# Patient Record
Sex: FEMALE | Race: WHITE | NOT HISPANIC OR LATINO | Employment: OTHER | URBAN - NONMETROPOLITAN AREA
[De-identification: names, ages, dates, MRNs, and addresses within clinical notes are randomized per-mention and may not be internally consistent; named-entity substitution may affect disease eponyms.]

---

## 2018-03-07 NOTE — PROGRESS NOTES
History of Present Illness    Revaccination   Vaccine Information: Vaccine(s) Given (names): Marco Dress [id number]  Unable to reach by phone  Pt called (attempt 1): [phone] [phone] sd  Pt called (attempt 2): [id number] 1226 sd  Pt called (attempt 3): [id number] 1642 sd  Letter Sent (Regular and Certified): [id number] sd  Active Problems    1  Abdominal pain (789 00) (R10 9)   2  Allergy (995 3) (T78 40XA)   3  Dizziness (780 4) (R42)   4  Elevated alkaline phosphatase level (790 5) (R74 8)   5  Esophageal reflux (530 81) (K21 9)   6  Hearing Loss (389 9)   7  Hyperlipidemia (272 4) (E78 5)   8  Hypothyroidism (244 9) (E03 9)   9  Impaired fasting glucose (790 21) (R73 01)   10  Morbid or severe obesity due to excess calories (278 01) (E66 01)   11  Need for pneumococcal vaccine (V03 82) (Z23)   12  Obstructive sleep apnea (327 23) (G47 33)   13  Palpitations (785 1) (R00 2)   14  SVT (supraventricular tachycardia) (427 89) (I47 1)   15  Vitamin D deficiency (268 9) (E55 9)    Immunizations  Influenza --- [place]: pt refuses vaccine   PPSV --- [place]: per pt never   Tdap --- [place]: per pt  2010   Zoster --- Series1: per pt never     Current Meds   1  Aleve 220 MG Oral Tablet; Take 1 tablet 3 times daily as needed   2  Atorvastatin Calcium 20 MG Oral Tablet; Take 1 tablet daily as directed   3  Levothyroxine Sodium 50 MCG Oral Tablet; TAKE 1 TABLET EVERY DAY EXCEPT   MONDAY AND THURSDAY TAKE 2 TABLETS   4  Levothyroxine Sodium 75 MCG Oral Tablet; TAKE 1 TABLET DAILY   5  Omeprazole 20 MG Oral Capsule Delayed Release; TAKE 1 CAPSULE Daily   6  Vitamin D3 2000 UNIT Oral Tablet; 1 PO QD   7  Vitamin D3 75823 UNIT Oral Capsule; 1 PO Q WEEK   8  Zantac 300 MG Oral Tablet    Allergies    1   Penicillins    Signatures   Electronically signed by : HARPREET Bauman Dec 27 2016 12:18PM EST

## 2018-07-31 ENCOUNTER — OFFICE VISIT (OUTPATIENT)
Dept: INTERNAL MEDICINE CLINIC | Facility: CLINIC | Age: 73
End: 2018-07-31
Payer: MEDICARE

## 2018-07-31 ENCOUNTER — TRANSCRIBE ORDERS (OUTPATIENT)
Dept: LAB | Facility: CLINIC | Age: 73
End: 2018-07-31

## 2018-07-31 ENCOUNTER — LAB (OUTPATIENT)
Dept: LAB | Facility: CLINIC | Age: 73
End: 2018-07-31
Payer: MEDICARE

## 2018-07-31 VITALS
BODY MASS INDEX: 40.38 KG/M2 | HEART RATE: 60 BPM | WEIGHT: 205.7 LBS | DIASTOLIC BLOOD PRESSURE: 82 MMHG | HEIGHT: 60 IN | TEMPERATURE: 98.4 F | SYSTOLIC BLOOD PRESSURE: 124 MMHG | OXYGEN SATURATION: 92 %

## 2018-07-31 DIAGNOSIS — R73.01 IMPAIRED FASTING GLUCOSE: ICD-10-CM

## 2018-07-31 DIAGNOSIS — Z13.820 SCREENING FOR OSTEOPOROSIS: ICD-10-CM

## 2018-07-31 DIAGNOSIS — G89.29 CHRONIC PAIN OF LEFT KNEE: ICD-10-CM

## 2018-07-31 DIAGNOSIS — I10 ESSENTIAL HYPERTENSION: ICD-10-CM

## 2018-07-31 DIAGNOSIS — Z12.39 SCREENING FOR BREAST CANCER: ICD-10-CM

## 2018-07-31 DIAGNOSIS — E78.49 OTHER HYPERLIPIDEMIA: ICD-10-CM

## 2018-07-31 DIAGNOSIS — K21.9 GASTROESOPHAGEAL REFLUX DISEASE WITHOUT ESOPHAGITIS: ICD-10-CM

## 2018-07-31 DIAGNOSIS — J30.1 SEASONAL ALLERGIC RHINITIS DUE TO POLLEN: ICD-10-CM

## 2018-07-31 DIAGNOSIS — E03.9 HYPOTHYROIDISM, UNSPECIFIED TYPE: ICD-10-CM

## 2018-07-31 DIAGNOSIS — M25.562 CHRONIC PAIN OF LEFT KNEE: ICD-10-CM

## 2018-07-31 DIAGNOSIS — E55.9 VITAMIN D DEFICIENCY: ICD-10-CM

## 2018-07-31 DIAGNOSIS — E03.9 HYPOTHYROIDISM, UNSPECIFIED TYPE: Primary | ICD-10-CM

## 2018-07-31 DIAGNOSIS — Z12.4 SCREENING FOR CERVICAL CANCER: ICD-10-CM

## 2018-07-31 DIAGNOSIS — Z13.5 SCREENING FOR EYE CONDITION: ICD-10-CM

## 2018-07-31 DIAGNOSIS — I47.1 SVT (SUPRAVENTRICULAR TACHYCARDIA) (HCC): ICD-10-CM

## 2018-07-31 LAB
ALBUMIN SERPL BCP-MCNC: 3.5 G/DL (ref 3.5–5)
ALP SERPL-CCNC: 160 U/L (ref 46–116)
ALT SERPL W P-5'-P-CCNC: 32 U/L (ref 12–78)
ANION GAP SERPL CALCULATED.3IONS-SCNC: 5 MMOL/L (ref 4–13)
AST SERPL W P-5'-P-CCNC: 32 U/L (ref 5–45)
BASOPHILS # BLD AUTO: 0.06 THOUSANDS/ΜL (ref 0–0.1)
BASOPHILS NFR BLD AUTO: 1 % (ref 0–1)
BILIRUB SERPL-MCNC: 0.62 MG/DL (ref 0.2–1)
BUN SERPL-MCNC: 10 MG/DL (ref 5–25)
CALCIUM SERPL-MCNC: 8.7 MG/DL (ref 8.3–10.1)
CHLORIDE SERPL-SCNC: 109 MMOL/L (ref 100–108)
CHOLEST SERPL-MCNC: 157 MG/DL (ref 50–200)
CO2 SERPL-SCNC: 27 MMOL/L (ref 21–32)
CREAT SERPL-MCNC: 1.04 MG/DL (ref 0.6–1.3)
EOSINOPHIL # BLD AUTO: 0.14 THOUSAND/ΜL (ref 0–0.61)
EOSINOPHIL NFR BLD AUTO: 2 % (ref 0–6)
ERYTHROCYTE [DISTWIDTH] IN BLOOD BY AUTOMATED COUNT: 13.4 % (ref 11.6–15.1)
GFR SERPL CREATININE-BSD FRML MDRD: 53 ML/MIN/1.73SQ M
GLUCOSE P FAST SERPL-MCNC: 103 MG/DL (ref 65–99)
HCT VFR BLD AUTO: 40.1 % (ref 34.8–46.1)
HDLC SERPL-MCNC: 59 MG/DL (ref 40–60)
HGB BLD-MCNC: 12.7 G/DL (ref 11.5–15.4)
IMM GRANULOCYTES # BLD AUTO: 0.01 THOUSAND/UL (ref 0–0.2)
IMM GRANULOCYTES NFR BLD AUTO: 0 % (ref 0–2)
LDLC SERPL CALC-MCNC: 76 MG/DL (ref 0–100)
LYMPHOCYTES # BLD AUTO: 2.02 THOUSANDS/ΜL (ref 0.6–4.47)
LYMPHOCYTES NFR BLD AUTO: 30 % (ref 14–44)
MCH RBC QN AUTO: 28.7 PG (ref 26.8–34.3)
MCHC RBC AUTO-ENTMCNC: 31.7 G/DL (ref 31.4–37.4)
MCV RBC AUTO: 91 FL (ref 82–98)
MONOCYTES # BLD AUTO: 0.43 THOUSAND/ΜL (ref 0.17–1.22)
MONOCYTES NFR BLD AUTO: 6 % (ref 4–12)
NEUTROPHILS # BLD AUTO: 4.12 THOUSANDS/ΜL (ref 1.85–7.62)
NEUTS SEG NFR BLD AUTO: 61 % (ref 43–75)
NONHDLC SERPL-MCNC: 98 MG/DL
NRBC BLD AUTO-RTO: 0 /100 WBCS
PLATELET # BLD AUTO: 261 THOUSANDS/UL (ref 149–390)
PMV BLD AUTO: 12 FL (ref 8.9–12.7)
POTASSIUM SERPL-SCNC: 3.5 MMOL/L (ref 3.5–5.3)
PROT SERPL-MCNC: 7.1 G/DL (ref 6.4–8.2)
RBC # BLD AUTO: 4.42 MILLION/UL (ref 3.81–5.12)
SODIUM SERPL-SCNC: 141 MMOL/L (ref 136–145)
TRIGL SERPL-MCNC: 108 MG/DL
TSH SERPL DL<=0.05 MIU/L-ACNC: 1.18 UIU/ML (ref 0.36–3.74)
WBC # BLD AUTO: 6.78 THOUSAND/UL (ref 4.31–10.16)

## 2018-07-31 PROCEDURE — 85025 COMPLETE CBC W/AUTO DIFF WBC: CPT

## 2018-07-31 PROCEDURE — 36415 COLL VENOUS BLD VENIPUNCTURE: CPT

## 2018-07-31 PROCEDURE — 80053 COMPREHEN METABOLIC PANEL: CPT

## 2018-07-31 PROCEDURE — 80061 LIPID PANEL: CPT

## 2018-07-31 PROCEDURE — 82652 VIT D 1 25-DIHYDROXY: CPT

## 2018-07-31 PROCEDURE — 84443 ASSAY THYROID STIM HORMONE: CPT

## 2018-07-31 PROCEDURE — 99204 OFFICE O/P NEW MOD 45 MIN: CPT | Performed by: NURSE PRACTITIONER

## 2018-07-31 RX ORDER — ATORVASTATIN CALCIUM 20 MG/1
1 TABLET, FILM COATED ORAL DAILY
COMMUNITY
Start: 2014-07-22 | End: 2018-07-31 | Stop reason: SDUPTHER

## 2018-07-31 RX ORDER — LOSARTAN POTASSIUM 50 MG/1
25 TABLET ORAL DAILY
COMMUNITY
End: 2018-07-31 | Stop reason: SDUPTHER

## 2018-07-31 RX ORDER — FLUTICASONE PROPIONATE 50 MCG
1 SPRAY, SUSPENSION (ML) NASAL DAILY
COMMUNITY
End: 2021-01-15

## 2018-07-31 RX ORDER — OMEPRAZOLE 20 MG/1
20 CAPSULE, DELAYED RELEASE ORAL DAILY
Qty: 90 CAPSULE | Refills: 3 | Status: SHIPPED | OUTPATIENT
Start: 2018-07-31 | End: 2019-10-23 | Stop reason: SDUPTHER

## 2018-07-31 RX ORDER — LEVOTHYROXINE SODIUM 0.07 MG/1
75 TABLET ORAL DAILY
Qty: 90 TABLET | Refills: 3 | Status: SHIPPED | OUTPATIENT
Start: 2018-07-31 | End: 2019-11-12 | Stop reason: SDUPTHER

## 2018-07-31 RX ORDER — CETIRIZINE HYDROCHLORIDE 10 MG/1
10 TABLET ORAL DAILY
Qty: 30 TABLET | Refills: 3 | Status: SHIPPED | OUTPATIENT
Start: 2018-07-31 | End: 2021-01-15

## 2018-07-31 RX ORDER — ATORVASTATIN CALCIUM 20 MG/1
20 TABLET, FILM COATED ORAL DAILY
Qty: 90 TABLET | Refills: 3 | Status: SHIPPED | OUTPATIENT
Start: 2018-07-31 | End: 2019-10-31 | Stop reason: SDUPTHER

## 2018-07-31 RX ORDER — OMEPRAZOLE 20 MG/1
1 CAPSULE, DELAYED RELEASE ORAL DAILY
COMMUNITY
Start: 2017-04-07 | End: 2018-07-31 | Stop reason: SDUPTHER

## 2018-07-31 RX ORDER — LOSARTAN POTASSIUM 50 MG/1
25 TABLET ORAL DAILY
Qty: 90 TABLET | Refills: 3 | Status: SHIPPED | OUTPATIENT
Start: 2018-07-31 | End: 2021-01-11 | Stop reason: SDUPTHER

## 2018-07-31 RX ORDER — LEVOTHYROXINE SODIUM 0.07 MG/1
1 TABLET ORAL DAILY
COMMUNITY
Start: 2015-02-26 | End: 2018-07-31 | Stop reason: SDUPTHER

## 2018-07-31 RX ORDER — ACETAMINOPHEN 160 MG
TABLET,DISINTEGRATING ORAL DAILY
COMMUNITY
Start: 2014-07-22 | End: 2018-08-10 | Stop reason: SDUPTHER

## 2018-07-31 NOTE — PROGRESS NOTES
Assessment/Plan: Will order fasting labs for patient to have done and will give refills on her medications  She was given a script for a mammogram, bone density and GYN  She is deferring a colonoscopy at this time  She was given a referral for Dr Yola Barrientos for an eye exam   BP stable at 124/82  Will order an XR of her left knee due to chronic pain  Will follow up in 6 months or sooner if need be  No problem-specific Assessment & Plan notes found for this encounter           Problem List Items Addressed This Visit     Esophageal reflux    Relevant Medications    omeprazole (PriLOSEC) 20 mg delayed release capsule    Hypothyroidism - Primary    Relevant Medications    levothyroxine 75 mcg tablet    Other Relevant Orders    Comprehensive metabolic panel    CBC and differential    TSH, 3rd generation with T4 reflex    Lipid panel    Hyperlipidemia    Relevant Medications    atorvastatin (LIPITOR) 20 mg tablet    Other Relevant Orders    Comprehensive metabolic panel    CBC and differential    TSH, 3rd generation with T4 reflex    Lipid panel    Impaired fasting glucose    Relevant Orders    Comprehensive metabolic panel    CBC and differential    TSH, 3rd generation with T4 reflex    Lipid panel    SVT (supraventricular tachycardia) (HCC)    Vitamin D deficiency    Relevant Orders    Vitamin D 1,25 dihydroxy    Essential hypertension    Relevant Medications    losartan (COZAAR) 50 mg tablet      Other Visit Diagnoses     Screening for breast cancer        Relevant Orders    Mammo screening bilateral w 3d & cad    Screening for osteoporosis        Relevant Orders    DXA bone density spine hip and pelvis    Screening for cervical cancer        Relevant Orders    Ambulatory referral to Obstetrics / Gynecology    Chronic pain of left knee        Relevant Orders    XR knee 3 vw left non injury    Seasonal allergic rhinitis due to pollen        Relevant Medications    cetirizine (ZyrTEC) 10 mg tablet    Screening for eye condition        Relevant Orders    Ambulatory referral to Ophthalmology            Subjective:      Patient ID: Jessica Quintana is a 67 y o  female  Jessica Leo is here today to establish care as a new patient  She states her last Doctor was in [place] and she recently did move up here  She does have a history of hypothyroidism and is taking Synthroid 75 mcg, HTN, urinary incontinence, impaired fasting glucose, and SVT  She states she did have an ECHO done in 2015 which as normal   She is taking Losartan 50 mg daily  She did have her gallbladder removed  She will need a referral to an eye exam   She will need scripts for a mammogram, bone density, and GYN exam   She is deferring a colonoscopy  She is having issue with her left knee and states she did have a fall several months ago and her knee will swell on and off  She would like an XR for this  She denies any chest pain, SOB, and does have occasional palpitations  She denies any depression or anxiety  She denies any tobacco, alcohol, and drug use  She does not see a dentist and does have upper and lower dentures  She will need refills on her medications  She is having some post nasal drip and symptoms of allergies and would like something called in for her  She offers no other symptoms  The following portions of the patient's history were reviewed and updated as appropriate:   She  has a past medical history of Allergic; Hypertension; and Hypothyroid    She   Patient Active Problem List    Diagnosis Date Noted    Essential hypertension 07/31/2018    Obstructive sleep apnea 05/04/2015    SVT (supraventricular tachycardia) (HCC) 04/02/2015    Elevated alkaline phosphatase level 02/26/2015    Impaired fasting glucose 02/26/2015    Palpitations 02/26/2015    Vitamin D deficiency 05/05/2014    Hearing loss 03/24/2014    Hyperlipidemia 03/24/2014    Esophageal reflux 03/21/2014    Hypothyroidism 03/21/2014    Morbid obesity ([facility] [address] ) 03/21/2014     She  has a past surgical history that includes Dilation and curettage of uterus and Gallbladder surgery  Her family history includes Emphysema in her father and mother  She  reports that she has never smoked  She has never used smokeless tobacco  She reports that she does not drink alcohol or use drugs  Current Outpatient Prescriptions   Medication Sig Dispense Refill    atorvastatin (LIPITOR) 20 mg tablet Take 1 tablet (20 mg total) by mouth daily 90 tablet 3    Cholecalciferol (VITAMIN D3) 2000 units capsule Take by mouth daily      fluticasone (FLONASE) 50 mcg/act nasal spray 1 spray into each nostril daily      levothyroxine 75 mcg tablet Take 1 tablet (75 mcg total) by mouth daily 90 tablet 3    losartan (COZAAR) 50 mg tablet Take 0 5 tablets (25 mg total) by mouth daily 90 tablet 3    omeprazole (PriLOSEC) 20 mg delayed release capsule Take 1 capsule (20 mg total) by mouth daily 90 capsule 3    cetirizine (ZyrTEC) 10 mg tablet Take 1 tablet (10 mg total) by mouth daily 30 tablet 3     No current facility-administered medications for this visit  No current outpatient prescriptions on file prior to visit  No current facility-administered medications on file prior to visit  She is allergic to penicillins and shrimp extract allergy skin test     Review of Systems   Constitutional: Negative  HENT: Negative  Eyes: Negative  Respiratory: Negative  Cardiovascular: Negative  Gastrointestinal: Negative  Endocrine: Negative  Genitourinary: Negative  Musculoskeletal:        Left knee pain   Skin: Negative  Allergic/Immunologic: Negative  Neurological: Negative  Hematological: Negative  Psychiatric/Behavioral: Negative            Objective:      /82 (BP Location: Right arm, Patient Position: Sitting, Cuff Size: Large)   Pulse 60   Temp 98 4 °F (36 9 °C) (Temporal)   Ht 5' (1 524 m)   Wt 93 3 kg (205 lb 11 2 oz)   SpO2 92%   BMI 40 17 kg/m²          Physical Exam   Constitutional: She is oriented to person, place, and time  She appears well-developed and well-nourished  HENT:   Head: Normocephalic and atraumatic  Right Ear: External ear normal    Left Ear: External ear normal    Nose: Nose normal    Mouth/Throat: Oropharynx is clear and moist    Eyes: Conjunctivae and EOM are normal  Pupils are equal, round, and reactive to light  Neck: Normal range of motion  Neck supple  Cardiovascular: Normal rate, regular rhythm, normal heart sounds and intact distal pulses  Pulmonary/Chest: Effort normal and breath sounds normal    Abdominal: Soft  Bowel sounds are normal    Musculoskeletal: Normal range of motion  Neurological: She is alert and oriented to person, place, and time  She has normal reflexes  Skin: Skin is warm and dry  Psychiatric: She has a normal mood and affect  Her behavior is normal  Judgment and thought content normal    Vitals reviewed

## 2018-08-01 LAB — 1,25(OH)2D3 SERPL-MCNC: 48.6 PG/ML (ref 19.9–79.3)

## 2018-08-10 DIAGNOSIS — E55.9 VITAMIN D DEFICIENCY: Primary | ICD-10-CM

## 2018-08-10 NOTE — TELEPHONE ENCOUNTER
Pt called stating she is on Amlodipine 2 5 mg   Stated she brought it in and there is nothing in her chart  She got it refilled from her previous prescription  She was asking should she still take it because she is taking the losartan  Stated she would like an answer today left on her answering machine  Zena Abernathy

## 2018-08-11 RX ORDER — ACETAMINOPHEN 160 MG
2000 TABLET,DISINTEGRATING ORAL DAILY
Qty: 1 CAPSULE | Refills: 3 | Status: SHIPPED | OUTPATIENT
Start: 2018-08-11 | End: 2019-10-03

## 2018-08-11 NOTE — TELEPHONE ENCOUNTER
Left patient a message on her machine as she instructed  Per our verbal conversation and the message left in her chart, I instructed her that she can continue both medications  I also told her to call us back to let us know if she is taking her BP medications at home and what her readings have been  I told her we will be back in the office on Monday

## 2018-08-11 NOTE — TELEPHONE ENCOUNTER
I was already out of this office and did get this message this am   She can continue both medications does she check her BP s at home at all??

## 2018-08-16 ENCOUNTER — HOSPITAL ENCOUNTER (OUTPATIENT)
Dept: RADIOLOGY | Facility: HOSPITAL | Age: 73
Discharge: HOME | End: 2018-08-16
Payer: MEDICARE

## 2018-08-16 DIAGNOSIS — M25.562 CHRONIC PAIN OF LEFT KNEE: ICD-10-CM

## 2018-08-16 DIAGNOSIS — G89.29 CHRONIC PAIN OF LEFT KNEE: ICD-10-CM

## 2018-08-16 PROCEDURE — 73562 X-RAY EXAM OF KNEE 3: CPT

## 2018-08-20 NOTE — PROGRESS NOTES
Can you let emmett know her XR is showing degenerative changes in her left knee I would like to send her to Ortho

## 2018-11-27 ENCOUNTER — TELEPHONE (OUTPATIENT)
Dept: INTERNAL MEDICINE CLINIC | Facility: CLINIC | Age: 73
End: 2018-11-27

## 2018-11-27 NOTE — TELEPHONE ENCOUNTER
I called and tried to leave a message for [facility]  We would like to remind her that she is overdue for her mammogram and that there is an order in the system       Her voicemail was full

## 2019-10-01 ENCOUNTER — OFFICE VISIT (OUTPATIENT)
Dept: INTERNAL MEDICINE CLINIC | Facility: CLINIC | Age: 74
End: 2019-10-01
Payer: MEDICARE

## 2019-10-01 VITALS
RESPIRATION RATE: 18 BRPM | DIASTOLIC BLOOD PRESSURE: 78 MMHG | TEMPERATURE: 97.8 F | HEART RATE: 79 BPM | BODY MASS INDEX: 38.1 KG/M2 | OXYGEN SATURATION: 97 % | WEIGHT: 223.2 LBS | SYSTOLIC BLOOD PRESSURE: 116 MMHG | HEIGHT: 64 IN

## 2019-10-01 DIAGNOSIS — E03.9 HYPOTHYROIDISM, UNSPECIFIED TYPE: ICD-10-CM

## 2019-10-01 DIAGNOSIS — Z00.00 MEDICARE ANNUAL WELLNESS VISIT, SUBSEQUENT: Primary | ICD-10-CM

## 2019-10-01 DIAGNOSIS — K21.9 GASTROESOPHAGEAL REFLUX DISEASE WITHOUT ESOPHAGITIS: ICD-10-CM

## 2019-10-01 DIAGNOSIS — I10 ESSENTIAL HYPERTENSION: ICD-10-CM

## 2019-10-01 DIAGNOSIS — E78.2 MIXED HYPERLIPIDEMIA: ICD-10-CM

## 2019-10-01 DIAGNOSIS — M25.50 ARTHRALGIA, UNSPECIFIED JOINT: ICD-10-CM

## 2019-10-01 DIAGNOSIS — Z78.0 POSTMENOPAUSAL: ICD-10-CM

## 2019-10-01 DIAGNOSIS — E55.9 VITAMIN D DEFICIENCY: ICD-10-CM

## 2019-10-01 DIAGNOSIS — M17.12 ARTHRITIS OF LEFT KNEE: ICD-10-CM

## 2019-10-01 DIAGNOSIS — Z12.39 SCREENING FOR BREAST CANCER: ICD-10-CM

## 2019-10-01 DIAGNOSIS — Z12.11 SCREENING FOR COLON CANCER: ICD-10-CM

## 2019-10-01 DIAGNOSIS — Z23 NEED FOR INFLUENZA VACCINATION: ICD-10-CM

## 2019-10-01 DIAGNOSIS — M25.512 ACUTE PAIN OF LEFT SHOULDER: ICD-10-CM

## 2019-10-01 DIAGNOSIS — E66.01 MORBID OBESITY (HCC): ICD-10-CM

## 2019-10-01 DIAGNOSIS — I47.1 SVT (SUPRAVENTRICULAR TACHYCARDIA) (HCC): ICD-10-CM

## 2019-10-01 PROCEDURE — G0008 ADMIN INFLUENZA VIRUS VAC: HCPCS | Performed by: NURSE PRACTITIONER

## 2019-10-01 PROCEDURE — 99214 OFFICE O/P EST MOD 30 MIN: CPT | Performed by: NURSE PRACTITIONER

## 2019-10-01 PROCEDURE — 90662 IIV NO PRSV INCREASED AG IM: CPT | Performed by: NURSE PRACTITIONER

## 2019-10-01 PROCEDURE — G0439 PPPS, SUBSEQ VISIT: HCPCS | Performed by: NURSE PRACTITIONER

## 2019-10-01 RX ORDER — AMLODIPINE BESYLATE 2.5 MG/1
2.5 TABLET ORAL DAILY
COMMUNITY
End: 2020-01-31 | Stop reason: SDUPTHER

## 2019-10-01 RX ORDER — MONTELUKAST SODIUM 10 MG/1
10 TABLET ORAL DAILY
Refills: 11 | COMMUNITY
Start: 2019-07-14 | End: 2021-01-15

## 2019-10-01 NOTE — PATIENT INSTRUCTIONS
Medicare Preventive Visit Patient Instructions  Thank you for completing your Welcome to Medicare Visit or Medicare Annual Wellness Visit today  Your next wellness visit will be due in one year (10/1/2020)  The screening/preventive services that you may require over the next 5-10 years are detailed below  Some tests may not apply to you based off risk factors and/or age  Screening tests ordered at today's visit but not completed yet may show as past due  Also, please note that scanned in results may not display below  Preventive Screenings:  Service Recommendations Previous Testing/Comments   Colorectal Cancer Screening  * Colonoscopy    * Fecal Occult Blood Test (FOBT)/Fecal Immunochemical Test (FIT)  * Fecal DNA/Cologuard Test  * Flexible Sigmoidoscopy Age: 54-65 years old   Colonoscopy: every 10 years (may be performed more frequently if at higher risk)  OR  FOBT/FIT: every 1 year  OR  Cologuard: every 3 years  OR  Sigmoidoscopy: every 5 years  Screening may be recommended earlier than age 48 if at higher risk for colorectal cancer  Also, an individualized decision between you and your healthcare provider will decide whether screening between the ages of 74-80 would be appropriate  Colonoscopy: Not on file  FOBT/FIT: Not on file  Cologuard: Not on file  Sigmoidoscopy: Not on file         Breast Cancer Screening Age: 36 years old  Frequency: every 1-2 years  Not required if history of left and right mastectomy Mammogram: Not on file       Cervical Cancer Screening Between the ages of 21-29, pap smear recommended once every 3 years  Between the ages of 33-67, can perform pap smear with HPV co-testing every 5 years     Recommendations may differ for women with a history of total hysterectomy, cervical cancer, or abnormal pap smears in past  Pap Smear: Not on file    Screening Not Indicated   Hepatitis C Screening Once for adults born between [facility]  More frequently in patients at high risk for Hepatitis C Hep C Antibody: Not on file       Diabetes Screening 1-2 times per year if you're at risk for diabetes or have pre-diabetes Fasting glucose: 103 mg/dL   A1C: 5 9 %       Cholesterol Screening Once every 5 years if you don't have a lipid disorder  May order more often based on risk factors  Lipid panel: 07/31/2018    Screening Not Indicated  History Lipid Disorder     Other Preventive Screenings Covered by Medicare:  1  Abdominal Aortic Aneurysm (AAA) Screening: covered once if your at risk  You're considered to be at risk if you have a family history of AAA  2  Lung Cancer Screening: covers low dose CT scan once per year if you meet all of the following conditions: (1) Age 50-69; (2) No signs or symptoms of lung cancer; (3) Current smoker or have quit smoking within the last 15 years; (4) You have a tobacco smoking history of at least 30 pack years (packs per day multiplied by number of years you smoked); (5) You get a written order from a healthcare provider  3  Glaucoma Screening: covered annually if you're considered high risk: (1) You have diabetes OR (2) Family history of glaucoma OR (3)  aged 48 and older OR (3)  American aged 72 and older  3  Osteoporosis Screening: covered every 2 years if you meet one of the following conditions: (1) You're estrogen deficient and at risk for osteoporosis based off medical history and other findings; (2) Have a vertebral abnormality; (3) On glucocorticoid therapy for more than 3 months; (4) Have primary hyperparathyroidism; (5) On osteoporosis medications and need to assess response to drug therapy  · Last bone density test (DXA Scan): Not on file  5  HIV Screening: covered annually if you're between the age of 12-76  Also covered annually if you are younger than 13 and older than 72 with risk factors for HIV infection  For pregnant patients, it is covered up to 3 times per pregnancy      Immunizations:  Immunization Recommendations   Influenza Vaccine Annual influenza vaccination during flu season is recommended for all persons aged >= 6 months who do not have contraindications   Pneumococcal Vaccine (Prevnar and Pneumovax)  * Prevnar = PCV13  * Pneumovax = PPSV23   Adults 25-60 years old: 1-3 doses may be recommended based on certain risk factors  Adults 72 years old: Prevnar (PCV13) vaccine recommended followed by Pneumovax (PPSV23) vaccine  If already received PPSV23 since turning 65, then PCV13 recommended at least one year after PPSV23 dose  Hepatitis B Vaccine 3 dose series if at intermediate or high risk (ex: diabetes, end stage renal disease, liver disease)   Tetanus (Td) Vaccine - COST NOT COVERED BY MEDICARE PART B Following completion of primary series, a booster dose should be given every 10 years to maintain immunity against tetanus  Td may also be given as tetanus wound prophylaxis  Tdap Vaccine - COST NOT COVERED BY MEDICARE PART B Recommended at least once for all adults  For pregnant patients, recommended with each pregnancy  Shingles Vaccine (Shingrix) - COST NOT COVERED BY MEDICARE PART B  2 shot series recommended in those aged 48 and above     Health Maintenance Due:      Topic Date Due    MAMMOGRAM  1945    CRC Screening: Colonoscopy  1945     Immunizations Due:      Topic Date Due    Pneumococcal Vaccine: 65+ Years (2 of 2 - PPSV23) 10/27/2016    INFLUENZA VACCINE  07/01/2019     Advance Directives   What are advance directives? Advance directives are legal documents that state your wishes and plans for medical care  These plans are made ahead of time in case you lose your ability to make decisions for yourself  Advance directives can apply to any medical decision, such as the treatments you want, and if you want to donate organs  What are the types of advance directives? There are many types of advance directives, and each state has rules about how to use them   You may choose a combination of any of the following:  · Living will: This is a written record of the treatment you want  You can also choose which treatments you do not want, which to limit, and which to stop at a certain time  This includes surgery, medicine, IV fluid, and tube feedings  · Durable power of  for healthcare [facility]): This is a written record that states who you want to make healthcare choices for you when you are unable to make them for yourself  This person, called a proxy, is usually a family member or a friend  You may choose more than 1 proxy  · Do not resuscitate (DNR) order:  A DNR order is used in case your heart stops beating or you stop breathing  It is a request not to have certain forms of treatment, such as CPR  A DNR order may be included in other types of advance directives  · Medical directive: This covers the care that you want if you are in a coma, near death, or unable to make decisions for yourself  You can list the treatments you want for each condition  Treatment may include pain medicine, surgery, blood transfusions, dialysis, IV or tube feedings, and a ventilator (breathing machine)  · Values history: This document has questions about your views, beliefs, and how you feel and think about life  This information can help others choose the care that you would choose  Why are advance directives important? An advance directive helps you control your care  Although spoken wishes may be used, it is better to have your wishes written down  Spoken wishes can be misunderstood, or not followed  Treatments may be given even if you do not want them  An advance directive may make it easier for your family to make difficult choices about your care  Fall Prevention    Fall prevention  includes ways to make your home and other areas safer  It also includes ways you can move more carefully to prevent a fall   Health conditions that cause changes in your blood pressure, vision, or muscle strength and coordination may increase your risk for falls  Medicines may also increase your risk for falls if they make you dizzy, weak, or sleepy  Fall prevention tips:   · Stand or sit up slowly  · Use assistive devices as directed  · Wear shoes that fit well and have soles that   · Wear a personal alarm  · Stay active  · Manage your medical conditions  Home Safety Tips:  · Add items to prevent falls in the bathroom  · Keep paths clear  · Install bright lights in your home  · Keep items you use often on shelves within reach  · Paint or place reflective tape on the edges of your stairs  Urinary Incontinence   Urinary incontinence (UI)  is when you lose control of your bladder  UI develops because your bladder cannot store or empty urine properly  The 3 most common types of UI are stress incontinence, urge incontinence, or both  Medicines:   · May be given to help strengthen your bladder control  Report any side effects of medication to your healthcare provider  Do pelvic muscle exercises often:  Your pelvic muscles help you stop urinating  Squeeze these muscles tight for 5 seconds, then relax for 5 seconds  Gradually work up to squeezing for 10 seconds  Do 3 sets of 15 repetitions a day, or as directed  This will help strengthen your pelvic muscles and improve bladder control  Train your bladder:  Go to the bathroom at set times, such as every 2 hours, even if you do not feel the urge to go  You can also try to hold your urine when you feel the urge to go  For example, hold your urine for 5 minutes when you feel the urge to go  As that becomes easier, hold your urine for 10 minutes  Self-care:   · Keep a UI record  Write down how often you leak urine and how much you leak  Make a note of what you were doing when you leaked urine  · Drink liquids as directed  You may need to limit the amount of liquid you drink to help control your urine leakage  Do not drink any liquid right before you go to bed  Limit or do not have drinks that contain caffeine or alcohol  · Prevent constipation  Eat a variety of high-fiber foods  Good examples are high-fiber cereals, beans, vegetables, and whole-grain breads  Walking is the best way to trigger your intestines to have a bowel movement  · Exercise regularly and maintain a healthy weight  Weight loss and exercise will decrease pressure on your bladder and help you control your leakage  · Use a catheter as directed  to help empty your bladder  A catheter is a tiny, plastic tube that is put into your bladder to drain your urine  · Go to behavior therapy as directed  Behavior therapy may be used to help you learn to control your urge to urinate  Weight Management   Why it is important to manage your weight:  Being overweight increases your risk of health conditions such as heart disease, high blood pressure, type 2 diabetes, and certain types of cancer  It can also increase your risk for osteoarthritis, sleep apnea, and other respiratory problems  Aim for a slow, steady weight loss  Even a small amount of weight loss can lower your risk of health problems  How to lose weight safely:  A safe and healthy way to lose weight is to eat fewer calories and get regular exercise  You can lose up about 1 pound a week by decreasing the number of calories you eat by 500 calories each day  Healthy meal plan for weight management:  A healthy meal plan includes a variety of foods, contains fewer calories, and helps you stay healthy  A healthy meal plan includes the following:  · Eat whole-grain foods more often  A healthy meal plan should contain fiber  Fiber is the part of grains, fruits, and vegetables that is not broken down by your body  Whole-grain foods are healthy and provide extra fiber in your diet  Some examples of whole-grain foods are whole-wheat breads and pastas, oatmeal, brown rice, and bulgur  · Eat a variety of vegetables every day    Include dark, leafy greens such as spinach, kale, sebastián greens, and mustard greens  Eat yellow and orange vegetables such as carrots, sweet potatoes, and winter squash  · Eat a variety of fruits every day  Choose fresh or canned fruit (canned in its own juice or light syrup) instead of juice  Fruit juice has very little or no fiber  · Eat low-fat dairy foods  Drink fat-free (skim) milk or 1% milk  Eat fat-free yogurt and low-fat cottage cheese  Try low-fat cheeses such as mozzarella and other reduced-fat cheeses  · Choose meat and other protein foods that are low in fat  Choose beans or other legumes such as split peas or lentils  Choose fish, skinless poultry (chicken or turkey), or lean cuts of red meat (beef or pork)  Before you cook meat or poultry, cut off any visible fat  · Use less fat and oil  Try baking foods instead of frying them  Add less fat, such as margarine, sour cream, regular salad dressing and mayonnaise to foods  Eat fewer high-fat foods  Some examples of high-fat foods include french fries, doughnuts, ice cream, and cakes  · Eat fewer sweets  Limit foods and drinks that are high in sugar  This includes candy, cookies, regular soda, and sweetened drinks  Exercise:  Exercise at least 30 minutes per day on most days of the week  Some examples of exercise include walking, biking, dancing, and swimming  You can also fit in more physical activity by taking the stairs instead of the elevator or parking farther away from stores  Ask your healthcare provider about the best exercise plan for you  © Copyright [employer] 2018 Information is for End User's use only and may not be sold, redistributed or otherwise used for commercial purposes  All illustrations and images included in CareNotes® are the copyrighted property of A [employer] A M , Inc  or [facility] Winifred Adhikari    Low Fat Diet   AMBULATORY CARE:   A low-fat diet  is an eating plan that is low in total fat, unhealthy fat, and cholesterol   You may need to follow a low-fat diet if you have trouble digesting or absorbing fat  You may also need to follow this diet if you have high cholesterol  You can also lower your cholesterol by increasing the amount of fiber in your diet  Soluble fiber is a type of fiber that helps to decrease cholesterol levels  Different types of fat in food:   · Limit unhealthy fats  A diet that is high in cholesterol, saturated fat, and trans fat may cause unhealthy cholesterol levels  Unhealthy cholesterol levels increase your risk of heart disease  ¨ Cholesterol:  Limit intake of cholesterol to less than 200 mg per day  Cholesterol is found in meat, eggs, and dairy  ¨ Saturated fat:  Limit saturated fat to less than 7% of your total daily calories  Ask your dietitian how many calories you need each day  Saturated fat is found in butter, cheese, ice cream, whole milk, and palm oil  Saturated fat is also found in meat, such as beef, pork, chicken skin, and processed meats  Processed meats include sausage, hot dogs, and bologna  ¨ Trans fat:  Avoid trans fat as much as possible  Trans fat is used in fried and baked foods  Foods that say trans fat free on the label may still have up to 0 5 grams of trans fat per serving  · Include healthy fats  Replace foods that are high in saturated and trans fat with foods high in healthy fats  This may help to decrease high cholesterol levels  ¨ Monounsaturated fats: These are found in avocados, nuts, and vegetable oils, such as olive, canola, and sunflower oil  ¨ Polyunsaturated fats: These can be found in vegetable oils, such as soybean or corn oil  Omega-3 fats can help to decrease the risk of heart disease  Omega-3 fats are found in fish, such as salmon, herring, trout, and tuna  Omega-3 fats can also be found in plant foods, such as walnuts, flaxseed, soybeans, and canola oil    Foods to limit or avoid:   · Grains:      ¨ Snacks that are made with partially hydrogenated oils, such as chips, regular crackers, and butter-flavored popcorn    ¨ High-fat baked goods, such as biscuits, croissants, doughnuts, pies, cookies, and pastries    · Dairy:      ¨ Whole milk, 2% milk, and yogurt and ice cream made with whole milk    ¨ Half and half creamer, heavy cream, and whipping cream    ¨ Cheese, cream cheese, and sour cream    · Meats and proteins:      ¨ High-fat cuts of meat (T-bone steak, regular hamburger, and ribs)    ¨ Fried meat, poultry (turkey and chicken), and fish    ¨ Poultry (chicken and turkey) with skin    ¨ Cold cuts (salami or bologna), hot dogs, cedillo, and sausage    ¨ Whole eggs and egg yolks    · Vegetables and fruits with added fat:      ¨ Fried vegetables or vegetables in butter or high-fat sauces, such as cream or cheese sauces    ¨ Fried fruit or fruit served with butter or cream    · Fats:      ¨ Butter, stick margarine, and shortening    ¨ Coconut, palm oil, and palm kernel oil  Foods to include:   · Grains:      ¨ Whole-grain breads, cereals, pasta, and brown rice    ¨ Low-fat crackers and pretzels    · Vegetables and fruits:      ¨ Fresh, frozen, or canned vegetables (no salt or low-sodium)    ¨ Fresh, frozen, dried, or canned fruit (canned in light syrup or fruit juice)    ¨ Avocado    · Low-fat dairy products:      ¨ Nonfat (skim) or 1% milk    ¨ Nonfat or low-fat cheese, yogurt, and cottage cheese    · Meats and proteins:      ¨ Chicken or turkey with no skin    ¨ Baked or broiled fish    ¨ Lean beef and pork (loin, round, extra lean hamburger)    ¨ Beans and peas, unsalted nuts, soy products    ¨ Egg whites and substitutes    ¨ Seeds and nuts    · Fats:      ¨ Unsaturated oil, such as canola, olive, peanut, soybean, or sunflower oil    ¨ Soft or liquid margarine and vegetable oil spread    ¨ Low-fat salad dressing  Other ways to decrease fat:   · Read food labels before you buy foods  Choose foods that have less than 30% of calories from fat   Choose low-fat or fat-free dairy products  Remember that fat free does not mean calorie free  These foods still contain calories, and too many calories can lead to weight gain  · Trim fat from meat and avoid fried food  Trim all visible fat from meat before you cook it  Remove the skin from poultry  Do not muller meat, fish, or poultry  Bake, roast, boil, or broil these foods instead  Avoid fried foods  Eat a baked potato instead of Western Melisa fries  Steam vegetables instead of sautéing them in butter  · Add less fat to foods  Use imitation cedillo bits on salads and baked potatoes instead of regular cedillo bits  Use fat-free or low-fat salad dressings instead of regular dressings  Use low-fat or nonfat butter-flavored topping instead of regular butter or margarine on popcorn and other foods  Ways to decrease fat in recipes:  Replace high-fat ingredients with low-fat or nonfat ones  This may cause baked goods to be drier than usual  You may need to use nonfat cooking spray on pans to prevent food from sticking  You also may need to change the amount of other ingredients, such as water, in the recipe  Try the following:  · Use low-fat or light margarine instead of regular margarine or shortening  · Use lean ground turkey breast or chicken, or lean ground beef (less than 5% fat) instead of hamburger  · Add 1 teaspoon of canola oil to 8 ounces of skim milk instead of using cream or half and half  · Use grated zucchini, carrots, or apples in breads instead of coconut  · Use blenderized, low-fat cottage cheese, plain tofu, or low-fat ricotta cheese instead of cream cheese  · Use 1 egg white and 1 teaspoon of canola oil, or use ¼ cup (2 ounces) of fat-free egg substitute instead of a whole egg  · Replace half of the oil that is called for in a recipe with applesauce when you bake  Use 3 tablespoons of cocoa powder and 1 tablespoon of canola oil instead of a square of baking chocolate    How to increase fiber:  Eat enough high-fiber foods to get 20 to 30 grams of fiber every day  Slowly increase your fiber intake to avoid stomach cramps, gas, and other problems  · Eat 3 ounces of whole-grain foods each day  An ounce is about 1 slice of bread  Eat whole-grain breads, such as whole-wheat bread  Whole wheat, whole-wheat flour, or other whole grains should be listed as the first ingredient on the food label  Replace white flour with whole-grain flour or use half of each in recipes  Whole-grain flour is heavier than white flour, so you may have to add more yeast or baking powder  · Eat a high-fiber cereal for breakfast   Oatmeal is a good source of soluble fiber  Look for cereals that have bran or fiber in the name  Choose whole-grain products, such as brown rice, barley, and whole-wheat pasta  · Eat more beans, peas, and lentils  For example, add beans to soups or salads  Eat at least 5 cups of fruits and vegetables each day  Eat fruits and vegetables with the peel because the peel is high in fiber  © 2017 [facility] Information is for End User's use only and may not be sold, redistributed or otherwise used for commercial purposes  All illustrations and images included in CareNotes® are the copyrighted property of [employer]  or Fernando Rowley  The above information is an  only  It is not intended as medical advice for individual conditions or treatments  Talk to your doctor, nurse or pharmacist before following any medical regimen to see if it is safe and effective for you  Heart Healthy Diet   AMBULATORY CARE:   A heart healthy diet  is an eating plan low in total fat, unhealthy fats, and sodium (salt)  A heart healthy diet helps decrease your risk for heart disease and stroke  Limit the amount of fat you eat to 25% to 35% of your total daily calories  Limit sodium to less than 2,300 mg each day  Healthy fats:  Healthy fats can help improve cholesterol levels   The risk for heart disease is decreased when cholesterol levels are normal  Choose healthy fats, such as the following:  · Unsaturated fat  is found in foods such as soybean, canola, olive, corn, and safflower oils  It is also found in soft tub margarine that is made with liquid vegetable oil  · Omega-3 fat  is found in certain fish, such as salmon, tuna, and trout, and in walnuts and flaxseed  Unhealthy fats:  Unhealthy fats can cause unhealthy cholesterol levels in your blood and increase your risk of heart disease  Limit unhealthy fats, such as the following:  · Cholesterol  is found in animal foods, such as eggs and lobster, and in dairy products made from whole milk  Limit cholesterol to less than 300 milligrams (mg) each day  You may need to limit cholesterol to 200 mg each day if you have heart disease  · Saturated fat  is found in meats, such as cedillo and hamburger  It is also found in chicken or turkey skin, whole milk, and butter  Limit saturated fat to less than 7% of your total daily calories  Limit saturated fat to less than 6% if you have heart disease or are at increased risk for it  · Trans fat  is found in packaged foods, such as potato chips and cookies  It is also in hard margarine, some fried foods, and shortening  Avoid trans fats as much as possible    Heart healthy foods and drinks to include:  Ask your dietitian or healthcare provider how many servings to have from each of the following food groups:  · Grains:      ¨ Whole-wheat breads, cereals, and pastas, and brown rice    ¨ Low-fat, low-sodium crackers and chips    · Vegetables:      ¨ Broccoli, green beans, green peas, and spinach    ¨ Collards, kale, and lima beans    ¨ Carrots, sweet potatoes, tomatoes, and peppers    ¨ Canned vegetables with no salt added    · Fruits:      ¨ Bananas, peaches, pears, and pineapple    ¨ Grapes, raisins, and dates    ¨ Oranges, tangerines, grapefruit, orange juice, and grapefruit juice    ¨ Apricots, mangoes, melons, and papaya    ¨ Raspberries and strawberries    ¨ Canned fruit with no added sugar    · Low-fat dairy products:      ¨ Nonfat (skim) milk, 1% milk, and low-fat almond, cashew, or soy milks fortified with calcium    ¨ Low-fat cheese, regular or frozen yogurt, and cottage cheese    · Meats and proteins , such as lean cuts of beef and pork (loin, leg, round), skinless chicken and turkey, legumes, soy products, egg whites, and nuts  Foods and drinks to limit or avoid:  Ask your dietitian or healthcare provider about these and other foods that are high in unhealthy fat, sodium, and sugar:  · Snack or packaged foods , such as frozen dinners, cookies, macaroni and cheese, and cereals with more than 300 mg of sodium per serving    · Canned or dry mixes  for cakes, soups, sauces, or gravies    · Vegetables with added sodium , such as instant potatoes, vegetables with added sauces, or regular canned vegetables    · Other foods high in sodium , such as ketchup, barbecue sauce, salad dressing, pickles, olives, soy sauce, and miso    · High-fat dairy foods  such as whole or 2% milk, cream cheese, or sour cream, and cheeses     · High-fat protein foods  such as high-fat cuts of beef (T-bone steaks, ribs), chicken or turkey with skin, and organ meats, such as liver    · Cured or smoked meats , such as hot dogs, cedillo, and sausage    · Unhealthy fats and oils , such as butter, stick margarine, shortening, and cooking oils such as coconut or palm oil    · Food and drinks high in sugar , such as soft drinks (soda), sports drinks, sweetened tea, candy, cake, cookies, pies, and doughnuts  Other diet guidelines to follow:   · Eat more foods containing omega-3 fats  Eat fish high in omega-3 fats at least 2 times a week  · Limit alcohol  Too much alcohol can damage your heart and raise your blood pressure  Women should limit alcohol to 1 drink a day  Men should limit alcohol to 2 drinks a day   A drink of alcohol is 12 ounces of beer, 5 ounces of wine, or 1½ ounces of liquor  · Choose low-sodium foods  High-sodium foods can lead to high blood pressure  Add little or no salt to food you prepare  Use herbs and spices in place of salt  · Eat more fiber  to help lower cholesterol levels  Eat at least 5 servings of fruits and vegetables each day  Eat 3 ounces of whole-grain foods each day  Legumes (beans) are also a good source of fiber  Lifestyle guidelines:   · Do not smoke  Nicotine and other chemicals in cigarettes and cigars can cause lung and heart damage  Ask your healthcare provider for information if you currently smoke and need help to quit  E-cigarettes or smokeless tobacco still contain nicotine  Talk to your healthcare provider before you use these products  · Exercise regularly  to help you maintain a healthy weight and improve your blood pressure and cholesterol levels  Ask your healthcare provider about the best exercise plan for you  Do not start an exercise program without asking your healthcare provider  Follow up with your healthcare provider as directed:  Write down your questions so you remember to ask them during your visits  © 2017 [facility] Information is for End User's use only and may not be sold, redistributed or otherwise used for commercial purposes  All illustrations and images included in CareNotes® are the copyrighted property of [employer] D [employer] M [employer]  or Fernando Rowley  The above information is an  only  It is not intended as medical advice for individual conditions or treatments  Talk to your doctor, nurse or pharmacist before following any medical regimen to see if it is safe and effective for you  Medicare Preventive Visit Patient Instructions  Thank you for completing your Welcome to Medicare Visit or Medicare Annual Wellness Visit today  Your next wellness visit will be due in one year (10/1/2020)    The screening/preventive services that you may require over the next 5-10 years are detailed below  Some tests may not apply to you based off risk factors and/or age  Screening tests ordered at today's visit but not completed yet may show as past due  Also, please note that scanned in results may not display below  Preventive Screenings:  Service Recommendations Previous Testing/Comments   Colorectal Cancer Screening  * Colonoscopy    * Fecal Occult Blood Test (FOBT)/Fecal Immunochemical Test (FIT)  * Fecal DNA/Cologuard Test  * Flexible Sigmoidoscopy Age: 54-65 years old   Colonoscopy: every 10 years (may be performed more frequently if at higher risk)  OR  FOBT/FIT: every 1 year  OR  Cologuard: every 3 years  OR  Sigmoidoscopy: every 5 years  Screening may be recommended earlier than age 48 if at higher risk for colorectal cancer  Also, an individualized decision between you and your healthcare provider will decide whether screening between the ages of 74-80 would be appropriate  Colonoscopy: Not on file  FOBT/FIT: Not on file  Cologuard: Not on file  Sigmoidoscopy: Not on file         Breast Cancer Screening Age: 36 years old  Frequency: every 1-2 years  Not required if history of left and right mastectomy Mammogram: Not on file       Cervical Cancer Screening Between the ages of 21-29, pap smear recommended once every 3 years  Between the ages of 33-67, can perform pap smear with HPV co-testing every 5 years  Recommendations may differ for women with a history of total hysterectomy, cervical cancer, or abnormal pap smears in past  Pap Smear: Not on file    Screening Not Indicated   Hepatitis C Screening Once for adults born between 1945 and 1965  More frequently in patients at high risk for Hepatitis C Hep C Antibody: Not on file       Diabetes Screening 1-2 times per year if you're at risk for diabetes or have pre-diabetes Fasting glucose: 103 mg/dL   A1C: 5 9 %       Cholesterol Screening Once every 5 years if you don't have a lipid disorder  May order more often based on risk factors  Lipid panel: 07/31/2018    Screening Not Indicated  History Lipid Disorder     Other Preventive Screenings Covered by Medicare:  6  Abdominal Aortic Aneurysm (AAA) Screening: covered once if your at risk  You're considered to be at risk if you have a family history of AAA  7  Lung Cancer Screening: covers low dose CT scan once per year if you meet all of the following conditions: (1) Age 50-69; (2) No signs or symptoms of lung cancer; (3) Current smoker or have quit smoking within the last 15 years; (4) You have a tobacco smoking history of at least 30 pack years (packs per day multiplied by number of years you smoked); (5) You get a written order from a healthcare provider  8  Glaucoma Screening: covered annually if you're considered high risk: (1) You have diabetes OR (2) Family history of glaucoma OR (3)  aged 48 and older OR (3)  American aged 72 and older  5  Osteoporosis Screening: covered every 2 years if you meet one of the following conditions: (1) You're estrogen deficient and at risk for osteoporosis based off medical history and other findings; (2) Have a vertebral abnormality; (3) On glucocorticoid therapy for more than 3 months; (4) Have primary hyperparathyroidism; (5) On osteoporosis medications and need to assess response to drug therapy  · Last bone density test (DXA Scan): Not on file  10  HIV Screening: covered annually if you're between the age of 12-76  Also covered annually if you are younger than 13 and older than 72 with risk factors for HIV infection  For pregnant patients, it is covered up to 3 times per pregnancy      Immunizations:  Immunization Recommendations   Influenza Vaccine Annual influenza vaccination during flu season is recommended for all persons aged >= 6 months who do not have contraindications   Pneumococcal Vaccine (Prevnar and Pneumovax)  * Prevnar = PCV13  * Pneumovax = PPSV23   Adults 19-64 years old: 1-3 doses may be recommended based on certain risk factors  Adults 72 years old: Prevnar (PCV13) vaccine recommended followed by Pneumovax (PPSV23) vaccine  If already received PPSV23 since turning 65, then PCV13 recommended at least one year after PPSV23 dose  Hepatitis B Vaccine 3 dose series if at intermediate or high risk (ex: diabetes, end stage renal disease, liver disease)   Tetanus (Td) Vaccine - COST NOT COVERED BY MEDICARE PART B Following completion of primary series, a booster dose should be given every 10 years to maintain immunity against tetanus  Td may also be given as tetanus wound prophylaxis  Tdap Vaccine - COST NOT COVERED BY MEDICARE PART B Recommended at least once for all adults  For pregnant patients, recommended with each pregnancy  Shingles Vaccine (Shingrix) - COST NOT COVERED BY MEDICARE PART B  2 shot series recommended in those aged 48 and above     Health Maintenance Due:      Topic Date Due    MAMMOGRAM  1945    CRC Screening: Colonoscopy  1945     Immunizations Due:      Topic Date Due    Pneumococcal Vaccine: 65+ Years (2 of 2 - PPSV23) 10/03/2018    INFLUENZA VACCINE  07/01/2019     Advance Directives   What are advance directives? Advance directives are legal documents that state your wishes and plans for medical care  These plans are made ahead of time in case you lose your ability to make decisions for yourself  Advance directives can apply to any medical decision, such as the treatments you want, and if you want to donate organs  What are the types of advance directives? There are many types of advance directives, and each state has rules about how to use them  You may choose a combination of any of the following:  · Living will: This is a written record of the treatment you want  You can also choose which treatments you do not want, which to limit, and which to stop at a certain time   This includes surgery, medicine, IV fluid, and tube feedings  · Durable power of  for healthcare [facility]): This is a written record that states who you want to make healthcare choices for you when you are unable to make them for yourself  This person, called a proxy, is usually a family member or a friend  You may choose more than 1 proxy  · Do not resuscitate (DNR) order:  A DNR order is used in case your heart stops beating or you stop breathing  It is a request not to have certain forms of treatment, such as CPR  A DNR order may be included in other types of advance directives  · Medical directive: This covers the care that you want if you are in a coma, near death, or unable to make decisions for yourself  You can list the treatments you want for each condition  Treatment may include pain medicine, surgery, blood transfusions, dialysis, IV or tube feedings, and a ventilator (breathing machine)  · Values history: This document has questions about your views, beliefs, and how you feel and think about life  This information can help others choose the care that you would choose  Why are advance directives important? An advance directive helps you control your care  Although spoken wishes may be used, it is better to have your wishes written down  Spoken wishes can be misunderstood, or not followed  Treatments may be given even if you do not want them  An advance directive may make it easier for your family to make difficult choices about your care  Fall Prevention    Fall prevention  includes ways to make your home and other areas safer  It also includes ways you can move more carefully to prevent a fall  Health conditions that cause changes in your blood pressure, vision, or muscle strength and coordination may increase your risk for falls  Medicines may also increase your risk for falls if they make you dizzy, weak, or sleepy  Fall prevention tips:   · Stand or sit up slowly  · Use assistive devices as directed      · Wear shoes that fit well and have soles that   · Wear a personal alarm  · Stay active  · Manage your medical conditions  Home Safety Tips:  · Add items to prevent falls in the bathroom  · Keep paths clear  · Install bright lights in your home  · Keep items you use often on shelves within reach  · Paint or place reflective tape on the edges of your stairs  Urinary Incontinence   Urinary incontinence (UI)  is when you lose control of your bladder  UI develops because your bladder cannot store or empty urine properly  The 3 most common types of UI are stress incontinence, urge incontinence, or both  Medicines:   · May be given to help strengthen your bladder control  Report any side effects of medication to your healthcare provider  Do pelvic muscle exercises often:  Your pelvic muscles help you stop urinating  Squeeze these muscles tight for 5 seconds, then relax for 5 seconds  Gradually work up to squeezing for 10 seconds  Do 3 sets of 15 repetitions a day, or as directed  This will help strengthen your pelvic muscles and improve bladder control  Train your bladder:  Go to the bathroom at set times, such as every 2 hours, even if you do not feel the urge to go  You can also try to hold your urine when you feel the urge to go  For example, hold your urine for 5 minutes when you feel the urge to go  As that becomes easier, hold your urine for 10 minutes  Self-care:   · Keep a UI record  Write down how often you leak urine and how much you leak  Make a note of what you were doing when you leaked urine  · Drink liquids as directed  You may need to limit the amount of liquid you drink to help control your urine leakage  Do not drink any liquid right before you go to bed  Limit or do not have drinks that contain caffeine or alcohol  · Prevent constipation  Eat a variety of high-fiber foods  Good examples are high-fiber cereals, beans, vegetables, and whole-grain breads   Walking is the best way to trigger your intestines to have a bowel movement  · Exercise regularly and maintain a healthy weight  Weight loss and exercise will decrease pressure on your bladder and help you control your leakage  · Use a catheter as directed  to help empty your bladder  A catheter is a tiny, plastic tube that is put into your bladder to drain your urine  · Go to behavior therapy as directed  Behavior therapy may be used to help you learn to control your urge to urinate  Weight Management   Why it is important to manage your weight:  Being overweight increases your risk of health conditions such as heart disease, high blood pressure, type 2 diabetes, and certain types of cancer  It can also increase your risk for osteoarthritis, sleep apnea, and other respiratory problems  Aim for a slow, steady weight loss  Even a small amount of weight loss can lower your risk of health problems  How to lose weight safely:  A safe and healthy way to lose weight is to eat fewer calories and get regular exercise  You can lose up about 1 pound a week by decreasing the number of calories you eat by 500 calories each day  Healthy meal plan for weight management:  A healthy meal plan includes a variety of foods, contains fewer calories, and helps you stay healthy  A healthy meal plan includes the following:  · Eat whole-grain foods more often  A healthy meal plan should contain fiber  Fiber is the part of grains, fruits, and vegetables that is not broken down by your body  Whole-grain foods are healthy and provide extra fiber in your diet  Some examples of whole-grain foods are whole-wheat breads and pastas, oatmeal, brown rice, and bulgur  · Eat a variety of vegetables every day  Include dark, leafy greens such as spinach, kale, sebastián greens, and mustard greens  Eat yellow and orange vegetables such as carrots, sweet potatoes, and winter squash  · Eat a variety of fruits every day    Choose fresh or canned fruit (canned in its own juice or light syrup) instead of juice  Fruit juice has very little or no fiber  · Eat low-fat dairy foods  Drink fat-free (skim) milk or 1% milk  Eat fat-free yogurt and low-fat cottage cheese  Try low-fat cheeses such as mozzarella and other reduced-fat cheeses  · Choose meat and other protein foods that are low in fat  Choose beans or other legumes such as split peas or lentils  Choose fish, skinless poultry (chicken or turkey), or lean cuts of red meat (beef or pork)  Before you cook meat or poultry, cut off any visible fat  · Use less fat and oil  Try baking foods instead of frying them  Add less fat, such as margarine, sour cream, regular salad dressing and mayonnaise to foods  Eat fewer high-fat foods  Some examples of high-fat foods include french fries, doughnuts, ice cream, and cakes  · Eat fewer sweets  Limit foods and drinks that are high in sugar  This includes candy, cookies, regular soda, and sweetened drinks  Exercise:  Exercise at least 30 minutes per day on most days of the week  Some examples of exercise include walking, biking, dancing, and swimming  You can also fit in more physical activity by taking the stairs instead of the elevator or parking farther away from stores  Ask your healthcare provider about the best exercise plan for you  © Copyright [employer] 2018 Information is for End User's use only and may not be sold, redistributed or otherwise used for commercial purposes   All illustrations and images included in CareNotes® are the copyrighted property of A D A M , Inc  or [address] [place] [address]

## 2019-10-01 NOTE — PROGRESS NOTES
Assessment and Plan:     Problem List Items Addressed This Visit        Digestive    Esophageal reflux    Relevant Orders    Comprehensive metabolic panel    CBC and differential       Endocrine    Hypothyroidism    Relevant Orders    Comprehensive metabolic panel    CBC and differential    TSH, 3rd generation with Free T4 reflex       Cardiovascular and Mediastinum    SVT (supraventricular tachycardia) (HCC)    Relevant Medications    amLODIPine (NORVASC) 2 5 mg tablet    Other Relevant Orders    Comprehensive metabolic panel    CBC and differential    Essential hypertension    Relevant Medications    amLODIPine (NORVASC) 2 5 mg tablet    Other Relevant Orders    Comprehensive metabolic panel    CBC and differential       Musculoskeletal and Integument    Arthritis of left knee    Relevant Medications    diclofenac sodium (VOLTAREN) 50 mg EC tablet    Other Relevant Orders    Ambulatory referral to Orthopedic Surgery    C-reactive protein    Sedimentation rate, automated       Other    Hyperlipidemia    Relevant Orders    Comprehensive metabolic panel    CBC and differential    Lipid panel    Morbid obesity (HCC)    Relevant Orders    Comprehensive metabolic panel    CBC and differential    Vitamin D deficiency    Relevant Orders    Comprehensive metabolic panel    CBC and differential    Vitamin D 25 hydroxy    Screening for colon cancer    Relevant Orders    Cologuard    Comprehensive metabolic panel    CBC and differential    Screening for breast cancer    Relevant Orders    Mammo screening bilateral w 3d & cad    Comprehensive metabolic panel    CBC and differential    BMI 38 0-38 9,adult    Relevant Orders    Comprehensive metabolic panel    CBC and differential    Arthralgia    Relevant Medications    diclofenac sodium (VOLTAREN) 50 mg EC tablet    Other Relevant Orders    C-reactive protein    Sedimentation rate, automated    Acute pain of left shoulder    Relevant Medications    diclofenac sodium (VOLTAREN) 50 mg EC tablet    Other Relevant Orders    MRI shoulder left wo contrast    Postmenopausal    Relevant Orders    DXA bone density spine hip and pelvis    Medicare annual wellness visit, subsequent - Primary    Need for influenza vaccination    Relevant Orders    influenza vaccine, 8324-6686, high-dose, PF 0 5 mL (FLUZONE HIGH-DOSE) (Completed)           Preventive health issues were discussed with patient, and age appropriate screening tests were ordered as noted in patient's After Visit Summary  Personalized health advice and appropriate referrals for health education or preventive services given if needed, as noted in patient's After Visit Summary       History of Present Illness:     Patient presents for Medicare Annual Wellness visit    Patient Care Team:  Helena Grullon as PCP - General (Family Medicine)  Amalia Escalante MD     Problem List:     Patient Active Problem List   Diagnosis    Elevated alkaline phosphatase level    Esophageal reflux    Hearing loss    Hypothyroidism    Hyperlipidemia    Impaired fasting glucose    Morbid obesity ([address] )    Obstructive sleep apnea    Palpitations    SVT (supraventricular tachycardia) ([address] )    Vitamin D deficiency    Essential hypertension    Screening for colon cancer    Screening for breast cancer    BMI 38 0-38 9,adult    Arthritis of left knee    Arthralgia    Acute pain of left shoulder    Postmenopausal    Medicare annual wellness visit, subsequent    Need for influenza vaccination      Past Medical and Surgical History:     Past Medical History:   Diagnosis Date    Allergic     Hypertension     last assessed 5/4/15     Hypothyroid      Past Surgical History:   Procedure Laterality Date    DILATION AND CURETTAGE OF UTERUS      GALLBLADDER SURGERY        Family History:     Family History   Problem Relation Age of Onset    Emphysema Mother     Emphysema Father       Social History:     Social History Socioeconomic History    Marital status: Single     Spouse name: None    Number of children: None    Years of education: None    Highest education level: None   Occupational History    Occupation: retired     Occupation:     Social Needs    Financial resource strain: None    Food insecurity:     Worry: None     Inability: None    Transportation needs:     Medical: None     Non-medical: None   Tobacco Use    Smoking status: Never Smoker    Smokeless tobacco: Never Used   Substance and Sexual Activity    Alcohol use: No    Drug use: No    Sexual activity: None   Lifestyle    Physical activity:     Days per week: None     Minutes per session: None    Stress: None   Relationships    Social connections:     Talks on phone: None     Gets together: None     Attends Jainism service: None     Active member of club or organization: None     Attends meetings of clubs or organizations: None     Relationship status: None    Intimate partner violence:     Fear of current or ex partner: None     Emotionally abused: None     Physically abused: None     Forced sexual activity: None   Other Topics Concern    None   Social History Narrative    Lives with friend / boyfriend        Medications and Allergies:     Current Outpatient Medications   Medication Sig Dispense Refill    amLODIPine (NORVASC) 2 5 mg tablet Take 2 5 mg by mouth daily      atorvastatin (LIPITOR) 20 mg tablet Take 1 tablet (20 mg total) by mouth daily 90 tablet 3    Cholecalciferol (VITAMIN D3) 2000 units capsule Take 1 capsule (2,000 Units total) by mouth daily 1 capsule 3    fluticasone (FLONASE) 50 mcg/act nasal spray 1 spray into each nostril daily      levothyroxine 75 mcg tablet Take 1 tablet (75 mcg total) by mouth daily 90 tablet 3    montelukast (SINGULAIR) 10 mg tablet Take 10 mg by mouth daily  11    omeprazole (PriLOSEC) 20 mg delayed release capsule Take 1 capsule (20 mg total) by mouth daily 90 capsule 3    cetirizine (ZyrTEC) 10 mg tablet Take 1 tablet (10 mg total) by mouth daily (Patient not taking: Reported on 10/1/2019) 30 tablet 3    diclofenac sodium (VOLTAREN) 50 mg EC tablet Take one tablet by mouth every 12 hours as needed 60 tablet 0    losartan (COZAAR) 50 mg tablet Take 0 5 tablets (25 mg total) by mouth daily (Patient not taking: Reported on 10/1/2019) 90 tablet 3     No current facility-administered medications for this visit  Allergies   Allergen Reactions    Honey Tussin [Dextromethorphan]     Penicillins     Shrimp Extract Allergy Skin Test       Immunizations:     Immunization History   Administered Date(s) Administered    INFLUENZA 09/14/2017    Influenza TIV (IM) 1945    Influenza, high dose seasonal 0 5 mL 10/01/2019    Pneumococcal Conjugate 13-Valent 10/27/2015, 10/03/2017    Pneumococcal Polysaccharide PPV23 1945    Tdap 01/01/2010    Zoster 1945      Health Maintenance:         Topic Date Due    MAMMOGRAM  1945    CRC Screening: Colonoscopy  1945         Topic Date Due    INFLUENZA VACCINE  07/01/2019      Medicare Health Risk Assessment:     /78 (BP Location: Right arm, Patient Position: Sitting, Cuff Size: Large)   Pulse 79   Temp 97 8 °F (36 6 °C) (Temporal)   Resp 18   Ht 5' 4" (1 626 m)   Wt 101 kg (223 lb 3 2 oz)   SpO2 97%   BMI 38 31 kg/m²      Vannesa Bailey is here for her Subsequent Wellness visit  Health Risk Assessment:   Patient rates overall health as good  Patient feels that their physical health rating is much worse  Eyesight was rated as slightly worse  Hearing was rated as much worse  Patient feels that their emotional and mental health rating is same  Pain experienced in the last 7 days has been a lot  Patient's pain rating has been 10/10  Patient states that she has experienced no weight loss or gain in last 6 months  Depression Screening:   PHQ-2 Score: 0      Fall Risk Screening:    In the past year, patient has experienced: history of falling in past year    Number of falls: 2 or more  Injured during fall?: Yes      Urinary Incontinence Screening:   Patient has not leaked urine accidently in the last six months  Home Safety:  Patient has trouble with stairs inside or outside of their home  Patient has working smoke alarms and has no working carbon monoxide detector  Home safety hazards include: none  Nutrition:   Current diet is Limited junk food  Medications:   Patient is currently taking over-the-counter supplements  OTC medications include: see medication list  Patient is able to manage medications  Activities of Daily Living (ADLs)/Instrumental Activities of Daily Living (IADLs):   Walk and transfer into and out of bed and chair?: Yes  Dress and groom yourself?: Yes    Bathe or shower yourself?: Yes    Feed yourself?  Yes  Do your laundry/housekeeping?: Yes  Manage your money, pay your bills and track your expenses?: Yes  Make your own meals?: Yes    Do your own shopping?: Yes    Previous Hospitalizations:   Any hospitalizations or ED visits within the last 12 months?: No      Advance Care Planning:   Living will: No    Durable POA for healthcare: No    Advanced directive: No    Five wishes given: No    Patient declined ACP directive: No    End of Life Decisions reviewed with patient: No    Provider agrees with end of life decisions: No      Cognitive Screening:   Provider or family/friend/caregiver concerned regarding cognition?: No    PREVENTIVE SCREENINGS      Cardiovascular Screening:    General: History Lipid Disorder    Due for: Lipid Panel, Lipoprotein, Cholesterol and Triglycerides      Diabetes Screening:       Due for: Blood Glucose      Colorectal Cancer Screening:       Due for: Cologuard      Breast Cancer Screening:       Due for: Mammogram        Cervical Cancer Screening:    General: Patient Declines      Osteoporosis Screening:      Due for: Bone Density CT Appendicular Abdominal Aortic Aneurysm (AAA) Screening:        General: Screening Not Indicated      Lung Cancer Screening:     General: Screening Not Indicated      Hepatitis C Screening:    General: Screening Not Indicated      DANIELLA Dumont

## 2019-10-01 NOTE — PROGRESS NOTES
Assessment/Plan: Will obtain fasting labs to have done  Will obtain MRI of the left shoulder due to her continued pain  She did have an XR done of the left knee in July 2018 showing Degenerative changes, medial compartment and patellofemoral joint  Will refer her to Ortho for possible injections or further imaging  Will start on Voltaren 50 mg BID as needed for her pain  She was advised to watch for any bleeding and take with food  Will give scripts for mammogram, bone denisty, and Cologuard  She is deferring GYN  Bp stable 116/78 is taking Novasc daily  Will give Flu vaccine today  She is deferring the Pneumonia vaccine  Will follow up in 6 months or sooner if need be  She was advised if any issues or concerns please call the office        No problem-specific Assessment & Plan notes found for this encounter           Problem List Items Addressed This Visit        Digestive    Esophageal reflux    Relevant Orders    Comprehensive metabolic panel    CBC and differential       Endocrine    Hypothyroidism    Relevant Orders    Comprehensive metabolic panel    CBC and differential    TSH, 3rd generation with Free T4 reflex       Cardiovascular and Mediastinum    SVT (supraventricular tachycardia) (HCC)    Relevant Medications    amLODIPine (NORVASC) 2 5 mg tablet    Other Relevant Orders    Comprehensive metabolic panel    CBC and differential    Essential hypertension    Relevant Medications    amLODIPine (NORVASC) 2 5 mg tablet    Other Relevant Orders    Comprehensive metabolic panel    CBC and differential       Musculoskeletal and Integument    Arthritis of left knee    Relevant Medications    diclofenac sodium (VOLTAREN) 50 mg EC tablet    Other Relevant Orders    Ambulatory referral to Orthopedic Surgery    C-reactive protein    Sedimentation rate, automated       Other    Hyperlipidemia    Relevant Orders    Comprehensive metabolic panel    CBC and differential    Lipid panel    Morbid obesity (HCC)    Relevant Orders    Comprehensive metabolic panel    CBC and differential    Vitamin D deficiency    Relevant Orders    Comprehensive metabolic panel    CBC and differential    Vitamin D 25 hydroxy    Screening for colon cancer    Relevant Orders    Cologuard    Comprehensive metabolic panel    CBC and differential    Screening for breast cancer    Relevant Orders    Mammo screening bilateral w 3d & cad    Comprehensive metabolic panel    CBC and differential    BMI 38 0-38 9,adult    Relevant Orders    Comprehensive metabolic panel    CBC and differential    Arthralgia    Relevant Medications    diclofenac sodium (VOLTAREN) 50 mg EC tablet    Other Relevant Orders    C-reactive protein    Sedimentation rate, automated    Acute pain of left shoulder    Relevant Medications    diclofenac sodium (VOLTAREN) 50 mg EC tablet    Other Relevant Orders    MRI shoulder left wo contrast    Postmenopausal    Relevant Orders    DXA bone density spine hip and pelvis    Medicare annual wellness visit, subsequent - Primary      Other Visit Diagnoses     Need for influenza vaccination        Relevant Orders    influenza vaccine, 2617-7006, high-dose, PF 0 5 mL (FLUZONE HIGH-DOSE) (Completed)            Subjective:      Patient ID: Angelia Quintana is a 75 y o  female  Angelia Duong is here today for a follow up visit  She was seen by Me in July of 2018 as a new patient  She was living in [place] and just moved back  She is having continued issues with her knee and did fall twice hurting her shoulder both times  She states she is wearing a sling due to her shoulder bothering her  She is having left knee pain with pain in her hip as well  She states she is taking Motrin but this is not helping  She is having hair loss and weight gain and has not had lab work done in quite some time  She will be due for her screenings and will do a Cologuard  She is deferring a GYN exam  She denies any chest pain, SOB, or palpitations   She offers no depression or anxiety  She states she would like the Flu vaccine  She will offers no other issues  The following portions of the patient's history were reviewed and updated as appropriate:   She  has a past medical history of Allergic, Hypertension, and Hypothyroid  She   Patient Active Problem List    Diagnosis Date Noted    Screening for colon cancer 10/01/2019    Screening for breast cancer 10/01/2019    BMI 38 0-38 9,adult 10/01/2019    Arthritis of left knee 10/01/2019    Arthralgia 10/01/2019    Acute pain of left shoulder 10/01/2019    Postmenopausal 10/01/2019    Medicare annual wellness visit, subsequent 10/01/2019    Essential hypertension 07/31/2018    Obstructive sleep apnea 05/04/2015    SVT (supraventricular tachycardia) (HCC) 04/02/2015    Elevated alkaline phosphatase level 02/26/2015    Impaired fasting glucose 02/26/2015    Palpitations 02/26/2015    Vitamin D deficiency 05/05/2014    Hearing loss 03/24/2014    Hyperlipidemia 03/24/2014    Esophageal reflux 03/21/2014    Hypothyroidism 03/21/2014    Morbid obesity ([address] ) 03/21/2014     She  has a past surgical history that includes Dilation and curettage of uterus and Gallbladder surgery  Her family history includes Emphysema in her father and mother  She  reports that she has never smoked  She has never used smokeless tobacco  She reports that she does not drink alcohol or use drugs    Current Outpatient Medications   Medication Sig Dispense Refill    amLODIPine (NORVASC) 2 5 mg tablet Take 2 5 mg by mouth daily      atorvastatin (LIPITOR) 20 mg tablet Take 1 tablet (20 mg total) by mouth daily 90 tablet 3    Cholecalciferol (VITAMIN D3) 2000 units capsule Take 1 capsule (2,000 Units total) by mouth daily 1 capsule 3    fluticasone (FLONASE) 50 mcg/act nasal spray 1 spray into each nostril daily      levothyroxine 75 mcg tablet Take 1 tablet (75 mcg total) by mouth daily 90 tablet 3    montelukast (SINGULAIR) 10 mg tablet Take 10 mg by mouth daily  11    omeprazole (PriLOSEC) 20 mg delayed release capsule Take 1 capsule (20 mg total) by mouth daily 90 capsule 3    cetirizine (ZyrTEC) 10 mg tablet Take 1 tablet (10 mg total) by mouth daily (Patient not taking: Reported on 10/1/2019) 30 tablet 3    diclofenac sodium (VOLTAREN) 50 mg EC tablet Take one tablet by mouth every 12 hours as needed 60 tablet 0    losartan (COZAAR) 50 mg tablet Take 0 5 tablets (25 mg total) by mouth daily (Patient not taking: Reported on 10/1/2019) 90 tablet 3     No current facility-administered medications for this visit  Current Outpatient Medications on File Prior to Visit   Medication Sig    amLODIPine (NORVASC) 2 5 mg tablet Take 2 5 mg by mouth daily    atorvastatin (LIPITOR) 20 mg tablet Take 1 tablet (20 mg total) by mouth daily    Cholecalciferol (VITAMIN D3) 2000 units capsule Take 1 capsule (2,000 Units total) by mouth daily    fluticasone (FLONASE) 50 mcg/act nasal spray 1 spray into each nostril daily    levothyroxine 75 mcg tablet Take 1 tablet (75 mcg total) by mouth daily    montelukast (SINGULAIR) 10 mg tablet Take 10 mg by mouth daily    omeprazole (PriLOSEC) 20 mg delayed release capsule Take 1 capsule (20 mg total) by mouth daily    cetirizine (ZyrTEC) 10 mg tablet Take 1 tablet (10 mg total) by mouth daily (Patient not taking: Reported on 10/1/2019)    losartan (COZAAR) 50 mg tablet Take 0 5 tablets (25 mg total) by mouth daily (Patient not taking: Reported on 10/1/2019)     No current facility-administered medications on file prior to visit  She is allergic to honey tussin [dextromethorphan]; penicillins; and shrimp extract allergy skin test     Review of Systems   Constitutional: Negative  HENT: Negative  Eyes: Negative  Respiratory: Negative  Cardiovascular: Negative  Gastrointestinal: Negative  Endocrine: Negative  Genitourinary: Negative      Musculoskeletal: Positive for arthralgias and myalgias  Skin: Negative  Allergic/Immunologic: Negative  Neurological: Negative  Hematological: Negative  Psychiatric/Behavioral: Negative  Below is the patient's most recent value for Albumin, ALT, AST, BUN, Calcium, Chloride, Cholesterol, CO2, Creatinine, GFR, Glucose, HDL, Hematocrit, Hemoglobin, Hemoglobin A1C, LDL, Magnesium, Phosphorus, Platelets, Potassium, PSA, Sodium, Triglycerides, and WBC  Lab Results   Component Value Date    ALT 32 07/31/2018    AST 32 07/31/2018    BUN 10 07/31/2018    CALCIUM 8 7 07/31/2018     (H) 07/31/2018    CHOL 200 02/17/2015    CO2 27 07/31/2018    CREATININE 1 04 07/31/2018    HDL 59 07/31/2018    HCT 40 1 07/31/2018    HGB 12 7 07/31/2018    HGBA1C 5 9 (H) 02/17/2015     07/31/2018    K 3 5 07/31/2018     02/17/2015    TRIG 108 07/31/2018    WBC 6 78 07/31/2018     Note: for a comprehensive list of the patient's lab results, access the Results Review activity  Objective:      /78 (BP Location: Right arm, Patient Position: Sitting, Cuff Size: Large)   Pulse 79   Temp 97 8 °F (36 6 °C) (Temporal)   Resp 18   Ht 5' 4" (1 626 m)   Wt 101 kg (223 lb 3 2 oz)   SpO2 97%   BMI 38 31 kg/m²          Physical Exam   Constitutional: She appears well-developed and well-nourished  HENT:   Head: Normocephalic and atraumatic  Right Ear: External ear normal    Left Ear: External ear normal    Nose: Nose normal    Mouth/Throat: Oropharynx is clear and moist    Eyes: Pupils are equal, round, and reactive to light  Conjunctivae and EOM are normal    Neck: Normal range of motion  Neck supple  Cardiovascular: Normal rate, regular rhythm, normal heart sounds and intact distal pulses  Pulmonary/Chest: Effort normal and breath sounds normal    Abdominal: Soft  Bowel sounds are normal    Musculoskeletal: Normal range of motion  She exhibits tenderness  To left knee and shoulder   Skin: Skin is warm and dry   Capillary refill takes less than 2 seconds  Psychiatric: She has a normal mood and affect  Her behavior is normal  Judgment and thought content normal    Vitals reviewed  BMI Counseling: Body mass index is 38 31 kg/m²  The BMI is above normal  Nutrition recommendations include reducing portion sizes, decreasing overall calorie intake, 3-5 servings of fruits/vegetables daily, reducing fast food intake, consuming healthier snacks, decreasing soda and/or juice intake, moderation in carbohydrate intake, increasing intake of lean protein, reducing intake of saturated fat and trans fat and reducing intake of cholesterol

## 2019-10-02 ENCOUNTER — APPOINTMENT (OUTPATIENT)
Dept: LAB | Facility: HOSPITAL | Age: 74
End: 2019-10-02
Payer: MEDICARE

## 2019-10-02 DIAGNOSIS — K21.9 GASTROESOPHAGEAL REFLUX DISEASE WITHOUT ESOPHAGITIS: ICD-10-CM

## 2019-10-02 DIAGNOSIS — E78.2 MIXED HYPERLIPIDEMIA: ICD-10-CM

## 2019-10-02 DIAGNOSIS — I47.1 SVT (SUPRAVENTRICULAR TACHYCARDIA) (HCC): ICD-10-CM

## 2019-10-02 DIAGNOSIS — E55.9 VITAMIN D DEFICIENCY: ICD-10-CM

## 2019-10-02 DIAGNOSIS — E03.9 HYPOTHYROIDISM, UNSPECIFIED TYPE: ICD-10-CM

## 2019-10-02 DIAGNOSIS — E66.01 MORBID OBESITY (HCC): ICD-10-CM

## 2019-10-02 DIAGNOSIS — M17.12 ARTHRITIS OF LEFT KNEE: ICD-10-CM

## 2019-10-02 DIAGNOSIS — M25.50 ARTHRALGIA, UNSPECIFIED JOINT: ICD-10-CM

## 2019-10-02 DIAGNOSIS — Z12.11 SCREENING FOR COLON CANCER: ICD-10-CM

## 2019-10-02 DIAGNOSIS — I10 ESSENTIAL HYPERTENSION: ICD-10-CM

## 2019-10-02 DIAGNOSIS — Z12.39 SCREENING FOR BREAST CANCER: ICD-10-CM

## 2019-10-02 LAB
25(OH)D3 SERPL-MCNC: 29.7 NG/ML (ref 30–100)
ALBUMIN SERPL BCP-MCNC: 3.6 G/DL (ref 3.5–5)
ALP SERPL-CCNC: 158 U/L (ref 46–116)
ALT SERPL W P-5'-P-CCNC: 18 U/L (ref 12–78)
ANION GAP SERPL CALCULATED.3IONS-SCNC: 8 MMOL/L (ref 4–13)
AST SERPL W P-5'-P-CCNC: 25 U/L (ref 5–45)
BASOPHILS # BLD AUTO: 0.04 THOUSANDS/ΜL (ref 0–0.1)
BASOPHILS NFR BLD AUTO: 1 % (ref 0–1)
BILIRUB SERPL-MCNC: 0.6 MG/DL (ref 0.2–1)
BUN SERPL-MCNC: 15 MG/DL (ref 5–25)
CALCIUM SERPL-MCNC: 8.6 MG/DL (ref 8.3–10.1)
CHLORIDE SERPL-SCNC: 103 MMOL/L (ref 100–108)
CHOLEST SERPL-MCNC: 170 MG/DL (ref 50–200)
CO2 SERPL-SCNC: 30 MMOL/L (ref 21–32)
CREAT SERPL-MCNC: 1.11 MG/DL (ref 0.6–1.3)
CRP SERPL QL: 16.6 MG/L
EOSINOPHIL # BLD AUTO: 0.15 THOUSAND/ΜL (ref 0–0.61)
EOSINOPHIL NFR BLD AUTO: 3 % (ref 0–6)
ERYTHROCYTE [DISTWIDTH] IN BLOOD BY AUTOMATED COUNT: 13.8 % (ref 11.6–15.1)
ERYTHROCYTE [SEDIMENTATION RATE] IN BLOOD: 16 MM/HOUR (ref 0–20)
GFR SERPL CREATININE-BSD FRML MDRD: 49 ML/MIN/1.73SQ M
GLUCOSE P FAST SERPL-MCNC: 101 MG/DL (ref 65–99)
HCT VFR BLD AUTO: 41.4 % (ref 34.8–46.1)
HDLC SERPL-MCNC: 71 MG/DL (ref 40–60)
HGB BLD-MCNC: 13.2 G/DL (ref 11.5–15.4)
IMM GRANULOCYTES # BLD AUTO: 0.01 THOUSAND/UL (ref 0–0.2)
IMM GRANULOCYTES NFR BLD AUTO: 0 % (ref 0–2)
LDLC SERPL CALC-MCNC: 86 MG/DL (ref 0–100)
LYMPHOCYTES # BLD AUTO: 0.96 THOUSANDS/ΜL (ref 0.6–4.47)
LYMPHOCYTES NFR BLD AUTO: 18 % (ref 14–44)
MCH RBC QN AUTO: 28.7 PG (ref 26.8–34.3)
MCHC RBC AUTO-ENTMCNC: 31.9 G/DL (ref 31.4–37.4)
MCV RBC AUTO: 90 FL (ref 82–98)
MONOCYTES # BLD AUTO: 0.4 THOUSAND/ΜL (ref 0.17–1.22)
MONOCYTES NFR BLD AUTO: 7 % (ref 4–12)
NEUTROPHILS # BLD AUTO: 3.87 THOUSANDS/ΜL (ref 1.85–7.62)
NEUTS SEG NFR BLD AUTO: 71 % (ref 43–75)
NONHDLC SERPL-MCNC: 99 MG/DL
NRBC BLD AUTO-RTO: 0 /100 WBCS
PLATELET # BLD AUTO: 251 THOUSANDS/UL (ref 149–390)
PMV BLD AUTO: 10.6 FL (ref 8.9–12.7)
POTASSIUM SERPL-SCNC: 3.7 MMOL/L (ref 3.5–5.3)
PROT SERPL-MCNC: 7.5 G/DL (ref 6.4–8.2)
RBC # BLD AUTO: 4.6 MILLION/UL (ref 3.81–5.12)
SODIUM SERPL-SCNC: 141 MMOL/L (ref 136–145)
TRIGL SERPL-MCNC: 66 MG/DL
TSH SERPL DL<=0.05 MIU/L-ACNC: 2.98 UIU/ML (ref 0.36–3.74)
WBC # BLD AUTO: 5.43 THOUSAND/UL (ref 4.31–10.16)

## 2019-10-02 PROCEDURE — 85652 RBC SED RATE AUTOMATED: CPT

## 2019-10-02 PROCEDURE — 84443 ASSAY THYROID STIM HORMONE: CPT

## 2019-10-02 PROCEDURE — 80061 LIPID PANEL: CPT

## 2019-10-02 PROCEDURE — 85025 COMPLETE CBC W/AUTO DIFF WBC: CPT

## 2019-10-02 PROCEDURE — 36415 COLL VENOUS BLD VENIPUNCTURE: CPT

## 2019-10-02 PROCEDURE — 86140 C-REACTIVE PROTEIN: CPT

## 2019-10-02 PROCEDURE — 82306 VITAMIN D 25 HYDROXY: CPT

## 2019-10-02 PROCEDURE — 80053 COMPREHEN METABOLIC PANEL: CPT

## 2019-10-03 DIAGNOSIS — E55.9 VITAMIN D DEFICIENCY: Primary | ICD-10-CM

## 2019-10-07 ENCOUNTER — HOSPITAL ENCOUNTER (OUTPATIENT)
Dept: MRI IMAGING | Facility: HOSPITAL | Age: 74
Discharge: HOME | End: 2019-10-07

## 2019-10-07 DIAGNOSIS — M25.512 ACUTE PAIN OF LEFT SHOULDER: ICD-10-CM

## 2019-10-22 ENCOUNTER — TELEPHONE (OUTPATIENT)
Dept: INTERNAL MEDICINE CLINIC | Facility: CLINIC | Age: 74
End: 2019-10-22

## 2019-10-22 NOTE — TELEPHONE ENCOUNTER
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

## 2019-10-23 DIAGNOSIS — K21.9 GASTROESOPHAGEAL REFLUX DISEASE WITHOUT ESOPHAGITIS: ICD-10-CM

## 2019-10-23 RX ORDER — OMEPRAZOLE 20 MG/1
20 CAPSULE, DELAYED RELEASE ORAL DAILY
Qty: 90 CAPSULE | Refills: 3 | Status: SHIPPED | OUTPATIENT
Start: 2019-10-23 | End: 2020-01-31 | Stop reason: SDUPTHER

## 2019-10-23 NOTE — TELEPHONE ENCOUNTER
Received a call from [facility] requesting a refill on omeprazole  Verified sig and pharmacy  Aware of 48 hr fill policy  Please advise

## 2019-10-24 NOTE — TELEPHONE ENCOUNTER
Late entry  Did review with Fercho Medrano on 10/23/19  Pt verbalized understanding  Provided ortho number

## 2019-10-25 ENCOUNTER — OFFICE VISIT (OUTPATIENT)
Dept: OBGYN CLINIC | Facility: CLINIC | Age: 74
End: 2019-10-25
Payer: MEDICARE

## 2019-10-25 ENCOUNTER — APPOINTMENT (OUTPATIENT)
Dept: RADIOLOGY | Facility: MEDICAL CENTER | Age: 74
End: 2019-10-25
Payer: MEDICARE

## 2019-10-25 VITALS
DIASTOLIC BLOOD PRESSURE: 76 MMHG | HEART RATE: 73 BPM | BODY MASS INDEX: 37.9 KG/M2 | HEIGHT: 64 IN | SYSTOLIC BLOOD PRESSURE: 134 MMHG | WEIGHT: 222 LBS

## 2019-10-25 DIAGNOSIS — M25.512 ACUTE PAIN OF LEFT SHOULDER: ICD-10-CM

## 2019-10-25 DIAGNOSIS — M75.122 NONTRAUMATIC COMPLETE TEAR OF LEFT ROTATOR CUFF: Primary | ICD-10-CM

## 2019-10-25 PROCEDURE — 99203 OFFICE O/P NEW LOW 30 MIN: CPT | Performed by: ORTHOPAEDIC SURGERY

## 2019-10-25 PROCEDURE — 73030 X-RAY EXAM OF SHOULDER: CPT

## 2019-10-25 NOTE — PROGRESS NOTES
Assessment:     1  Nontraumatic complete tear of left rotator cuff          Plan:     Problem List Items Addressed This Visit        Musculoskeletal and Integument    Nontraumatic complete tear of left rotator cuff - Primary     50-year-old female with a left rotator cuff tear  I went over the diagnosis, x-rays, and MRI with her  We discussed surgical and nonsurgical management of the issue  She elected to proceed with nonsurgical management  Have referred her to physical therapy for scapular stabilization, strengthening, and rotator cuff recovery  She will follow up with me if she is failing to have significant improvement in 6-8 weeks  Recommended continuing the anti-inflammatory, adding Tylenol as needed  We discussed ice and heat  Relevant Orders    XR shoulder 2+ vw left    Ambulatory referral to Physical Therapy           Patient ID: Eric Quintana is a 75 y o  female  Chief Complaint:  Left shoulder pain    HPI:    50-year-old female here today for her left shoulder pain  She has no specific injury that reached she recalls hurting her shoulder  She has had two falls in the last year but no recollection of any specific shoulder injury  About two months ago she woke up in the middle of the night with her arm on a pillow up high and when she brought her arm down she felt like she heard a recommend a crunch  He got better initially, but then about a month ago she was lifting something heavy and began having pain again  She has been using a sling since that time  She has not been lifting her arm up over her shoulder level  At times she has pain that radiates down toward the elbow  The pain is mostly localized in the posterior lateral aspect of the shoulder  She is right-hand dominant  She was recent given diclofenac  She is here today to find out what is wrong with her shoulder and what can be done for it        Allergy:  Allergies   Allergen Reactions    Honey Tussin [Dextromethorphan]     Penicillins     Shrimp Extract Allergy Skin Test        Medications:  all current active meds have been reviewed    Past Medical History:  Past Medical History:   Diagnosis Date    Allergic     Hypertension     last assessed 5/4/15     Hypothyroid        Past Surgical History:  Past Surgical History:   Procedure Laterality Date    DILATION AND CURETTAGE OF UTERUS      GALLBLADDER SURGERY         Family History:  Family History   Problem Relation Age of Onset    Emphysema Mother     Emphysema Father        Social History:  Social History     Substance and Sexual Activity   Alcohol Use No     Social History     Substance and Sexual Activity   Drug Use No     Social History     Tobacco Use   Smoking Status Never Smoker   Smokeless Tobacco Never Used           ROS:  Review of Systems   Constitutional: Positive for fatigue  HENT: Positive for hearing loss  Eyes: Positive for visual disturbance  Respiratory: Negative  Cardiovascular: Negative  Gastrointestinal: Negative  Endocrine: Negative  Genitourinary: Negative  Musculoskeletal: Positive for arthralgias  Skin: Negative  Allergic/Immunologic: Negative  Neurological: Negative  Hematological: Negative  Psychiatric/Behavioral: Negative  All other systems reviewed and are negative  Objective:  BP Readings from Last 1 Encounters:   10/25/19 134/76      Wt Readings from Last 1 Encounters:   10/25/19 101 kg (222 lb)        BMI:   Estimated body mass index is 38 11 kg/m² as calculated from the following:    Height as of this encounter: 5' 4" (1 626 m)  Weight as of this encounter: 101 kg (222 lb)  EXAM:   Physical Exam   Constitutional: She appears well-developed and well-nourished  No distress  HENT:   Head: Normocephalic and atraumatic  Eyes: Right eye exhibits no discharge  Left eye exhibits no discharge  Neck: Normal range of motion  Neck supple  No tracheal deviation present  Cardiovascular: Normal rate and regular rhythm  Pulmonary/Chest: Effort normal and breath sounds normal  No respiratory distress  She exhibits no tenderness  Abdominal: Soft  She exhibits no distension  There is no tenderness  Neurological: She is alert  Skin: Skin is warm and dry  She is not diaphoretic  No erythema  Psychiatric: She has a normal mood and affect  Her behavior is normal    Vitals reviewed  Right Shoulder Exam     Tenderness   The patient is experiencing no tenderness  Range of Motion   The patient has normal right shoulder ROM  Muscle Strength   The patient has normal right shoulder strength  Tests   Apprehension: negative  Jacobs test: negative  Cross arm: negative  Impingement: negative    Other   Erythema: absent  Sensation: normal  Pulse: present      Left Shoulder Exam     Tenderness   Left shoulder tenderness location: Tender the posterior lateral deltoid  Range of Motion   The patient has normal left shoulder ROM  Muscle Strength   The patient has normal left shoulder strength  Tests   Apprehension: negative  Jacobs test: negative  Cross arm: negative  Impingement: positive  Drop arm: negative    Other   Erythema: absent  Sensation: normal  Pulse: present     Comments:  Very mild crepitus with shoulder range of motion, negative speed's test              Radiographs:  I have personally reviewed pertinent films in PACS and my interpretation is no bony abnormalities, anterior supraspinatus cuff tear

## 2019-10-25 NOTE — ASSESSMENT & PLAN NOTE
67-year-old female with a left rotator cuff tear  I went over the diagnosis, x-rays, and MRI with her  We discussed surgical and nonsurgical management of the issue  She elected to proceed with nonsurgical management  Have referred her to physical therapy for scapular stabilization, strengthening, and rotator cuff recovery  She will follow up with me if she is failing to have significant improvement in 6-8 weeks  Recommended continuing the anti-inflammatory, adding Tylenol as needed  We discussed ice and heat

## 2019-10-28 DIAGNOSIS — M75.122 NONTRAUMATIC COMPLETE TEAR OF LEFT ROTATOR CUFF: Primary | ICD-10-CM

## 2019-10-29 ENCOUNTER — EVALUATION (OUTPATIENT)
Dept: OCCUPATIONAL THERAPY | Facility: HOME HEALTHCARE | Age: 74
End: 2019-10-29
Payer: MEDICARE

## 2019-10-29 DIAGNOSIS — M75.122 NONTRAUMATIC COMPLETE TEAR OF LEFT ROTATOR CUFF: Primary | ICD-10-CM

## 2019-10-29 PROCEDURE — 97166 OT EVAL MOD COMPLEX 45 MIN: CPT

## 2019-10-29 PROCEDURE — 97014 ELECTRIC STIMULATION THERAPY: CPT

## 2019-10-29 PROCEDURE — 97535 SELF CARE MNGMENT TRAINING: CPT

## 2019-10-29 NOTE — PROGRESS NOTES
OT Evaluation     Today's date: 10/29/2019  Patient name: Mariann Quintana  : 1945  MRN: [id number]  Referring provider: Chata Mandujano MD  Dx:   Encounter Diagnosis     ICD-10-CM    1  Nontraumatic complete tear of left rotator cuff M75 122                   Assessment  Assessment details: Patient presenting to OP OT services with a dx of left rotator cuff tear  Patient reports she experienced "a few fals last year " Patient did not report any issues or symptoms after those falls  Patient reports two months ago she slept with her arm overhead  When she attempted to move it she heard a "pop"  Patient did not treat the symptoms initially  Two weeks passed, when she attended a PCP appointment in which she was referred to have an MRI completed  Patient has MRI results indicate suprispinatus tear  Patient had recent appointment with Dr Jose D Cobian  Patient was given medication to decrease pain and inflammation  Patient was a 10/10 pain and is now a 6/10 pain with use of medication  Patient was referred to OP OT services at this time for conservative management symptoms  Impairments: abnormal coordination, abnormal or restricted ROM and impaired physical strength    Symptom irritability: moderateBarriers to therapy: PMH: HTN, hypothyroid, left Knee injury, brain trumor  Understanding of Dx/Px/POC: good   Prognosis: good    Goals  STGs    Pt will increase  strength by 5-10#  Pt will increase shoulder strength by 1/2 grade  Pt will increase shoulder ROM by 50%     Pt will report an increase ability by 25%    Pt will report a decrease in sensation deficits by 25%    Independent with HEP      LTGs     Pt will increase  strength by an additional 5-10#  Pt will increase shoulder strength by 1-2 grade  Pt will increase shoulder ROM to [facility]       Pt will report an increase ability by 50%    Pt will report an increase in ADL/IADL participation    Pt will report a decrease in sensation deficits by 50%      Plan  Plan details: Patient has presenting to OP OT services with a dx of left rotator cuff tear  Patient demonstrating increased pain, decreased strength, decreased ROM and decreased activity  Pt would benefit from continued Occupational Therapy services three times per week for 4 weeks to return to prior level of function and achieve all established goals  Thank you for the referral!    Patient would benefit from: OT eval and skilled occupational therapy  Referral necessary: Yes  Planned modality interventions: ultrasound, unattended electrical stimulation, thermotherapy: hydrocollator packs and cryotherapy  Planned therapy interventions: massage, manual therapy, joint mobilization, patient education, strengthening, stretching, fine motor coordination training, home exercise program and therapeutic exercise  Frequency: 3x week  Duration in visits: 12  Duration in weeks: 4  Treatment plan discussed with: patient        Subjective Evaluation    History of Present Illness  Onset date: Two Weeks ago    Mechanism of injury: trauma  Mechanism of injury: Left Rotator Cuff Injury          Not a recurrent problem   Quality of life: good    Pain  Current pain rating: 3  At best pain rating: 3  At worst pain ratin  Location: Left [place]  Quality: sharp  Relieving factors: ice and relaxation    Hand dominance: right      Diagnostic Tests  MRI studies: abnormal  Treatments  Current treatment: medication and occupational therapy  Patient Goals  Patient goals for therapy: decreased pain, increased motion, increased strength, independence with ADLs/IADLs and return to sport/leisure activities  Patient goal: Increase overhead movements        Objective     Neurological Testing     Sensation     Shoulder   Left Shoulder   Intact: light touch    Right Shoulder   Intact: light touch    Active Range of Motion   Left Shoulder   Flexion: 115 degrees with pain  Extension: 40 degrees   Abduction: 75 degrees with pain  Adduction: 0 degrees   External rotation 0°: 20 degrees with pain  Internal rotation 0°: 70 degrees     Right Shoulder   Normal active range of motion    Left Elbow   Normal active range of motion    Right Elbow   Normal active range of motion    Left Wrist   Normal active range of motion    Right Wrist   Normal active range of motion    Additional Active Range of Motion Details  Patient demonstrating with decreased left shoulder ROM  Passive Range of Motion   Left Shoulder   Flexion: 110 degrees with pain  Extension: 45 degrees   Abduction: 100 degrees with pain  Adduction: 0 degrees   External rotation 0°: 45 degrees with pain  Internal rotation 0°: 80 degrees     Right Shoulder   Normal passive range of motion    Strength/Myotome Testing     Left Shoulder     Planes of Motion   Flexion: 3-   Extension: 3   Abduction: 3-   Adduction: 3   External rotation at 0°: 3-   Internal rotation at 0°: 3     Right Shoulder   Normal muscle strength    Left Elbow   Flexion: 4  Extension: 4  Forearm supination: 4  Forearm pronation: 4    Right Elbow   Normal strength    Left Wrist/Hand   Wrist extension: 4  Wrist flexion: 4  Radial deviation: 4  Ulnar deviation: 4     (2nd hand position)     Trial 1: 22    Right Wrist/Hand   Normal wrist strength     (2nd hand position)     Trial 1: 35    Additional Strength Details  Patient demonstrating with decreased wrist and  strength of left upper extremity    Tests     Left Shoulder   Positive empty can, full can, painful arc and Speed's  Negative Hawkin's  Right Shoulder   Negative empty can, full can, Hawkin's, painful arc and Speed's  Additional Tests Details  Patient reports positive findings for empty can, full can, Speed's test and painful arc for left upper extremity  Subjective: "It feels good now "      Objective: See treatment diary below      Assessment: Tolerated treatment well   Patient exhibited good technique with therapeutic exercises and would benefit from continued OT  Patient tolerated session well  Patient and therapist discussed exercises as per initial HEP  Patient verbalized understanding of education and demonstrated proper technique  Therapist will make increases as tolerated  Continue with POC        Plan: Continue per plan of care  Progress treatment as tolerated            Precautions N/A       Manual  10/29/2019       PROM Shoulder All Planes                                            Exercise Diary  10/29/2019       Biceps Curl        Shoulder Retraction 2 x 10       Shoulder Rolls 2 x 10       Shoulder Shrugs 2 x 10       Table Slide FF 1 x 10       Table Slide Scaption 1 x 10       Table Slide ABD 1 x 10       Shoulder Pulley FF        Digi-Flex        Sh FF Iso 5 sec x 5       Sh IR Iso 5 sec x 5       Sh Ext Iso 5 sec x 5       Sh ER Iso 5 sec x 5       UBE Machine                                                            Modalities 10/29/2019       E-STIM w/ KRISTY Performed

## 2019-10-30 DIAGNOSIS — M25.50 ARTHRALGIA, UNSPECIFIED JOINT: ICD-10-CM

## 2019-10-30 DIAGNOSIS — M25.512 ACUTE PAIN OF LEFT SHOULDER: ICD-10-CM

## 2019-10-30 DIAGNOSIS — M17.12 ARTHRITIS OF LEFT KNEE: ICD-10-CM

## 2019-10-31 ENCOUNTER — OFFICE VISIT (OUTPATIENT)
Dept: OCCUPATIONAL THERAPY | Facility: HOME HEALTHCARE | Age: 74
End: 2019-10-31
Payer: MEDICARE

## 2019-10-31 DIAGNOSIS — M75.122 NONTRAUMATIC COMPLETE TEAR OF LEFT ROTATOR CUFF: Primary | ICD-10-CM

## 2019-10-31 DIAGNOSIS — E78.49 OTHER HYPERLIPIDEMIA: ICD-10-CM

## 2019-10-31 PROCEDURE — 97014 ELECTRIC STIMULATION THERAPY: CPT

## 2019-10-31 PROCEDURE — 97110 THERAPEUTIC EXERCISES: CPT

## 2019-10-31 RX ORDER — ATORVASTATIN CALCIUM 20 MG/1
20 TABLET, FILM COATED ORAL DAILY
Qty: 90 TABLET | Refills: 3 | Status: SHIPPED | OUTPATIENT
Start: 2019-10-31 | End: 2020-01-31 | Stop reason: SDUPTHER

## 2019-10-31 NOTE — PROGRESS NOTES
Daily Note     Today's date: 10/31/2019  Patient name: Jamil Quintana  : 1945  MRN: [id number]  Referring provider: Celia Barbour MD  Dx:   Encounter Diagnosis     ICD-10-CM    1  Nontraumatic complete tear of left rotator cuff M75 122                   Subjective: I was a little sore after doing the exercises at home  Objective: See treatment diary below      Assessment: Tolerated treatment well  Patient exhibited good technique with therapeutic exercises and would benefit from continued OT Pt presents with fatigue with UBE machine and increased discomfort with shoulder shrugs  Pt states relief from e-stim and heat  Pt tolerated increase in reps of exercises with no increased pain, despite shrugs, presenting with some discomfort  Plan: Continue per plan of care  Progress treatment as tolerated  Progress treament per protocol           Precautions N/A       Manual  10/29/2019 10/31/19      PROM Shoulder All Planes                                            Exercise Diary  10/29/2019 10/31/19      Biceps Curl  2 x 10 cane      Shoulder Retraction 2 x 10 2 x 10      Shoulder Rolls 2 x 10 2 x 10      Shoulder Shrugs 2 x 10 2 x 10      Table Slide FF 1 x 10 2 x 10      Table Slide Scaption 1 x 10 2 x 10      Table Slide ABD 1 x 10 2 x 10      Shoulder Pulley FF        Digi-Flex        Sh FF Iso 5 sec x 5 10 sec x 5      Sh IR Iso 5 sec x 5       Sh Ext Iso 5 sec x 5 10 sec x 5      Sh ER Iso 5 sec x 5       UBE Machine  7 min F                                                          Modalities 10/29/2019 10/31/19      E-STIM w/ MH Performed Performed

## 2019-11-01 ENCOUNTER — APPOINTMENT (OUTPATIENT)
Dept: PHYSICAL THERAPY | Facility: HOME HEALTHCARE | Age: 74
End: 2019-11-01
Payer: MEDICARE

## 2019-11-05 ENCOUNTER — OFFICE VISIT (OUTPATIENT)
Dept: OCCUPATIONAL THERAPY | Facility: HOME HEALTHCARE | Age: 74
End: 2019-11-05
Payer: MEDICARE

## 2019-11-05 DIAGNOSIS — M75.122 NONTRAUMATIC COMPLETE TEAR OF LEFT ROTATOR CUFF: Primary | ICD-10-CM

## 2019-11-05 PROCEDURE — 97110 THERAPEUTIC EXERCISES: CPT

## 2019-11-05 PROCEDURE — 97535 SELF CARE MNGMENT TRAINING: CPT

## 2019-11-05 NOTE — PROGRESS NOTES
Daily Note     Today's date: 2019  Patient name: Naima Quintana  : 1945  MRN: [id number]  Referring provider: Nate Hunter MD  Dx:   Encounter Diagnosis     ICD-10-CM    1  Nontraumatic complete tear of left rotator cuff M75 122                   Subjective: I have to tell ya, I have been hurting the last two days  I dont think I can do therapy everyday  Objective: See treatment diary below      Assessment: Tolerated treatment well  Patient exhibited good technique with therapeutic exercises and would benefit from continued OT Increased time taken this date educating pt on proper form of exercises for optimal benefit and decreased pain  Therapist providing verbal, tactile, and visual cues throughout session  Pt tolerated increase to FW this date with minimal discomfort and pain  Plan: Continue per plan of care  Progress treatment as tolerated  Progress treament per protocol           Precautions N/A       Manual  10/29/2019 10/31/19 11/5/19     PROM Shoulder All Planes                                            Exercise Diary  10/29/2019 10/31/19 11/5/19     Biceps Curl  2 x 10 cane 1# 2 x 10     Shoulder Retraction 2 x 10 2 x 10 1# 2 x 10     Shoulder Rolls 2 x 10 2 x 10 1# 2 x 10     Shoulder Shrugs 2 x 10 2 x 10 1# 2 x 10     Table Slide FF 1 x 10 2 x 10 2 x 10     Table Slide Scaption 1 x 10 2 x 10 2 x 10     Table Slide ABD 1 x 10 2 x 10 2 x 10     Shoulder Pulley FF   2 min     Digi-Flex   Blue 2 x 10     Sh FF Iso 5 sec x 5 10 sec x 5 5 sec x 5     Sh IR Iso 5 sec x 5  5 sec x 5     Sh Ext Iso 5 sec x 5 10 sec x 5 5 sec x 5     Sh ER Iso 5 sec x 5  5 sec x 5     UBE Machine  7 min F 10 min F/B                                                         Modalities 10/29/2019 10/31/19 11/5/19     E-STIM w/ MH Performed Performed Performed

## 2019-11-07 ENCOUNTER — APPOINTMENT (OUTPATIENT)
Dept: OCCUPATIONAL THERAPY | Facility: HOME HEALTHCARE | Age: 74
End: 2019-11-07
Payer: MEDICARE

## 2019-11-12 ENCOUNTER — OFFICE VISIT (OUTPATIENT)
Dept: OCCUPATIONAL THERAPY | Facility: HOME HEALTHCARE | Age: 74
End: 2019-11-12
Payer: MEDICARE

## 2019-11-12 DIAGNOSIS — M75.122 NONTRAUMATIC COMPLETE TEAR OF LEFT ROTATOR CUFF: Primary | ICD-10-CM

## 2019-11-12 DIAGNOSIS — E03.9 HYPOTHYROIDISM, UNSPECIFIED TYPE: ICD-10-CM

## 2019-11-12 PROCEDURE — 97014 ELECTRIC STIMULATION THERAPY: CPT

## 2019-11-12 PROCEDURE — 97110 THERAPEUTIC EXERCISES: CPT

## 2019-11-12 RX ORDER — LEVOTHYROXINE SODIUM 0.07 MG/1
TABLET ORAL
Qty: 90 TABLET | Refills: 3 | Status: SHIPPED | OUTPATIENT
Start: 2019-11-12 | End: 2020-01-31 | Stop reason: SDUPTHER

## 2019-11-12 NOTE — PROGRESS NOTES
Daily Note     Today's date: 2019  Patient name: Brennon Quintana  : 1945  MRN: [id number]  Referring provider: Kristal Lucas MD  Dx:   Encounter Diagnosis     ICD-10-CM    1  Nontraumatic complete tear of left rotator cuff M75 122                   Subjective: I stopped doing the exercises every single day, and I dont hurt as much, so I am sticking to that  Objective: See treatment diary below      Assessment: Tolerated treatment well  Patient exhibited good technique with therapeutic exercises and would benefit from continued OT Pt states she completes her HEP program every other day instead of every day, proving to be less painful overall  Therapist continues to review form of isometrics with pt in order for optimal benefit at home  Pt states verbal understanding and uses visual aid of HEP sheets in order to complete appropriately  Pt presents with better form this date compared to previous session  Pt tolerating increase in reps this date, with increased fatigue but not pain  Plan: Continue per plan of care  Progress treatment as tolerated  Progress treament per protocol           Precautions N/A       Manual  10/29/2019 10/31/19 11/5/19 11/12/19    PROM Shoulder All Planes                                            Exercise Diary  10/29/2019 10/31/19 11/5/19 11/12/19    Biceps Curl  2 x 10 cane 1# 2 x 10 1# 2 x 15    Shoulder Retraction 2 x 10 2 x 10 1# 2 x 10 1# 2 x 15    Shoulder Rolls 2 x 10 2 x 10 1# 2 x 10 1# 2 x 15    Shoulder Shrugs 2 x 10 2 x 10 1# 2 x 10 1# 2 x 15    Table Slide FF 1 x 10 2 x 10 2 x 10 2 x 10    Table Slide Scaption 1 x 10 2 x 10 2 x 10 2 x 10    Table Slide ABD 1 x 10 2 x 10 2 x 10 2 x 10    Shoulder Pulley FF   2 min 1 min    Digi-Flex   Blue 2 x 10 Blue 2 x 10    Sh FF Iso 5 sec x 5 10 sec x 5 5 sec x 5 5 sec x 20    Sh IR Iso 5 sec x 5  5 sec x 5 5 sec x 20    Sh Ext Iso 5 sec x 5 10 sec x 5 5 sec x 5 5 sec x 20    Sh ER Iso 5 sec x 5  5 sec x 5 5 sec x 20 UBE Machine  7 min F 10 min F/B 11 5 F/B min                                                        Modalities 10/29/2019 10/31/19 11/5/19 11/12/19    E-STIM w/ MH Performed Performed Performed Performed

## 2019-11-14 ENCOUNTER — OFFICE VISIT (OUTPATIENT)
Dept: OCCUPATIONAL THERAPY | Facility: HOME HEALTHCARE | Age: 74
End: 2019-11-14
Payer: MEDICARE

## 2019-11-14 DIAGNOSIS — M75.122 NONTRAUMATIC COMPLETE TEAR OF LEFT ROTATOR CUFF: Primary | ICD-10-CM

## 2019-11-14 PROCEDURE — 97014 ELECTRIC STIMULATION THERAPY: CPT

## 2019-11-14 PROCEDURE — 97140 MANUAL THERAPY 1/> REGIONS: CPT

## 2019-11-14 PROCEDURE — 97110 THERAPEUTIC EXERCISES: CPT

## 2019-11-14 NOTE — PROGRESS NOTES
Daily Note     Today's date: 2019  Patient name: Angelia Quintana  : 1945  MRN: [id number]  Referring provider: Vianca Chicas MD  Dx:   Encounter Diagnosis     ICD-10-CM    1  Nontraumatic complete tear of left rotator cuff M75 122                   Subjective: I was not able to do this at all before, so I know its getting better  Objective: See treatment diary below      Assessment: Tolerated treatment well  Patient exhibited good technique with therapeutic exercises and would benefit from continued OT Pt presents with decreased pain this date and stiffness which she feels is from arthritis, but is not sure  Pt tolerated manual stretching well this date reaching hand behind head shoulder flexion, by end of stretching  Pt was very pleased  Therapist educating pt on new exercises and stretches witch will be incorporated into pts HEP when therapist deems safe  Pt notes increase pulling with table stretches in addition to cane stretch  Therapist encouraging pt to ice at home this evening or over the weekend due to increased stretching and potential discomfort this date  E-Stim with heat applied to decrease symptoms  Plan: Continue per plan of care  Progress treatment as tolerated  Progress treament per protocol           Precautions N/A       Manual   10/31/19 11/5/19 11/12/19 11/14/19   PROM Shoulder All Planes     15 min                                       Exercise Diary   10/31/19 11/5/19 11/12/19 11/14/19   Biceps Curl  2 x 10 cane 1# 2 x 10 1# 2 x 15 1# 2 x 15   Shoulder Retraction  2 x 10 1# 2 x 10 1# 2 x 15 1# 2 x 15   Shoulder Rolls  2 x 10 1# 2 x 10 1# 2 x 15 1# 2 x 15   Shoulder Shrugs  2 x 10 1# 2 x 10 1# 2 x 15 1# 2 x 15   Table Slide FF  2 x 10 2 x 10 2 x 10 2 x 15   Table Slide Scaption  2 x 10 2 x 10 2 x 10 2 x 15   Table Slide ABD  2 x 10 2 x 10 2 x 10 2 x 15   Shoulder Pulley FF   2 min 1 min 2 min   Digi-Flex   Blue 2 x 10 Blue 2 x 10 Blue 2 x 20   Sh FF Iso  10 sec x 5 5 sec x 5 5 sec x 20    Sh IR Iso   5 sec x 5 5 sec x 20    Sh Ext Iso  10 sec x 5 5 sec x 5 5 sec x 20    Sh ER Iso   5 sec x 5 5 sec x 20    UBE Machine  7 min F 10 min F/B 11 5 F/B min 10 F/B min   Cane Abduction     3 sec x 15   Seated ER Stretch     15 sec x 3   Cane ER     3 sec x 10   Cane Flexion        Seated Abd stretch     15 sec x 3   Armpit Stretch     15 sec x 3       Modalities  10/31/19 11/5/19 11/12/19 11/14/19   E-STIM w/   Performed Performed Performed Performed

## 2019-11-19 ENCOUNTER — OFFICE VISIT (OUTPATIENT)
Dept: OCCUPATIONAL THERAPY | Facility: HOME HEALTHCARE | Age: 74
End: 2019-11-19
Payer: MEDICARE

## 2019-11-19 DIAGNOSIS — M75.122 NONTRAUMATIC COMPLETE TEAR OF LEFT ROTATOR CUFF: Primary | ICD-10-CM

## 2019-11-19 PROCEDURE — 97014 ELECTRIC STIMULATION THERAPY: CPT

## 2019-11-19 PROCEDURE — 97110 THERAPEUTIC EXERCISES: CPT

## 2019-11-19 PROCEDURE — 97140 MANUAL THERAPY 1/> REGIONS: CPT

## 2019-11-19 NOTE — PROGRESS NOTES
Daily Note     Today's date: 2019  Patient name: Trudy Quintana  : 1945  MRN: [id number]  Referring provider: Domo Patterson MD  Dx:   Encounter Diagnosis     ICD-10-CM    1  Nontraumatic complete tear of left rotator cuff M75 122                   Subjective: I know I have gotten better I can reach above my shoulder, I dont really do much soley with my left because im not left handed  Objective: See treatment diary below      Assessment: Tolerated treatment well  Patient exhibited good technique with therapeutic exercises and would benefit from continued OT Pt presents very satisfied with progress thus far  Pt states she can now reach above her head, behind her back, as well as completing arm bike easier  Pt tolerated 2# FW this date with no increased pain yet increased fatigue  Pt also tolerated TB exercises this date with minimal discomfort  Plan: Continue per plan of care  Progress treatment as tolerated  Progress treament per protocol           Precautions N/A       Manual  19   PROM Shoulder All Planes 10 min    15 min                                       Exercise Diary  19   Biceps Curl 2# 2 x 10  1# 2 x 10 1# 2 x 15 1# 2 x 15   Shoulder Retraction 2# 2 x 10  1# 2 x 10 1# 2 x 15 1# 2 x 15   Shoulder Rolls 2# 2 x 10 2 way  1# 2 x 10 1# 2 x 15 1# 2 x 15   Shoulder Shrugs 2# 2 x 10  1# 2 x 10 1# 2 x 15 1# 2 x 15   Table Slide FF   2 x 10 2 x 10 2 x 15   Table Slide Scaption   2 x 10 2 x 10 2 x 15   Table Slide ABD   2 x 10 2 x 10 2 x 15   Shoulder Pulley FF 2 min  2 min 1 min 2 min   Digi-Flex   Blue 2 x 10 Blue 2 x 10 Blue 2 x 20   Sh FF Iso   5 sec x 5 5 sec x 20    Sh IR Iso   5 sec x 5 5 sec x 20    Sh Ext Iso TB Yellow 2 x 10  5 sec x 5 5 sec x 20    Sh ER Iso TB Yellow 2 x 10  5 sec x 5 5 sec x 20    UBE Machine 11 min F/B min  10 min F/B 11 5 F/B min 10 F/B min   Cane Abduction 3 sec x 15    3 sec x 15   Seated ER Stretch     15 sec x 3   Cane ER 3 sec x 15    3 sec x 10   Cane Flexion        Seated Abd stretch     15 sec x 3   Armpit Stretch 30 sec x 2    15 sec x 3       Modalities 11/19/19 11/5/19 11/12/19 11/14/19   E-STIM w/ MH Performed  Performed Performed Performed

## 2019-11-21 ENCOUNTER — OFFICE VISIT (OUTPATIENT)
Dept: OCCUPATIONAL THERAPY | Facility: HOME HEALTHCARE | Age: 74
End: 2019-11-21
Payer: MEDICARE

## 2019-11-21 DIAGNOSIS — M75.122 NONTRAUMATIC COMPLETE TEAR OF LEFT ROTATOR CUFF: Primary | ICD-10-CM

## 2019-11-21 PROCEDURE — 97535 SELF CARE MNGMENT TRAINING: CPT

## 2019-11-21 PROCEDURE — 97110 THERAPEUTIC EXERCISES: CPT

## 2019-11-21 NOTE — PROGRESS NOTES
Daily Note     Today's date: 2019  Patient name: Quiana Quintana  : 1945  MRN: [id number]  Referring provider: Diallo Meza MD  Dx:   Encounter Diagnosis     ICD-10-CM    1  Nontraumatic complete tear of left rotator cuff M75 122                   Subjective: I was a little sore, but not as much as I thought I was going to be  Objective: See treatment diary below      Assessment: Tolerated treatment well  Patient demonstrated fatigue post treatment, exhibited good technique with therapeutic exercises and would benefit from continued OT Therapist holding stretching this date due to addition of several new exercises and trying to prevent increased pain for the weekend  Pt tolerated additional theraband exercises  Plan: Continue per plan of care  Progress treatment as tolerated  Progress treament per protocol           Precautions N/A       Manual  19   PROM Shoulder All Planes 10 min    15 min                                       Exercise Diary  19   Biceps Curl 2# 2 x 10 2# 2 x 10  1# 2 x 15 1# 2 x 15   Shoulder Retraction 2# 2 x 10 2# 2 x 10  1# 2 x 15 1# 2 x 15   Shoulder Rolls 2# 2 x 10 2 way 2# 2 x 10  1# 2 x 15 1# 2 x 15   Shoulder Shrugs 2# 2 x 10 2# 2 x 10  1# 2 x 15 1# 2 x 15   Table Slide FF    2 x 10 2 x 15   Table Slide Scaption    2 x 10 2 x 15   Table Slide ABD    2 x 10 2 x 15   Shoulder Pulley FF 2 min 2 min  1 min 2 min   Digi-Flex  Black 2 x 20  Blue 2 x 10 Blue 2 x 20   Sh FF Iso    5 sec x 20    Sh IR Iso    5 sec x 20    Sh Ext Iso TB Yellow 2 x 10 TB Yellow 2 x 15  5 sec x 20    Sh ER Iso TB Yellow 2 x 10 TB Yellow 2 x 10  5 sec x 20    UBE Machine 11 min F/B min 10 min F/B  min  11 5 F/B min 10 F/B min   Cane Abduction 3 sec x 15 3 sec 2 x 10   3 sec x 15   Seated ER Stretch     15 sec x 3   Cane ER 3 sec x 15    3 sec x 10   Cane Flexion        Seated Abd stretch     15 sec x 3   Armpit Stretch 30 sec x 2 30 sec x 3   15 sec x 3   TB Adduction  Yellow 2 x 10                          Modalities 11/19/19 11/21/19 11/12/19 11/14/19   E-STIM w/ MH Performed Performed  Performed Performed

## 2019-11-25 DIAGNOSIS — E55.9 VITAMIN D DEFICIENCY: ICD-10-CM

## 2019-11-26 ENCOUNTER — EVALUATION (OUTPATIENT)
Dept: OCCUPATIONAL THERAPY | Facility: HOME HEALTHCARE | Age: 74
End: 2019-11-26
Payer: MEDICARE

## 2019-11-26 DIAGNOSIS — M75.122 NONTRAUMATIC COMPLETE TEAR OF LEFT ROTATOR CUFF: Primary | ICD-10-CM

## 2019-11-26 PROCEDURE — 97110 THERAPEUTIC EXERCISES: CPT

## 2019-11-26 PROCEDURE — 97168 OT RE-EVAL EST PLAN CARE: CPT

## 2019-11-26 PROCEDURE — 97014 ELECTRIC STIMULATION THERAPY: CPT

## 2019-11-26 NOTE — PROGRESS NOTES
OT Re-Evaluation    Today's date: 2019  Patient name: Marshal Quintana  : 1945  MRN: [id number]  Referring provider: Afshin Almeida MD  Dx:   Encounter Diagnosis     ICD-10-CM    1  Nontraumatic complete tear of left rotator cuff M75 122                   Assessment  Assessment details: Patient presenting to OP OT services with a dx of left rotator cuff tear  Patient reports she experienced "a few fals last year " Patient did not report any issues or symptoms after those falls  Patient reports two months ago she slept with her arm overhead  When she attempted to move it she heard a "pop"  Patient did not treat the symptoms initially  Two weeks passed, when she attended a PCP appointment in which she was referred to have an MRI completed  Patient has MRI results indicate suprispinatus tear  Patient had recent appointment with Dr Elaina Randolph  Patient was given medication to decrease pain and inflammation  Patient was a 10/10 pain and is now a 6/10 pain with use of medication  Patient was referred to OP OT services at this time for conservative management symptoms  Re-assessment completed on 2019  Patient reports improvement since start of care  Patient reports difficulty with reaching overhead and lifting/carrying  Patient reports improvements with strength, ROM and functional use since start of care  Impairments: abnormal coordination, abnormal or restricted ROM and impaired physical strength    Symptom irritability: moderateBarriers to therapy: PMH: HTN, hypothyroid, left Knee injury, brain trumor  Understanding of Dx/Px/POC: good   Prognosis: good    Goals  STGs    Pt will increase  strength by 5-10#  - Met    Pt will increase shoulder strength by 1/2 grade   - Partially Met    Pt will increase shoulder ROM by 50% - Partially Met    Pt will report an increase sleeping ability by 25% - Partially Met    Pt will report a decrease in sensation deficits by 25% - Met    Independent with HEP - Met      LTGs     Pt will increase  strength by an additional 5-10#  - Met    Pt will increase shoulder strength by 1-2 grade  - Partially Met    Pt will increase shoulder ROM to [facility]  - Partially Met    Pt will report an increase sleeping ability by 50% - Partially Met    Pt will report an increase in ADL/IADL participation  - Partially Met    Pt will report a decrease in sensation deficits by 50% - Partially Met      Plan  Plan details: Patient has Consistent attended OP OT services since start of care  Patient has made steady progress towards established goals  Pt has shown improvement start of care, demonstrating decreased pain, increased strength, increased ROM and increased activity  However, pt remains with impairments including remaining ROM and strength deficits, as well as remaining pain  Pt would benefit from continuation of skilled therapy services to further progress POC and address remaining deficits at this time  Thank you! Patient would benefit from: OT eval and skilled occupational therapy  Referral necessary: Yes  Planned modality interventions: ultrasound, unattended electrical stimulation, thermotherapy: hydrocollator packs and cryotherapy  Planned therapy interventions: massage, manual therapy, joint mobilization, patient education, strengthening, stretching, fine motor coordination training, home exercise program and therapeutic exercise  Frequency: 3x week  Duration in visits: 12  Duration in weeks: 4  Treatment plan discussed with: patient        Subjective Evaluation    History of Present Illness  Onset date: Two Weeks ago    Mechanism of injury: trauma  Mechanism of injury: Left Rotator Cuff Injury          Not a recurrent problem   Quality of life: good    Pain  Current pain ratin  At best pain ratin  At worst pain rating: 3  Location: Left [place]  Quality: sharp  Relieving factors: ice and relaxation    Hand dominance: right      Diagnostic Tests  MRI studies: abnormal  Treatments  Current treatment: medication and occupational therapy  Patient Goals  Patient goals for therapy: decreased pain, increased motion, increased strength, independence with ADLs/IADLs and return to sport/leisure activities  Patient goal: Increase overhead movements        Objective     Neurological Testing     Sensation     Shoulder   Left Shoulder   Intact: light touch    Right Shoulder   Intact: light touch    Active Range of Motion   Left Shoulder   Flexion: 110 degrees with pain  Extension: 40 degrees   Abduction: 95 degrees with pain  Adduction: 0 degrees   External rotation 0°: 60 degrees with pain  Internal rotation 0°: 70 degrees     Right Shoulder   Normal active range of motion    Left Elbow   Normal active range of motion    Right Elbow   Normal active range of motion    Left Wrist   Normal active range of motion    Right Wrist   Normal active range of motion    Additional Active Range of Motion Details  Patient demonstrating with decreased left shoulder ROM      At re-assessment, increased AROM for abduction and ER or left UE    Passive Range of Motion   Left Shoulder   Flexion: 110 degrees with pain  Extension: 45 degrees   Abduction: 100 degrees with pain  Adduction: 0 degrees   External rotation 0°: 45 degrees with pain  Internal rotation 0°: 80 degrees     Right Shoulder   Normal passive range of motion    Strength/Myotome Testing     Left Shoulder     Planes of Motion   Flexion: 3-   Extension: 3+   Abduction: 3-   Adduction: 3+   External rotation at 0°: 3   Internal rotation at 0°: 3+     Right Shoulder   Normal muscle strength    Left Elbow   Flexion: 4  Extension: 4  Forearm supination: 4  Forearm pronation: 4    Right Elbow   Normal strength    Left Wrist/Hand   Wrist extension: 4  Wrist flexion: 4  Radial deviation: 4  Ulnar deviation: 4     (2nd hand position)     Trial 1: 45    Right Wrist/Hand   Normal wrist strength     (2nd hand position)     Trial 1: 35    Additional Strength Details  Patient demonstrating with decreased wrist and  strength of left upper extremity    Increased  strength noted at re-assessment    Tests     Left Shoulder   Positive full can, painful arc and Speed's  Negative empty can and Hawkin's  Right Shoulder   Negative empty can, full can, Hawkin's, painful arc and Speed's  Additional Tests Details  Patient reports positive findings for empty can, full can, Speed's test and painful arc for left upper extremity  Resolution of pain with empty can test as compared to start of care  Patient reports a decrease in pain with Speed's test of left UE         Subjective: "It is a lot better "      Objective: See treatment diary below      Assessment: Tolerated treatment well  Patient exhibited good technique with therapeutic exercises and would benefit from continued OT  Patient tolerated increase in thera-band exercises this session to improve strength and functional use  Plan: Continue per plan of care  Progress treatment as tolerated            Precautions N/A       Manual  11/19/19 11/21/19 11/26/2019 11/14/19   PROM Shoulder All Planes 10 min    15 min                                       Exercise Diary  11/19/19 11/21/19 11/26/2019 11/14/19   Biceps Curl 2# 2 x 10 2# 2 x 10 2# 2 x 10  1# 2 x 15   Shoulder Retraction 2# 2 x 10 2# 2 x 10 2# 2 x 10  1# 2 x 15   Shoulder Rolls 2# 2 x 10 2 way 2# 2 x 10 2# 2 x 10  1# 2 x 15   Shoulder Shrugs 2# 2 x 10 2# 2 x 10 2# 2 x 10  1# 2 x 15   Table Slide FF     2 x 15   Table Slide Scaption     2 x 15   Table Slide ABD     2 x 15   Shoulder Pulley FF 2 min 2 min 2 Min  2 min   Digi-Flex  Black 2 x 20 Black 2 x 20  Blue 2 x 20   Sh FF Iso        Sh IR Iso        Sh Ext Iso TB Yellow 2 x 10 TB Yellow 2 x 15 TB Red 2 x 15     Sh ER Iso TB Yellow 2 x 10 TB Yellow 2 x 10 TB Red 2 x 10     UBE Machine 11 min F/B min 10 min F/B  min 10 Min F/B  10 F/B min   Cane Abduction 3 sec x 15 3 sec 2 x 10 3 sec 2 x 10  3 sec x 15   Seated ER Stretch     15 sec x 3   Cane ER 3 sec x 15    3 sec x 10   Cane Flexion        Seated Abd stretch     15 sec x 3   Armpit Stretch 30 sec x 2 30 sec x 3 30 sec x 3  15 sec x 3   TB Adduction  Yellow 2 x 10 Yellow 2 x 10                         Modalities 11/19/19 11/21/19 11/26/2019 11/14/19   E-STIM w/ MH Performed Performed Performed  Performed

## 2019-11-29 ENCOUNTER — OFFICE VISIT (OUTPATIENT)
Dept: OCCUPATIONAL THERAPY | Facility: HOME HEALTHCARE | Age: 74
End: 2019-11-29
Payer: MEDICARE

## 2019-11-29 DIAGNOSIS — M25.512 ACUTE PAIN OF LEFT SHOULDER: ICD-10-CM

## 2019-11-29 DIAGNOSIS — M17.12 ARTHRITIS OF LEFT KNEE: ICD-10-CM

## 2019-11-29 DIAGNOSIS — M25.50 ARTHRALGIA, UNSPECIFIED JOINT: ICD-10-CM

## 2019-11-29 DIAGNOSIS — M75.122 NONTRAUMATIC COMPLETE TEAR OF LEFT ROTATOR CUFF: Primary | ICD-10-CM

## 2019-11-29 PROCEDURE — 97110 THERAPEUTIC EXERCISES: CPT

## 2019-11-29 PROCEDURE — 97535 SELF CARE MNGMENT TRAINING: CPT

## 2019-11-29 NOTE — PROGRESS NOTES
Daily Note     Today's date: 2019  Patient name: Jamil Quintana  : 1945  MRN: [id number]  Referring provider: Celia Barbour MD  Dx:   Encounter Diagnosis     ICD-10-CM    1  Nontraumatic complete tear of left rotator cuff M75 122                   Subjective: I do know I am getting better, now my knee is what's bothering me, and before, my shoulder is what bothered me  Now, the knee is taking over the pain again  Objective: See treatment diary below      Assessment: Tolerated treatment well  Patient exhibited good technique with therapeutic exercises and would benefit from continued OT  Pt presents with stiff and achiness this date, but not really pain  Therapist educating pt on two stretches to complete at home, answering any questions pt has, and providing verbal and tactile cues for form and safety  Pt stating verbal and practical understanding  Plan: Continue per plan of care  Progress treatment as tolerated  Progress treament per protocol           Precautions N/A       Manual  19    PROM Shoulder All Planes 10 min                                           Exercise Diary  19    Biceps Curl 2# 2 x 10 2# 2 x 10 2# 2 x 10 2# 2 x 15    Shoulder Retraction 2# 2 x 10 2# 2 x 10 2# 2 x 10 2# 2 x 15    Shoulder Rolls 2# 2 x 10 2 way 2# 2 x 10 2# 2 x 10 2# 2 x 15    Shoulder Shrugs 2# 2 x 10 2# 2 x 10 2# 2 x 10 2# 2 x 15    Table Slide FF        Table Slide Scaption        Table Slide ABD        Shoulder Pulley FF 2 min 2 min 2 Min 2 min    Digi-Flex  Black 2 x 20 Black 2 x 20 Black 2 x 20    Sh FF Iso        Sh IR Iso        Sh Ext Iso TB Yellow 2 x 10 TB Yellow 2 x 15 TB Red 2 x 15 TB Red 2 x 15    TB IR    Yellow 2 x 10    Sh ER Iso TB Yellow 2 x 10 TB Yellow 2 x 10 TB Red 2 x 10 TB Yellow 2 x 10    UBE Machine 11 min F/B min 10 min F/B  min 10 Min F/B 10 min F/B    Cane Abduction 3 sec x 15 3 sec 2 x 10 3 sec 2 x 10 Seated ER Stretch        Cane ER 3 sec x 15       Cane Flexion        Seated Abd stretch    30 sec x 3    Armpit Stretch 30 sec x 2 30 sec x 3 30 sec x 3     TB Adduction  Yellow 2 x 10 Yellow 2 x 10 Red 2 x 10    Seated ER Stretch    30 sec x 3                Modalities 11/19/19 11/21/19 11/26/2019 11/29/19    E-STIM w/ MH Performed Performed Performed Performed

## 2019-12-02 ENCOUNTER — OFFICE VISIT (OUTPATIENT)
Dept: OCCUPATIONAL THERAPY | Facility: HOME HEALTHCARE | Age: 74
End: 2019-12-02
Payer: MEDICARE

## 2019-12-02 DIAGNOSIS — M75.122 NONTRAUMATIC COMPLETE TEAR OF LEFT ROTATOR CUFF: Primary | ICD-10-CM

## 2019-12-02 PROCEDURE — 97014 ELECTRIC STIMULATION THERAPY: CPT

## 2019-12-02 PROCEDURE — 97110 THERAPEUTIC EXERCISES: CPT

## 2019-12-02 RX ORDER — CHOLECALCIFEROL (VITAMIN D3) 1250 MCG
CAPSULE ORAL
Qty: 4 CAPSULE | Refills: 3 | Status: SHIPPED | OUTPATIENT
Start: 2019-12-02 | End: 2020-03-02

## 2019-12-02 NOTE — PROGRESS NOTES
Daily Note     Today's date: 2019  Patient name: Brennon Quintana  : 1945  MRN: [id number]  Referring provider: Kristal Lucas MD  Dx:   Encounter Diagnosis     ICD-10-CM    1  Nontraumatic complete tear of left rotator cuff M75 122                   Subjective: Its the backward motion that bothers me  I was up all night watching movies last night, that's not helping being tired and sore  Objective: See treatment diary below      Assessment: Tolerated treatment well  Patient demonstrated fatigue post treatment, exhibited good technique with therapeutic exercises and would benefit from continued OT Pt presents with increased discomfort this date due to weather  Pt tolerating increased reps this date, however increased breaks and time required to complete activities  Therapist to introduce HEP to pt next session with thercezard to incorporate at home  Pt holding stretching this date due to increased discomfort, yet encouraging home stretches as able  Plan: Continue per plan of care  Progress treatment as tolerated  Progress treament per protocol           Precautions N/A       Manual   19   PROM Shoulder All Planes                                            Exercise Diary   19   Biceps Curl  2# 2 x 10 2# 2 x 10 2# 2 x 15 2# 2 x 15   Shoulder Retraction  2# 2 x 10 2# 2 x 10 2# 2 x 15 2# 2 x 15   Shoulder Rolls 2 way  2# 2 x 10 2# 2 x 10 2# 2 x 15 2# 2 x 15   Shoulder Shrugs  2# 2 x 10 2# 2 x 10 2# 2 x 15 2# 2 x 15   Table Slide FF        Table Slide Scaption        Table Slide ABD        Shoulder Pulley FF  2 min 2 Min 2 min 2 min  Abd 1 min   Digi-Flex  Black 2 x 20 Black 2 x 20 Black 2 x 20 Black 2 x 20   Sh FF Iso                Sh Ext Iso  TB Yellow 2 x 15 TB Red 2 x 15 TB Red 2 x 15 TB Red 2 x 15   TB IR    Yellow 2 x 10 Yellow 2 x 10   Sh ER Iso  TB Yellow 2 x 10 TB Red 2 x 10 TB Yellow 2 x 10 TB Yellow 2 x 10   UBE Machine 10 min F/B  min 10 Min F/B 10 min F/B 10 min F/B   Cane Abduction  3 sec 2 x 10 3 sec 2 x 10  3 sec x 10   Seated ER Stretch        Cane ER        Cane Flexion        Seated Abd stretch    30 sec x 3    Armpit Stretch  30 sec x 3 30 sec x 3     TB Adduction  Yellow 2 x 10 Yellow 2 x 10 Red 2 x 10 Red 2 x 10   Seated ER Stretch    30 sec x 3                Modalities  11/21/19 11/26/2019 11/29/19 12/2/19   E-STIM w/ MH  Performed Performed Performed Performed

## 2019-12-05 ENCOUNTER — APPOINTMENT (OUTPATIENT)
Dept: OCCUPATIONAL THERAPY | Facility: HOME HEALTHCARE | Age: 74
End: 2019-12-05
Payer: MEDICARE

## 2019-12-10 ENCOUNTER — OFFICE VISIT (OUTPATIENT)
Dept: OCCUPATIONAL THERAPY | Facility: HOME HEALTHCARE | Age: 74
End: 2019-12-10
Payer: MEDICARE

## 2019-12-10 DIAGNOSIS — M75.122 NONTRAUMATIC COMPLETE TEAR OF LEFT ROTATOR CUFF: Primary | ICD-10-CM

## 2019-12-10 PROCEDURE — 97110 THERAPEUTIC EXERCISES: CPT

## 2019-12-10 PROCEDURE — 97014 ELECTRIC STIMULATION THERAPY: CPT

## 2019-12-10 NOTE — PROGRESS NOTES
Daily Note     Today's date: 12/10/2019  Patient name: Sheri Campos August  : 1945  MRN: [id number]  Referring provider: Karen Hatch MD  Dx:   Encounter Diagnosis     ICD-10-CM    1  Nontraumatic complete tear of left rotator cuff M75 122                   Subjective: "I had a rough week "      Objective: See treatment diary below      Assessment: Tolerated treatment well  Patient exhibited good technique with therapeutic exercises and would benefit from continued OT  Patient continues to report increased pain with abduction of left shoulder  Patient tolerated additional exercises as able to increased ROM  Plan: Continue per plan of care  Progress treatment as tolerated            Precautions N/A       Manual  12/10/2019  2019 11/29/19 12/2/19   PROM Shoulder All Planes                                            Exercise Diary  12/10/2019  2019 11/29/19 12/2/19   Biceps Curl 2# 2 x 15  2# 2 x 10 2# 2 x 15 2# 2 x 15   Shoulder Retraction 2# 2 x 15  2# 2 x 10 2# 2 x 15 2# 2 x 15   Shoulder Rolls 2 way 2# 2 x 15  2# 2 x 10 2# 2 x 15 2# 2 x 15   Shoulder Shrugs 2# 2 x 15  2# 2 x 10 2# 2 x 15 2# 2 x 15   TB Lat Pull Down Red 2 x 10       Shoulder Pulley FF 2 Min  ABD 1 Min  2 Min 2 min 2 min  Abd 1 min   Digi-Flex Black 2 x 20  Black 2 x 20 Black 2 x 20 Black 2 x 20   Sh FF Iso                Sh Ext Iso TB Red 2 x 15  TB Red 2 x 15 TB Red 2 x 15 TB Red 2 x 15   TB IR TB Yellow 2 x 10   Yellow 2 x 10 Yellow 2 x 10   Sh ER Iso TB Yellow 2 x 10  TB Red 2 x 10 TB Yellow 2 x 10 TB Yellow 2 x 10   UBE Machine 10 Min F/B  10 Min F/B 10 min F/B 10 min F/B   Cane Abduction 3 sec x 2 x 10  3 sec 2 x 10  3 sec x 10   Seated ER Stretch        Finger Ladder FF 8, 8, 8, 8, 8       Finger Ladder ABD 3, 3, 3, 3, 3       Seated Abd stretch 30 sec x 3   30 sec x 3    Armpit Stretch   30 sec x 3     TB Adduction Red 2 x 10  Yellow 2 x 10 Red 2 x 10 Red 2 x 10   Seated ER Stretch    30 sec x 3                Modalities 12/10/2019  11/26/2019 11/29/19 12/2/19   E-STIM w/ MH Performed  Performed Performed Performed

## 2019-12-12 ENCOUNTER — OFFICE VISIT (OUTPATIENT)
Dept: OCCUPATIONAL THERAPY | Facility: HOME HEALTHCARE | Age: 74
End: 2019-12-12
Payer: MEDICARE

## 2019-12-12 DIAGNOSIS — M75.122 NONTRAUMATIC COMPLETE TEAR OF LEFT ROTATOR CUFF: Primary | ICD-10-CM

## 2019-12-12 PROCEDURE — 97110 THERAPEUTIC EXERCISES: CPT

## 2019-12-12 PROCEDURE — 97014 ELECTRIC STIMULATION THERAPY: CPT

## 2019-12-12 NOTE — PROGRESS NOTES
Daily Note     Today's date: 2019  Patient name: TANISHA Aaron August  : 1945  MRN: [id number]  Referring provider: Jeff West MD  Dx:   Encounter Diagnosis     ICD-10-CM    1  Nontraumatic complete tear of left rotator cuff M75 122                   Subjective: It has been hurting a little bit here in the last two weeks  Objective: See treatment diary below      Assessment: Tolerated treatment well  Patient exhibited good technique with therapeutic exercises and would benefit from continued OT Pt present this date with increased pain compared to typical sessions  Neck stretches increasing discomfort this date  Therapist adjusting session as needed this date to decrease pain and discomfort for pt exercises  Pt stating concerns with therapy potentially making shoulder worse, with therapist educating pt on potential of this in addition to other co morbidities that may be affecting pain levels  E-Stim with heat applied to decrease symptoms  Pt states relief at end of session with estim and MH  Plan: Continue per plan of care  Progress treatment as tolerated  Progress treament per protocol            Precautions N/A       Manual  12/10/2019 12/12/19  11/29/19 12/2/19   PROM Shoulder All Planes                                            Exercise Diary  12/10/2019 12/12/19  11/29/19 12/2/19   Biceps Curl 2# 2 x 15   2# 2 x 15 2# 2 x 15   Shoulder Retraction 2# 2 x 15 2 x 10  2# 2 x 15 2# 2 x 15   Shoulder Rolls 2 way 2# 2 x 15   2# 2 x 15 2# 2 x 15   Shoulder Shrugs 2# 2 x 15 2 x 10  2# 2 x 15 2# 2 x 15   TB Lat Pull Down Red 2 x 10       Shoulder Pulley FF 2 Min  ABD 1 Min 2 min abd/FF  2 min 2 min  Abd 1 min   Digi-Flex Black 2 x 20   Black 2 x 20 Black 2 x 20   Sh FF Iso                TB Extension TB Red 2 x 15 Red x 15  TB Red 2 x 15 TB Red 2 x 15   TB IR TB Yellow 2 x 10 Yellow x 15  Yellow 2 x 10 Yellow 2 x 10   Sh ER Iso TB Yellow 2 x 10 Yellow x 15  TB Yellow 2 x 10 TB Yellow 2 x 10   UBE Machine 10 Min F/B 10 min F/B  10 min F/B 10 min F/B   Cane Abduction 3 sec x 2 x 10 10 sec x 3   3 sec x 10   Seated ER Stretch        Finger Ladder FF 8, 8, 8, 8, 8       Finger Ladder ABD 3, 3, 3, 3, 3       Seated Abd stretch 30 sec x 3   30 sec x 3    Armpit Stretch  30 sec x 2      TB Adduction Red 2 x 10 Red x 15  Red 2 x 10 Red 2 x 10   Seated ER Stretch    30 sec x 3                Modalities 12/10/2019 12/12/19  11/29/19 12/2/19   E-STIM w/ MH Performed Performed  Performed Performed

## 2019-12-17 ENCOUNTER — OFFICE VISIT (OUTPATIENT)
Dept: OCCUPATIONAL THERAPY | Facility: HOME HEALTHCARE | Age: 74
End: 2019-12-17
Payer: MEDICARE

## 2019-12-17 DIAGNOSIS — M75.122 NONTRAUMATIC COMPLETE TEAR OF LEFT ROTATOR CUFF: Primary | ICD-10-CM

## 2019-12-17 PROCEDURE — 97014 ELECTRIC STIMULATION THERAPY: CPT

## 2019-12-17 PROCEDURE — 97110 THERAPEUTIC EXERCISES: CPT

## 2019-12-17 NOTE — PROGRESS NOTES
Daily Note     Today's date: 2019  Patient name: Vannesa Quintana  : 1945  MRN: [id number]  Referring provider: Belkis Ny MD  Dx:   Encounter Diagnosis     ICD-10-CM    1  Nontraumatic complete tear of left rotator cuff M75 122                   Subjective: "I am going to do some cleaning this weekend "      Objective: See treatment diary below      Assessment: Tolerated treatment well  Patient exhibited good technique with therapeutic exercises and would benefit from continued OT  Patient tolerated increase in theraband exercises this date to improve UE strength  Plan: Continue per plan of care  Progress treatment as tolerated             Precautions N/A       Manual  12/10/2019 12/12/19 2019  12/2/19   PROM Shoulder All Planes                                            Exercise Diary  12/10/2019 12/12/19 2019  12/2/19   Biceps Curl 2# 2 x 15  2# 2 x 10  2# 2 x 15   Shoulder Retraction 2# 2 x 15 2 x 10 2# 2 x 10  2# 2 x 15   Shoulder Rolls 2 way 2# 2 x 15    2# 2 x 15   Shoulder Shrugs 2# 2 x 15 2 x 10 2# 2 x 10  2# 2 x 15   TB Lat Pull Down Red 2 x 10  Red 2 x 10     Shoulder Pulley FF 2 Min  ABD 1 Min 2 min abd/FF 2 Min ABD/FF  2 min  Abd 1 min   Digi-Flex Black 2 x 20  Black 2 x 20  Black 2 x 20   Sh FF Iso                TB Extension TB Red 2 x 15 Red x 15 Red 2 x 15  TB Red 2 x 15   TB IR TB Yellow 2 x 10 Yellow x 15 Yellow 2 x 10  Yellow 2 x 10   Sh ER Iso TB Yellow 2 x 10 Yellow x 15 Yellow 2 x 10  TB Yellow 2 x 10   UBE Machine 10 Min F/B 10 min F/B 10 Min F/B  10 min F/B   Cane Abduction 3 sec x 2 x 10 10 sec x 3   3 sec x 10   Seated ER Stretch        Finger Ladder FF 8, 8, 8, 8, 8       Finger Ladder ABD 3, 3, 3, 3, 3       Seated Abd stretch 30 sec x 3       Armpit Stretch  30 sec x 2      TB Adduction Red 2 x 10 Red x 15 Red 2 x 10  Red 2 x 10   Seated ER Stretch                    Modalities 12/10/2019 12/12/19 2019  12/2/19   E-STIM w/ MH Performed Performed Performed  Performed

## 2019-12-19 ENCOUNTER — OFFICE VISIT (OUTPATIENT)
Dept: OCCUPATIONAL THERAPY | Facility: HOME HEALTHCARE | Age: 74
End: 2019-12-19
Payer: MEDICARE

## 2019-12-19 DIAGNOSIS — M75.122 NONTRAUMATIC COMPLETE TEAR OF LEFT ROTATOR CUFF: Primary | ICD-10-CM

## 2019-12-19 PROCEDURE — 97014 ELECTRIC STIMULATION THERAPY: CPT

## 2019-12-19 PROCEDURE — 97110 THERAPEUTIC EXERCISES: CPT

## 2019-12-19 NOTE — PROGRESS NOTES
Daily Note     Today's date: 2019  Patient name: Art Quintana  : 1945  MRN: [id number]  Referring provider: Lorena Pat MD  Dx:   Encounter Diagnosis     ICD-10-CM    1  Nontraumatic complete tear of left rotator cuff M75 122                   Subjective: "It isn't too bad today "      Objective: See treatment diary below      Assessment: Tolerated treatment well  Patient exhibited good technique with therapeutic exercises and would benefit from continued OT  Patient reports increased shoulder ROM as compared to start of care  Plan: Continue per plan of care  Progress treatment as tolerated             Precautions N/A       Manual  12/10/2019 12/12/19 2019 2019    PROM Shoulder All Planes                                            Exercise Diary  12/10/2019 12/12/19 2019 2019    Biceps Curl 2# 2 x 15  2# 2 x 10 2# 2 x 10    Shoulder Retraction 2# 2 x 15 2 x 10 2# 2 x 10 2# 2 x 10    Shoulder Rolls 2 way 2# 2 x 15       Shoulder Shrugs 2# 2 x 15 2 x 10 2# 2 x 10 2# 2 x 10    TB Lat Pull Down Red 2 x 10  Red 2 x 10 Red 2 x 10    Shoulder Pulley FF 2 Min  ABD 1 Min 2 min abd/FF 2 Min ABD/FF 2 Min ABD/FF    Digi-Flex Black 2 x 20  Black 2 x 20 Black 2 x 20    Sh FF Iso                TB Extension TB Red 2 x 15 Red x 15 Red 2 x 15 Red 2 x 15    TB IR TB Yellow 2 x 10 Yellow x 15 Yellow 2 x 10 Yellow 2 x 10    TB ER TB Yellow 2 x 10 Yellow x 15 Yellow 2 x 10 Yellow 2 x 10    UBE Machine 10 Min F/B 10 min F/B 10 Min F/B 10 Min F/B    Cane Abduction 3 sec x 2 x 10 10 sec x 3  2 x 10    Finger Ladder FF 8, 8, 8, 8, 8       Finger Ladder ABD 3, 3, 3, 3, 3       Seated Abd stretch 30 sec x 3       Armpit Stretch  30 sec x 2      TB Adduction Red 2 x 10 Red x 15 Red 2 x 10 Red 2 x 10    Seated ER Stretch        Table Slides ABD    2 x 10        Modalities 12/10/2019 12/12/19 2019 2019    E-STIM w/ MH Performed Performed Performed Performed

## 2019-12-23 ENCOUNTER — OFFICE VISIT (OUTPATIENT)
Dept: OCCUPATIONAL THERAPY | Facility: HOME HEALTHCARE | Age: 74
End: 2019-12-23
Payer: MEDICARE

## 2019-12-23 DIAGNOSIS — M75.122 NONTRAUMATIC COMPLETE TEAR OF LEFT ROTATOR CUFF: Primary | ICD-10-CM

## 2019-12-23 PROCEDURE — 97110 THERAPEUTIC EXERCISES: CPT

## 2019-12-23 PROCEDURE — 97014 ELECTRIC STIMULATION THERAPY: CPT

## 2019-12-23 NOTE — PROGRESS NOTES
Daily Note     Today's date: 2019  Patient name: Elke Quintana  : 1945  MRN: [id number]  Referring provider: Bere Galicia MD  Dx:   Encounter Diagnosis     ICD-10-CM    1  Nontraumatic complete tear of left rotator cuff M75 122                   Subjective: I am so tired today  Objective: See treatment diary below      Assessment: Tolerated treatment well  Patient exhibited good technique with therapeutic exercises and would benefit from continued OT Pt taking increased time to complete exercises, with therapist not adding any new exercises this date  Pt stating red theraband is still difficult enough at this time  Pt tolerated Nustep this date instead of UBE, with no increased pain, and please to know it would help loosen her knee as well  Plan: Continue per plan of care  Progress treatment as tolerated  Progress treament per protocol            Precautions N/A       Manual   19   PROM Shoulder All Planes                                            Exercise Diary   19   Biceps Curl   2# 2 x 10 2# 2 x 10 2# 2 x 10   Shoulder Retraction  2 x 10 2# 2 x 10 2# 2 x 10 2# 2 x 10   Shoulder Rolls 2 way        Shoulder Shrugs  2 x 10 2# 2 x 10 2# 2 x 10 2# 2 x 10   TB Lat Pull Down   Red 2 x 10 Red 2 x 10 Red 2 x 10   Shoulder Pulley FF  2 min abd/FF 2 Min ABD/FF 2 Min ABD/FF 2 min FF/Abd   Digi-Flex   Black 2 x 20 Black 2 x 20    Sh FF Iso                TB Extension  Red x 15 Red 2 x 15 Red 2 x 15 Red 2 x 15   TB IR  Yellow x 15 Yellow 2 x 10 Yellow 2 x 10 Yellow 2 x 10   TB ER  Yellow x 15 Yellow 2 x 10 Yellow 2 x 10 Yellow 2 x 10   UBE Machine  10 min F/B 10 Min F/B 10 Min F/B Nustep 10 min   Cane Abduction  10 sec x 3  2 x 10 2 x 10   Finger Ladder FF        Finger Ladder ABD        Seated Abd stretch        Armpit Stretch  30 sec x 2      TB Adduction  Red x 15 Red 2 x 10 Red 2 x 10 Red 2 x 15   Seated ER Stretch 30 sec x 3   Table Slides ABD    2 x 10        Modalities  12/12/19 12/17/2019 12/19/2019 12/23/19   E-STIM w/ MH  Performed Performed Performed Performed

## 2019-12-26 ENCOUNTER — APPOINTMENT (OUTPATIENT)
Dept: OCCUPATIONAL THERAPY | Facility: HOME HEALTHCARE | Age: 74
End: 2019-12-26
Payer: MEDICARE

## 2019-12-31 ENCOUNTER — OFFICE VISIT (OUTPATIENT)
Dept: OCCUPATIONAL THERAPY | Facility: HOME HEALTHCARE | Age: 74
End: 2019-12-31
Payer: MEDICARE

## 2019-12-31 DIAGNOSIS — M75.122 NONTRAUMATIC COMPLETE TEAR OF LEFT ROTATOR CUFF: Primary | ICD-10-CM

## 2019-12-31 PROCEDURE — 97014 ELECTRIC STIMULATION THERAPY: CPT

## 2019-12-31 PROCEDURE — 97110 THERAPEUTIC EXERCISES: CPT

## 2019-12-31 NOTE — PROGRESS NOTES
OT Re-Evaluation    Today's date: 2020  Patient name: Leena Quintana  : 1945  MRN: [id number]  Referring provider: Marquise Robert MD  Dx:   Encounter Diagnosis     ICD-10-CM    1  Nontraumatic complete tear of left rotator cuff M75 122        Start Time: 1100  Stop Time: 1210  Total time in clinic (min): 70 minutes    Assessment  Assessment details: Patient presenting to OP OT services with a dx of left rotator cuff tear  Patient reports she experienced "a few fals last year " Patient did not report any issues or symptoms after those falls  Patient reports two months ago she slept with her arm overhead  When she attempted to move it she heard a "pop"  Patient did not treat the symptoms initially  Two weeks passed, when she attended a PCP appointment in which she was referred to have an MRI completed  Patient has MRI results indicate suprispinatus tear  Patient had recent appointment with Dr Nic Zepeda  Patient was given medication to decrease pain and inflammation  Patient was a 10/10 pain and is now a 6/10 pain with use of medication  Patient was referred to OP OT services at this time for conservative management symptoms  Re-assessment completed on 2019  Patient reports improvement since start of care  Patient reports difficulty with reaching overhead and lifting/carrying  Patient reports improvements with strength, ROM and functional use since start of care  Re-assessment completed on 2019  Patient does not have a follow-up appointment at this time  Patient reports noted improvements in ROM and functional use since start of care  Patient states she "wasn't able to do this before" when completing ROM measurements  Patient has not met all established goals and will benefit from continued OT services to return to PLOF         Impairments: abnormal coordination, abnormal or restricted ROM and impaired physical strength    Symptom irritability: moderateBarriers to therapy: PMH: HTN, hypothyroid, left Knee injury, brain trumor  Understanding of Dx/Px/POC: good   Prognosis: good    Goals  STGs    Pt will increase  strength by 5-10#  - Met    Pt will increase shoulder strength by 1/2 grade  - Partially Met    Pt will increase shoulder ROM by 50% - Partially Met    Pt will report an increase sleeping ability by 25% - Partially Met    Pt will report a decrease in sensation deficits by 25% - Met    Independent with HEP - Met      LTGs     Pt will increase  strength by an additional 5-10#  - Met    Pt will increase shoulder strength by 1-2 grade  - Partially Met    Pt will increase shoulder ROM to [facility]  - Partially Met    Pt will report an increase sleeping ability by 50% - Partially Met    Pt will report an increase in ADL/IADL participation  - Partially Met    Pt will report a decrease in sensation deficits by 50% - Partially Met      Plan  Plan details: Patient has consistently attended OP OT services since start of care  Patient has made steady progress towards established goals  Pt has shown improvement start of care, demonstrating decreased pain, increased strength, increased ROM and increased activity  However, pt remains with impairments including remaining ROM and strength deficits, as well as remaining pain  Pt would benefit from continuation of skilled therapy services to further progress POC and address remaining deficits at this time  Thank you!       Patient would benefit from: OT eval and skilled occupational therapy  Referral necessary: Yes  Planned modality interventions: ultrasound, unattended electrical stimulation, thermotherapy: hydrocollator packs and cryotherapy  Planned therapy interventions: massage, manual therapy, joint mobilization, patient education, strengthening, stretching, fine motor coordination training, home exercise program and therapeutic exercise  Frequency: 2x week  Duration in visits: 8  Duration in weeks: 4  Treatment plan discussed with: patient        Subjective Evaluation    History of Present Illness  Onset date: Two Weeks ago  Mechanism of injury: trauma  Mechanism of injury: Left Rotator Cuff Injury          Not a recurrent problem   Quality of life: good    Pain  Current pain ratin  At best pain ratin  At worst pain rating: 3  Location: Left [place]  Quality: sharp  Relieving factors: ice and relaxation    Hand dominance: right      Diagnostic Tests  MRI studies: abnormal  Treatments  Current treatment: medication and occupational therapy  Patient Goals  Patient goals for therapy: decreased pain, increased motion, increased strength, independence with ADLs/IADLs and return to sport/leisure activities  Patient goal: Increase overhead movements        Objective     Neurological Testing     Sensation     Shoulder   Left Shoulder   Intact: light touch    Right Shoulder   Intact: light touch    Active Range of Motion   Left Shoulder   Flexion: 96 degrees with pain  Extension: 40 degrees   Abduction: 85 degrees with pain  Adduction: 0 degrees   External rotation 0°: 60 degrees with pain  Internal rotation 0°: 70 degrees     Right Shoulder   Normal active range of motion    Left Elbow   Normal active range of motion    Right Elbow   Normal active range of motion    Left Wrist   Normal active range of motion    Right Wrist   Normal active range of motion    Additional Active Range of Motion Details  Patient demonstrating with decreased left shoulder ROM      At re-assessment, increased AROM for abduction and ER or left UE    Passive Range of Motion   Left Shoulder   Flexion: 110 degrees with pain  Extension: 45 degrees   Abduction: 100 degrees with pain  Adduction: 0 degrees   External rotation 0°: 45 degrees with pain  Internal rotation 0°: 80 degrees     Right Shoulder   Normal passive range of motion    Strength/Myotome Testing     Left Shoulder     Planes of Motion   Flexion: 3-   Extension: 3+   Abduction: 3-   Adduction: 3+   External rotation at 0°: 3   Internal rotation at 0°: 3+     Right Shoulder   Normal muscle strength    Left Elbow   Flexion: 4  Extension: 4  Forearm supination: 4  Forearm pronation: 4    Right Elbow   Normal strength    Left Wrist/Hand   Wrist extension: 4  Wrist flexion: 4  Radial deviation: 4  Ulnar deviation: 4     (2nd hand position)     Trial 1: 35    Right Wrist/Hand   Normal wrist strength     (2nd hand position)     Trial 1: 45    Additional Strength Details  Patient demonstrating with decreased wrist and  strength of left upper extremity    Increased  strength noted at re-assessment    Tests     Left Shoulder   Positive full can, painful arc and Speed's  Negative empty can and Hawkin's  Right Shoulder   Negative empty can, full can, Hawkin's, painful arc and Speed's  Additional Tests Details  Patient reports positive findings for empty can, full can, Speed's test and painful arc for left upper extremity  Resolution of pain with empty can test as compared to start of care  Patient reports a decrease in pain with Speed's test of left UE      Subjective: "We are staying in tonight "      Objective: See treatment diary below      Assessment: Tolerated treatment well  Patient exhibited good technique with therapeutic exercises and would benefit from continued OT  Patient continues to require verbal cues for proper techniques of exercises  Patient unable to trial increases in parameters due to discomfort with current exercises  Plan: Continue per plan of care  Progress treatment as tolerated             Precautions N/A       Manual  12/31/2019 12/17/2019 12/19/2019 12/23/19   PROM Shoulder All Planes                                            Exercise Diary  12/31/2019 12/17/2019 12/19/2019 12/23/19   Biceps Curl 2# 2 x 10  2# 2 x 10 2# 2 x 10 2# 2 x 10   Shoulder Retraction 2# 2 x 10  2# 2 x 10 2# 2 x 10 2# 2 x 10   Shoulder Shrugs 2# 2 x 10  2# 2 x 10 2# 2 x 10 2# 2 x 10   TB Lat Pull Down Red 2 x 10  Red 2 x 10 Red 2 x 10 Red 2 x 10   Shoulder Pulley FF 2 Min ABD/FF  2 Min ABD/FF 2 Min ABD/FF 2 min FF/Abd   Digi-Flex Black 2 x 20  Black 2 x 20 Black 2 x 20    TB Extension Red 2 x 15  Red 2 x 15 Red 2 x 15 Red 2 x 15   TB IR Yellow 2 x 10  Yellow 2 x 10 Yellow 2 x 10 Yellow 2 x 10   TB ER Yellow 2 x 10  Yellow 2 x 10 Yellow 2 x 10 Yellow 2 x 10   UBE Machine 10 Min F/B  10 Min F/B 10 Min F/B Nustep 10 min   Cane Abduction 2 x 10   2 x 10 2 x 10   Finger Ladder FF        Finger Ladder ABD        Seated Abd stretch        Armpit Stretch        TB Adduction Red 2 x 15  Red 2 x 10 Red 2 x 10 Red 2 x 15   Seated ER Stretch     30 sec x 3   Table Slides ABD 2 x 10   2 x 10        Modalities 12/31/2019 12/17/2019 12/19/2019 12/23/19   E-STIM w/ MH Performed  Performed Performed Performed

## 2020-01-02 ENCOUNTER — OFFICE VISIT (OUTPATIENT)
Dept: OCCUPATIONAL THERAPY | Facility: HOME HEALTHCARE | Age: 75
End: 2020-01-02
Payer: COMMERCIAL

## 2020-01-02 DIAGNOSIS — M75.122 NONTRAUMATIC COMPLETE TEAR OF LEFT ROTATOR CUFF: Primary | ICD-10-CM

## 2020-01-02 PROCEDURE — 97110 THERAPEUTIC EXERCISES: CPT

## 2020-01-02 PROCEDURE — 97014 ELECTRIC STIMULATION THERAPY: CPT

## 2020-01-02 NOTE — PROGRESS NOTES
Daily Note     Today's date: 2020  Patient name: Naima Quintana  : 1945  MRN: [id number]  Referring provider: Nate Hunter MD  Dx:   Encounter Diagnosis     ICD-10-CM    1  Nontraumatic complete tear of left rotator cuff M75 122                   Subjective: "Was that bike harder today?"      Objective: See treatment diary below      Assessment: Tolerated treatment well  Patient exhibited good technique with therapeutic exercises and would benefit from continued OT  Patient tolerated additional AAROM exercises this session to increase ROM and functional use  Plan: Continue per plan of care  Progress treatment as tolerated             Precautions N/A       Manual  2019   PROM Shoulder All Planes                                            Exercise Diary  2019   Biceps Curl 2# 2 x 10 2# 2 x 10  2# 2 x 10 2# 2 x 10   Shoulder Retraction 2# 2 x 10 2# 2 x 10  2# 2 x 10 2# 2 x 10   Shoulder Shrugs 2# 2 x 10 2# 2 x 10  2# 2 x 10 2# 2 x 10   TB Lat Pull Down Red 2 x 10 Red 2 x 10  Red 2 x 10 Red 2 x 10   Shoulder Pulley FF 2 Min ABD/FF 2 Min ABD/FF  2 Min ABD/FF 2 min FF/Abd   Digi-Flex Black 2 x 20 Black 2 x 20  Black 2 x 20    TB Extension Red 2 x 15 Red 2 x 15  Red 2 x 15 Red 2 x 15   TB IR Yellow 2 x 10 Yellow 2 x 10  Yellow 2 x 10 Yellow 2 x 10   TB ER Yellow 2 x 10 Yellow 2 x 10  Yellow 2 x 10 Yellow 2 x 10   UBE Machine 10 Min F/B 10 Min F/B  10 Min F/B Nustep 10 min   Cane Abduction 2 x 10 2 x 10  2 x 10 2 x 10   SPC FF  2 x 10      Finger Ladder ABD        Seated Abd stretch        Armpit Stretch        TB Adduction Red 2 x 15 Red 2 x 15  Red 2 x 10 Red 2 x 15   Seated ER Stretch     30 sec x 3   Table Slides ABD 2 x 10 2 x 10  2 x 10        Modalities 2019   E-STIM w/ MH Performed Performed  Performed Performed

## 2020-01-07 ENCOUNTER — OFFICE VISIT (OUTPATIENT)
Dept: OCCUPATIONAL THERAPY | Facility: HOME HEALTHCARE | Age: 75
End: 2020-01-07
Payer: COMMERCIAL

## 2020-01-07 DIAGNOSIS — M25.50 ARTHRALGIA, UNSPECIFIED JOINT: ICD-10-CM

## 2020-01-07 DIAGNOSIS — M17.12 ARTHRITIS OF LEFT KNEE: ICD-10-CM

## 2020-01-07 DIAGNOSIS — M75.122 NONTRAUMATIC COMPLETE TEAR OF LEFT ROTATOR CUFF: Primary | ICD-10-CM

## 2020-01-07 DIAGNOSIS — M25.512 ACUTE PAIN OF LEFT SHOULDER: ICD-10-CM

## 2020-01-07 PROCEDURE — 97110 THERAPEUTIC EXERCISES: CPT

## 2020-01-07 PROCEDURE — 97014 ELECTRIC STIMULATION THERAPY: CPT

## 2020-01-07 NOTE — PROGRESS NOTES
Daily Note     Today's date: 2020  Patient name: Elke Quintana  : 1945  MRN: [id number]  Referring provider: Bere Galicia MD  Dx:   Encounter Diagnosis     ICD-10-CM    1  Nontraumatic complete tear of left rotator cuff M75 122                   Subjective: I cant believe that my arm is almost all the way up to my shoulder here  I remember when I first started  Objective: See treatment diary below      Assessment: Tolerated treatment well  Patient exhibited good technique with therapeutic exercises and would benefit from continued OT Pt tolerated increase from red to green theraband this date with no increased pain, yet increased difficulty noted  Pt also tolerated increase in FW from 2-3# with pt noting increased weight upon taking weights from therapist  Pt taking increased time to complete exercises in addition to seated breaks  Plan: Continue per plan of care  Progress treatment as tolerated  Progress treament per protocol            Precautions N/A                                                           Exercise Diary  2019   Biceps Curl 2# 2 x 10 2# 2 x 10 3# 2 x 10  2# 2 x 10   Shoulder Retraction 2# 2 x 10 2# 2 x 10 3# 2 x 10  2# 2 x 10   Shoulder Shrugs 2# 2 x 10 2# 2 x 10 3# 2 x 10  2# 2 x 10   TB Lat Pull Down Red 2 x 10 Red 2 x 10   Red 2 x 10   Shoulder Pulley FF 2 Min ABD/FF 2 Min ABD/FF 2 min FF/ABD  2 min FF/Abd   Digi-Flex Black 2 x 20 Black 2 x 20      TB Extension Red 2 x 15 Red 2 x 15 Green 2 x 10  Red 2 x 15   TB IR Yellow 2 x 10 Yellow 2 x 10 Green 2 x 10  Yellow 2 x 10   TB ER Yellow 2 x 10 Yellow 2 x 10 Green 2 x 10  Yellow 2 x 10   UBE Machine 10 Min F/B 10 Min F/B 10 min F/B  Nustep 10 min   Cane Abduction 2 x 10 2 x 10 3 sec 2 x 10  2 x 10   SPC FF  2 x 10 2 x 10     Finger Ladder ABD        Seated Abd stretch        Cane Extension   2 x 10     TB Adduction Red 2 x 15 Red 2 x 15 Green 2 x 10  Red 2 x 15   Seated ER Stretch 30 sec x 3   Table Slides ABD 2 x 10 2 x 10 2 x 10         Modalities 12/31/2019 01/02/2019 1/7/20 12/23/19   E-STIM w/ MH Performed Performed Performed  Performed

## 2020-01-09 ENCOUNTER — OFFICE VISIT (OUTPATIENT)
Dept: OCCUPATIONAL THERAPY | Facility: HOME HEALTHCARE | Age: 75
End: 2020-01-09
Payer: COMMERCIAL

## 2020-01-09 DIAGNOSIS — M75.122 NONTRAUMATIC COMPLETE TEAR OF LEFT ROTATOR CUFF: Primary | ICD-10-CM

## 2020-01-09 PROCEDURE — 97110 THERAPEUTIC EXERCISES: CPT

## 2020-01-09 PROCEDURE — 97014 ELECTRIC STIMULATION THERAPY: CPT

## 2020-01-09 NOTE — PROGRESS NOTES
Daily Note     Today's date: 2020  Patient name: Vannesa Quintana  : 1945  MRN: [id number]  Referring provider: Belkis Ny MD  Dx:   Encounter Diagnosis     ICD-10-CM    1  Nontraumatic complete tear of left rotator cuff M75 122                   Subjective: I can really tell a difference not starting on the bike, I am more sore doing the bike after some of the other things  It feels like you punched me in the arm right there, sore like  Objective: See treatment diary below      Assessment: Tolerated treatment well  Patient exhibited good technique with therapeutic exercises and would benefit from continued OT Pt presents with increased soreness this date, however, agreeable to keep session the same as last time  Therapist continuing to educate pt on proper technique of stretches and exercises  Therapist to educate pt on HEP with theraband next session  Plan: Continue per plan of care  Progress treatment as tolerated  Progress treament per protocol            Precautions N/A                                                           Exercise Diary  2019    Biceps Curl 2# 2 x 10 2# 2 x 10 3# 2 x 10 3# 2 x 10    Shoulder Retraction 2# 2 x 10 2# 2 x 10 3# 2 x 10 3# 2 x 10    Shoulder Shrugs 2# 2 x 10 2# 2 x 10 3# 2 x 10 3# 2 x 10    TB Lat Pull Down Red 2 x 10 Red 2 x 10      Shoulder Pulley FF 2 Min ABD/FF 2 Min ABD/FF 2 min FF/ABD 2 min FF/Abd    Digi-Flex Black 2 x 20 Black 2 x 20      TB Extension Red 2 x 15 Red 2 x 15 Green 2 x 10 Green 2 x 10    TB IR Yellow 2 x 10 Yellow 2 x 10 Green 2 x 10 Green 2 x 10    TB ER Yellow 2 x 10 Yellow 2 x 10 Green 2 x 10 Green 2 x 10    UBE Machine 10 Min F/B 10 Min F/B 10 min F/B 10 min F/B    Cane Abduction 2 x 10 2 x 10 3 sec 2 x 10 3 sec 2 x 10    SPC FF  2 x 10 2 x 10 2 x 10    Finger Ladder ABD        Seated Abd stretch        Cane Extension   2 x 10 2 x 10    TB Adduction Red 2 x 15 Red 2 x 15 Green 2 x 10 Green 2 x 10 Seated ER Stretch        Table Slides ABD 2 x 10 2 x 10 2 x 10         Modalities 12/31/2019 01/02/2019 1/7/20 1/9/20    E-STIM w/ MH Performed Performed Performed Performed

## 2020-01-13 ENCOUNTER — APPOINTMENT (OUTPATIENT)
Dept: OCCUPATIONAL THERAPY | Facility: HOME HEALTHCARE | Age: 75
End: 2020-01-13
Payer: COMMERCIAL

## 2020-01-16 ENCOUNTER — APPOINTMENT (OUTPATIENT)
Dept: OCCUPATIONAL THERAPY | Facility: HOME HEALTHCARE | Age: 75
End: 2020-01-16
Payer: COMMERCIAL

## 2020-01-30 NOTE — PROGRESS NOTES
OT Discharge    Today's date: 2020  Patient name: Vannesa Quintana  : 1945  MRN: [id number]  Referring provider: Belkis Ny MD  Dx:   Encounter Diagnosis     ICD-10-CM    1  Nontraumatic complete tear of left rotator cuff M75 122        Start Time: 1400  Stop Time: 1510  Total time in clinic (min): 70 minutes    Assessment  Assessment details: Patient presenting to OP OT services with a dx of left rotator cuff tear  Patient reports she experienced "a few fals last year " Patient did not report any issues or symptoms after those falls  Patient reports two months ago she slept with her arm overhead  When she attempted to move it she heard a "pop"  Patient did not treat the symptoms initially  Two weeks passed, when she attended a PCP appointment in which she was referred to have an MRI completed  Patient has MRI results indicate suprispinatus tear  Patient had recent appointment with Dr Santos George  Patient was given medication to decrease pain and inflammation  Patient was a 10/10 pain and is now a 6/10 pain with use of medication  Patient was referred to OP OT services at this time for conservative management symptoms  Re-assessment completed on 2019  Patient reports improvement since start of care  Patient reports difficulty with reaching overhead and lifting/carrying  Patient reports improvements with strength, ROM and functional use since start of care  Re-assessment completed on 2019  Patient does not have a follow-up appointment at this time  Patient reports noted improvements in ROM and functional use since start of care  Patient states she "wasn't able to do this before" when completing ROM measurements  Patient has not met all established goals and will benefit from continued OT services to return to PLOF         Impairments: abnormal coordination, abnormal or restricted ROM and impaired physical strength    Symptom irritability: moderateBarriers to therapy: PMH: HTN, hypothyroid, left Knee injury, brain trumor  Understanding of Dx/Px/POC: good   Prognosis: good    Goals  STGs    Pt will increase  strength by 5-10#  - Met    Pt will increase shoulder strength by 1/2 grade  - Partially Met    Pt will increase shoulder ROM by 50% - Partially Met    Pt will report an increase sleeping ability by 25% - Partially Met    Pt will report a decrease in sensation deficits by 25% - Met    Independent with HEP - Met      LTGs     Pt will increase  strength by an additional 5-10#  - Met    Pt will increase shoulder strength by 1-2 grade  - Partially Met    Pt will increase shoulder ROM to [facility]  - Partially Met    Pt will report an increase sleeping ability by 50% - Partially Met    Pt will report an increase in ADL/IADL participation  - Partially Met    Pt will report a decrease in sensation deficits by 50% - Partially Met      Plan  Plan details: Patient has Consistent attended OP OT services since start of care  Patient has attended 19 visits since start of care  Patient last missed visit/visit was on 2020  Patient and therapist discussed discontinued and have agreed on Plan of Care  Patient did not return to therapy secondary to high co-pay from insurance change  Thank you for the referral  Please refer to patient's last assessment for most recent objective measurements  Subjective Evaluation    History of Present Illness  Onset date: Two Weeks ago    Mechanism of injury: trauma  Mechanism of injury: Left Rotator Cuff Injury          Not a recurrent problem   Quality of life: good    Pain  Current pain ratin  At best pain ratin  At worst pain rating: 3  Location: Left [place]  Quality: sharp  Relieving factors: ice and relaxation    Hand dominance: right      Diagnostic Tests  MRI studies: abnormal  Treatments  Current treatment: medication and occupational therapy  Patient Goals  Patient goals for therapy: decreased pain, increased motion, increased strength, independence with ADLs/IADLs and return to sport/leisure activities  Patient goal: Increase overhead movements        Objective     Neurological Testing     Sensation     Shoulder   Left Shoulder   Intact: light touch    Right Shoulder   Intact: light touch    Active Range of Motion   Left Shoulder   Flexion: 96 degrees with pain  Extension: 40 degrees   Abduction: 85 degrees with pain  Adduction: 0 degrees   External rotation 0°: 60 degrees with pain  Internal rotation 0°: 70 degrees     Right Shoulder   Normal active range of motion    Left Elbow   Normal active range of motion    Right Elbow   Normal active range of motion    Left Wrist   Normal active range of motion    Right Wrist   Normal active range of motion    Additional Active Range of Motion Details  Patient demonstrating with decreased left shoulder ROM      At re-assessment, increased AROM for abduction and ER or left UE    Passive Range of Motion   Left Shoulder   Flexion: 110 degrees with pain  Extension: 45 degrees   Abduction: 100 degrees with pain  Adduction: 0 degrees   External rotation 0°: 45 degrees with pain  Internal rotation 0°: 80 degrees     Right Shoulder   Normal passive range of motion    Strength/Myotome Testing     Left Shoulder     Planes of Motion   Flexion: 3-   Extension: 3+   Abduction: 3-   Adduction: 3+   External rotation at 0°: 3   Internal rotation at 0°: 3+     Right Shoulder   Normal muscle strength    Left Elbow   Flexion: 4  Extension: 4  Forearm supination: 4  Forearm pronation: 4    Right Elbow   Normal strength    Left Wrist/Hand   Wrist extension: 4  Wrist flexion: 4  Radial deviation: 4  Ulnar deviation: 4     (2nd hand position)     Trial 1: 35    Right Wrist/Hand   Normal wrist strength     (2nd hand position)     Trial 1: 45    Additional Strength Details  Patient demonstrating with decreased wrist and  strength of left upper extremity    Increased  strength noted at re-assessment    Tests     Left Shoulder   Positive full can, painful arc and Speed's  Negative empty can and Hawkin's  Right Shoulder   Negative empty can, full can, Hawkin's, painful arc and Speed's  Additional Tests Details  Patient reports positive findings for empty can, full can, Speed's test and painful arc for left upper extremity  Resolution of pain with empty can test as compared to start of care   Patient reports a decrease in pain with Speed's test of left UE

## 2020-01-31 DIAGNOSIS — E78.49 OTHER HYPERLIPIDEMIA: ICD-10-CM

## 2020-01-31 DIAGNOSIS — E03.9 HYPOTHYROIDISM, UNSPECIFIED TYPE: ICD-10-CM

## 2020-01-31 DIAGNOSIS — K21.9 GASTROESOPHAGEAL REFLUX DISEASE WITHOUT ESOPHAGITIS: ICD-10-CM

## 2020-01-31 DIAGNOSIS — M17.12 ARTHRITIS OF LEFT KNEE: ICD-10-CM

## 2020-01-31 DIAGNOSIS — M25.512 ACUTE PAIN OF LEFT SHOULDER: ICD-10-CM

## 2020-01-31 DIAGNOSIS — M25.50 ARTHRALGIA, UNSPECIFIED JOINT: ICD-10-CM

## 2020-01-31 DIAGNOSIS — I10 ESSENTIAL HYPERTENSION: Primary | ICD-10-CM

## 2020-01-31 RX ORDER — OMEPRAZOLE 20 MG/1
20 CAPSULE, DELAYED RELEASE ORAL DAILY
Qty: 90 CAPSULE | Refills: 3 | Status: SHIPPED | OUTPATIENT
Start: 2020-01-31 | End: 2020-04-01 | Stop reason: SDUPTHER

## 2020-01-31 RX ORDER — ATORVASTATIN CALCIUM 20 MG/1
20 TABLET, FILM COATED ORAL DAILY
Qty: 90 TABLET | Refills: 3 | Status: SHIPPED | OUTPATIENT
Start: 2020-01-31 | End: 2020-02-04 | Stop reason: SDUPTHER

## 2020-01-31 RX ORDER — AMLODIPINE BESYLATE 2.5 MG/1
2.5 TABLET ORAL DAILY
Qty: 60 TABLET | Refills: 3 | Status: SHIPPED | OUTPATIENT
Start: 2020-01-31 | End: 2020-02-04 | Stop reason: SDUPTHER

## 2020-01-31 RX ORDER — LEVOTHYROXINE SODIUM 0.07 MG/1
75 TABLET ORAL DAILY
Qty: 90 TABLET | Refills: 3 | Status: SHIPPED | OUTPATIENT
Start: 2020-01-31 | End: 2020-02-04 | Stop reason: SDUPTHER

## 2020-02-04 DIAGNOSIS — E78.49 OTHER HYPERLIPIDEMIA: ICD-10-CM

## 2020-02-04 DIAGNOSIS — I10 ESSENTIAL HYPERTENSION: ICD-10-CM

## 2020-02-04 DIAGNOSIS — E03.9 HYPOTHYROIDISM, UNSPECIFIED TYPE: ICD-10-CM

## 2020-02-04 RX ORDER — LEVOTHYROXINE SODIUM 0.07 MG/1
75 TABLET ORAL DAILY
Qty: 90 TABLET | Refills: 3 | Status: SHIPPED | OUTPATIENT
Start: 2020-02-04 | End: 2020-04-01 | Stop reason: SDUPTHER

## 2020-02-04 RX ORDER — AMLODIPINE BESYLATE 2.5 MG/1
2.5 TABLET ORAL DAILY
Qty: 60 TABLET | Refills: 3 | Status: SHIPPED | OUTPATIENT
Start: 2020-02-04 | End: 2020-04-01 | Stop reason: SDUPTHER

## 2020-02-04 RX ORDER — ATORVASTATIN CALCIUM 20 MG/1
20 TABLET, FILM COATED ORAL DAILY
Qty: 90 TABLET | Refills: 3 | Status: SHIPPED | OUTPATIENT
Start: 2020-02-04 | End: 2020-04-01 | Stop reason: SDUPTHER

## 2020-03-02 DIAGNOSIS — E55.9 VITAMIN D DEFICIENCY: ICD-10-CM

## 2020-03-02 RX ORDER — CHOLECALCIFEROL (VITAMIN D3) 1250 MCG
CAPSULE ORAL
Qty: 12 CAPSULE | Refills: 1 | Status: SHIPPED | OUTPATIENT
Start: 2020-03-02 | End: 2020-04-01 | Stop reason: SDUPTHER

## 2020-04-01 DIAGNOSIS — E55.9 VITAMIN D DEFICIENCY: ICD-10-CM

## 2020-04-01 DIAGNOSIS — E03.9 HYPOTHYROIDISM, UNSPECIFIED TYPE: ICD-10-CM

## 2020-04-01 DIAGNOSIS — E78.49 OTHER HYPERLIPIDEMIA: ICD-10-CM

## 2020-04-01 DIAGNOSIS — I10 ESSENTIAL HYPERTENSION: ICD-10-CM

## 2020-04-01 DIAGNOSIS — K21.9 GASTROESOPHAGEAL REFLUX DISEASE WITHOUT ESOPHAGITIS: ICD-10-CM

## 2020-04-02 RX ORDER — LEVOTHYROXINE SODIUM 0.07 MG/1
75 TABLET ORAL DAILY
Qty: 90 TABLET | Refills: 3 | Status: SHIPPED | OUTPATIENT
Start: 2020-04-02 | End: 2020-11-10 | Stop reason: SDUPTHER

## 2020-04-02 RX ORDER — OMEPRAZOLE 20 MG/1
20 CAPSULE, DELAYED RELEASE ORAL DAILY
Qty: 90 CAPSULE | Refills: 3 | Status: SHIPPED | OUTPATIENT
Start: 2020-04-02 | End: 2021-04-05

## 2020-04-02 RX ORDER — ATORVASTATIN CALCIUM 20 MG/1
20 TABLET, FILM COATED ORAL DAILY
Qty: 90 TABLET | Refills: 3 | Status: SHIPPED | OUTPATIENT
Start: 2020-04-02 | End: 2021-01-05 | Stop reason: HOSPADM

## 2020-04-02 RX ORDER — AMLODIPINE BESYLATE 2.5 MG/1
2.5 TABLET ORAL DAILY
Qty: 60 TABLET | Refills: 3 | Status: SHIPPED | OUTPATIENT
Start: 2020-04-02 | End: 2020-10-28

## 2020-04-02 RX ORDER — CHOLECALCIFEROL (VITAMIN D3) 1250 MCG
1 CAPSULE ORAL WEEKLY
Qty: 12 CAPSULE | Refills: 1 | Status: SHIPPED | OUTPATIENT
Start: 2020-04-02 | End: 2021-01-11

## 2020-08-06 DIAGNOSIS — E55.9 VITAMIN D DEFICIENCY: ICD-10-CM

## 2020-08-06 DIAGNOSIS — E03.9 HYPOTHYROIDISM, UNSPECIFIED TYPE: ICD-10-CM

## 2020-08-06 DIAGNOSIS — E78.2 MIXED HYPERLIPIDEMIA: Primary | ICD-10-CM

## 2020-08-07 ENCOUNTER — APPOINTMENT (OUTPATIENT)
Dept: LAB | Facility: HOSPITAL | Age: 75
End: 2020-08-07
Payer: COMMERCIAL

## 2020-08-07 DIAGNOSIS — E78.2 MIXED HYPERLIPIDEMIA: ICD-10-CM

## 2020-08-07 DIAGNOSIS — E03.9 HYPOTHYROIDISM, UNSPECIFIED TYPE: ICD-10-CM

## 2020-08-07 DIAGNOSIS — E55.9 VITAMIN D DEFICIENCY: ICD-10-CM

## 2020-08-07 LAB
25(OH)D3 SERPL-MCNC: 57.4 NG/ML (ref 30–100)
ALBUMIN SERPL BCP-MCNC: 3.5 G/DL (ref 3.5–5)
ALP SERPL-CCNC: 155 U/L (ref 46–116)
ALT SERPL W P-5'-P-CCNC: 29 U/L (ref 12–78)
ANION GAP SERPL CALCULATED.3IONS-SCNC: 9 MMOL/L (ref 4–13)
AST SERPL W P-5'-P-CCNC: 24 U/L (ref 5–45)
BASOPHILS # BLD AUTO: 0.08 THOUSANDS/ΜL (ref 0–0.1)
BASOPHILS NFR BLD AUTO: 1 % (ref 0–1)
BILIRUB SERPL-MCNC: 0.4 MG/DL (ref 0.2–1)
BUN SERPL-MCNC: 16 MG/DL (ref 5–25)
CALCIUM SERPL-MCNC: 8.7 MG/DL (ref 8.3–10.1)
CHLORIDE SERPL-SCNC: 105 MMOL/L (ref 100–108)
CHOLEST SERPL-MCNC: 184 MG/DL (ref 50–200)
CO2 SERPL-SCNC: 27 MMOL/L (ref 21–32)
CREAT SERPL-MCNC: 1.19 MG/DL (ref 0.6–1.3)
EOSINOPHIL # BLD AUTO: 0.26 THOUSAND/ΜL (ref 0–0.61)
EOSINOPHIL NFR BLD AUTO: 4 % (ref 0–6)
ERYTHROCYTE [DISTWIDTH] IN BLOOD BY AUTOMATED COUNT: 13.6 % (ref 11.6–15.1)
GFR SERPL CREATININE-BSD FRML MDRD: 45 ML/MIN/1.73SQ M
GLUCOSE P FAST SERPL-MCNC: 111 MG/DL (ref 65–99)
HCT VFR BLD AUTO: 39.8 % (ref 34.8–46.1)
HDLC SERPL-MCNC: 61 MG/DL
HGB BLD-MCNC: 12.6 G/DL (ref 11.5–15.4)
IMM GRANULOCYTES # BLD AUTO: 0.02 THOUSAND/UL (ref 0–0.2)
IMM GRANULOCYTES NFR BLD AUTO: 0 % (ref 0–2)
LDLC SERPL CALC-MCNC: 95 MG/DL (ref 0–100)
LYMPHOCYTES # BLD AUTO: 2.58 THOUSANDS/ΜL (ref 0.6–4.47)
LYMPHOCYTES NFR BLD AUTO: 38 % (ref 14–44)
MCH RBC QN AUTO: 28.5 PG (ref 26.8–34.3)
MCHC RBC AUTO-ENTMCNC: 31.7 G/DL (ref 31.4–37.4)
MCV RBC AUTO: 90 FL (ref 82–98)
MONOCYTES # BLD AUTO: 0.51 THOUSAND/ΜL (ref 0.17–1.22)
MONOCYTES NFR BLD AUTO: 7 % (ref 4–12)
NEUTROPHILS # BLD AUTO: 3.4 THOUSANDS/ΜL (ref 1.85–7.62)
NEUTS SEG NFR BLD AUTO: 50 % (ref 43–75)
NONHDLC SERPL-MCNC: 123 MG/DL
NRBC BLD AUTO-RTO: 0 /100 WBCS
PLATELET # BLD AUTO: 289 THOUSANDS/UL (ref 149–390)
PMV BLD AUTO: 11 FL (ref 8.9–12.7)
POTASSIUM SERPL-SCNC: 3.6 MMOL/L (ref 3.5–5.3)
PROT SERPL-MCNC: 7.5 G/DL (ref 6.4–8.2)
RBC # BLD AUTO: 4.42 MILLION/UL (ref 3.81–5.12)
SODIUM SERPL-SCNC: 141 MMOL/L (ref 136–145)
T4 FREE SERPL-MCNC: 0.92 NG/DL (ref 0.76–1.46)
TRIGL SERPL-MCNC: 139 MG/DL
TSH SERPL DL<=0.05 MIU/L-ACNC: 5.3 UIU/ML (ref 0.36–3.74)
WBC # BLD AUTO: 6.85 THOUSAND/UL (ref 4.31–10.16)

## 2020-08-07 PROCEDURE — 82306 VITAMIN D 25 HYDROXY: CPT

## 2020-08-07 PROCEDURE — 85025 COMPLETE CBC W/AUTO DIFF WBC: CPT

## 2020-08-07 PROCEDURE — 80053 COMPREHEN METABOLIC PANEL: CPT

## 2020-08-07 PROCEDURE — 80061 LIPID PANEL: CPT

## 2020-08-07 PROCEDURE — 84439 ASSAY OF FREE THYROXINE: CPT

## 2020-08-07 PROCEDURE — 84443 ASSAY THYROID STIM HORMONE: CPT

## 2020-08-07 PROCEDURE — 36415 COLL VENOUS BLD VENIPUNCTURE: CPT

## 2020-10-07 ENCOUNTER — OFFICE VISIT (OUTPATIENT)
Dept: INTERNAL MEDICINE CLINIC | Facility: CLINIC | Age: 75
End: 2020-10-07
Payer: COMMERCIAL

## 2020-10-07 ENCOUNTER — TELEPHONE (OUTPATIENT)
Dept: PALLIATIVE MEDICINE | Facility: CLINIC | Age: 75
End: 2020-10-07

## 2020-10-07 VITALS
BODY MASS INDEX: 40.46 KG/M2 | TEMPERATURE: 96.8 F | DIASTOLIC BLOOD PRESSURE: 64 MMHG | HEART RATE: 84 BPM | HEIGHT: 64 IN | OXYGEN SATURATION: 98 % | WEIGHT: 237 LBS | SYSTOLIC BLOOD PRESSURE: 132 MMHG

## 2020-10-07 DIAGNOSIS — H93.13 TINNITUS OF BOTH EARS: ICD-10-CM

## 2020-10-07 DIAGNOSIS — Z00.00 MEDICARE ANNUAL WELLNESS VISIT, SUBSEQUENT: Primary | ICD-10-CM

## 2020-10-07 DIAGNOSIS — E66.01 MORBID OBESITY WITH BMI OF 40.0-44.9, ADULT (HCC): ICD-10-CM

## 2020-10-07 DIAGNOSIS — R21 RASH AND NONSPECIFIC SKIN ERUPTION: ICD-10-CM

## 2020-10-07 PROCEDURE — 1125F AMNT PAIN NOTED PAIN PRSNT: CPT | Performed by: NURSE PRACTITIONER

## 2020-10-07 PROCEDURE — 99213 OFFICE O/P EST LOW 20 MIN: CPT | Performed by: NURSE PRACTITIONER

## 2020-10-07 PROCEDURE — 3725F SCREEN DEPRESSION PERFORMED: CPT | Performed by: NURSE PRACTITIONER

## 2020-10-07 PROCEDURE — 1170F FXNL STATUS ASSESSED: CPT | Performed by: NURSE PRACTITIONER

## 2020-10-07 PROCEDURE — G0439 PPPS, SUBSEQ VISIT: HCPCS | Performed by: NURSE PRACTITIONER

## 2020-10-07 RX ORDER — DOXYCYCLINE 100 MG/1
100 CAPSULE ORAL 2 TIMES DAILY
Qty: 20 CAPSULE | Refills: 0 | Status: SHIPPED | OUTPATIENT
Start: 2020-10-07 | End: 2020-10-13 | Stop reason: DRUGHIGH

## 2020-10-07 RX ORDER — IBUPROFEN 200 MG
TABLET ORAL EVERY 6 HOURS PRN
COMMUNITY
End: 2021-01-11 | Stop reason: CLARIF

## 2020-10-07 RX ORDER — METHYLPREDNISOLONE 4 MG/1
TABLET ORAL
Qty: 21 EACH | Refills: 0 | Status: SHIPPED | OUTPATIENT
Start: 2020-10-07 | End: 2020-10-13 | Stop reason: ALTCHOICE

## 2020-10-12 ENCOUNTER — TELEPHONE (OUTPATIENT)
Dept: INTERNAL MEDICINE CLINIC | Facility: CLINIC | Age: 75
End: 2020-10-12

## 2020-10-13 ENCOUNTER — OFFICE VISIT (OUTPATIENT)
Dept: INTERNAL MEDICINE CLINIC | Facility: CLINIC | Age: 75
End: 2020-10-13
Payer: COMMERCIAL

## 2020-10-13 VITALS
HEART RATE: 102 BPM | DIASTOLIC BLOOD PRESSURE: 74 MMHG | BODY MASS INDEX: 40.39 KG/M2 | TEMPERATURE: 96.3 F | WEIGHT: 236.6 LBS | OXYGEN SATURATION: 98 % | SYSTOLIC BLOOD PRESSURE: 126 MMHG | HEIGHT: 64 IN

## 2020-10-13 DIAGNOSIS — B02.9 HERPES ZOSTER WITHOUT COMPLICATION: Primary | ICD-10-CM

## 2020-10-13 PROCEDURE — 3078F DIAST BP <80 MM HG: CPT | Performed by: NURSE PRACTITIONER

## 2020-10-13 PROCEDURE — 99213 OFFICE O/P EST LOW 20 MIN: CPT | Performed by: NURSE PRACTITIONER

## 2020-10-13 RX ORDER — VALACYCLOVIR HYDROCHLORIDE 1 G/1
1000 TABLET, FILM COATED ORAL 3 TIMES DAILY
Qty: 21 TABLET | Refills: 0 | Status: SHIPPED | OUTPATIENT
Start: 2020-10-13 | End: 2020-10-22

## 2020-10-13 RX ORDER — ACYCLOVIR 50 MG/G
OINTMENT TOPICAL
Qty: 15 G | Refills: 0 | Status: SHIPPED | OUTPATIENT
Start: 2020-10-13 | End: 2020-10-22 | Stop reason: ALTCHOICE

## 2020-10-15 ENCOUNTER — TELEPHONE (OUTPATIENT)
Dept: OTOLARYNGOLOGY | Facility: CLINIC | Age: 75
End: 2020-10-15

## 2020-10-21 ENCOUNTER — TELEPHONE (OUTPATIENT)
Dept: INTERNAL MEDICINE CLINIC | Facility: CLINIC | Age: 75
End: 2020-10-21

## 2020-10-22 ENCOUNTER — OFFICE VISIT (OUTPATIENT)
Dept: INTERNAL MEDICINE CLINIC | Facility: CLINIC | Age: 75
End: 2020-10-22
Payer: COMMERCIAL

## 2020-10-22 VITALS
BODY MASS INDEX: 40.2 KG/M2 | WEIGHT: 235.5 LBS | HEIGHT: 64 IN | TEMPERATURE: 97.7 F | OXYGEN SATURATION: 98 % | HEART RATE: 80 BPM

## 2020-10-22 DIAGNOSIS — B02.9 HERPES ZOSTER WITHOUT COMPLICATION: Primary | ICD-10-CM

## 2020-10-22 PROCEDURE — 1036F TOBACCO NON-USER: CPT | Performed by: NURSE PRACTITIONER

## 2020-10-22 PROCEDURE — 1160F RVW MEDS BY RX/DR IN RCRD: CPT | Performed by: NURSE PRACTITIONER

## 2020-10-22 PROCEDURE — 99213 OFFICE O/P EST LOW 20 MIN: CPT | Performed by: NURSE PRACTITIONER

## 2020-10-22 RX ORDER — GABAPENTIN 100 MG/1
100 CAPSULE ORAL 2 TIMES DAILY
Qty: 14 CAPSULE | Refills: 0 | Status: SHIPPED | OUTPATIENT
Start: 2020-10-22 | End: 2021-01-11

## 2020-10-27 DIAGNOSIS — I10 ESSENTIAL HYPERTENSION: ICD-10-CM

## 2020-10-28 RX ORDER — AMLODIPINE BESYLATE 2.5 MG/1
TABLET ORAL
Qty: 90 TABLET | Refills: 0 | Status: SHIPPED | OUTPATIENT
Start: 2020-10-28 | End: 2021-01-11 | Stop reason: SDUPTHER

## 2020-11-09 ENCOUNTER — TELEPHONE (OUTPATIENT)
Dept: INTERNAL MEDICINE CLINIC | Facility: CLINIC | Age: 75
End: 2020-11-09

## 2020-11-10 DIAGNOSIS — E03.9 HYPOTHYROIDISM, UNSPECIFIED TYPE: ICD-10-CM

## 2020-11-10 RX ORDER — LEVOTHYROXINE SODIUM 0.07 MG/1
75 TABLET ORAL DAILY
Qty: 14 TABLET | Refills: 0 | Status: SHIPPED | OUTPATIENT
Start: 2020-11-10 | End: 2020-11-24

## 2020-11-24 ENCOUNTER — LAB (OUTPATIENT)
Dept: LAB | Facility: HOSPITAL | Age: 75
End: 2020-11-24
Payer: COMMERCIAL

## 2020-11-24 DIAGNOSIS — R79.89 ABNORMAL TSH: ICD-10-CM

## 2020-11-24 DIAGNOSIS — E03.9 HYPOTHYROIDISM, UNSPECIFIED TYPE: Primary | ICD-10-CM

## 2020-11-24 DIAGNOSIS — R79.89 ABNORMAL TSH: Primary | ICD-10-CM

## 2020-11-24 LAB
T4 FREE SERPL-MCNC: 0.93 NG/DL (ref 0.76–1.46)
TSH SERPL DL<=0.05 MIU/L-ACNC: 5.11 UIU/ML (ref 0.36–3.74)

## 2020-11-24 PROCEDURE — 36415 COLL VENOUS BLD VENIPUNCTURE: CPT

## 2020-11-24 PROCEDURE — 84439 ASSAY OF FREE THYROXINE: CPT

## 2020-11-24 PROCEDURE — 84443 ASSAY THYROID STIM HORMONE: CPT

## 2020-11-24 RX ORDER — LEVOTHYROXINE SODIUM 0.1 MG/1
100 TABLET ORAL DAILY
Qty: 30 TABLET | Refills: 3 | Status: SHIPPED | OUTPATIENT
Start: 2020-11-24 | End: 2021-01-20 | Stop reason: DRUGHIGH

## 2020-12-04 ENCOUNTER — OFFICE VISIT (OUTPATIENT)
Dept: INTERNAL MEDICINE CLINIC | Facility: CLINIC | Age: 75
End: 2020-12-04
Payer: COMMERCIAL

## 2020-12-04 VITALS
SYSTOLIC BLOOD PRESSURE: 140 MMHG | WEIGHT: 242.1 LBS | DIASTOLIC BLOOD PRESSURE: 70 MMHG | HEART RATE: 87 BPM | OXYGEN SATURATION: 98 % | BODY MASS INDEX: 41.33 KG/M2 | HEIGHT: 64 IN | TEMPERATURE: 96.6 F

## 2020-12-04 DIAGNOSIS — E03.9 HYPOTHYROIDISM, UNSPECIFIED TYPE: Primary | ICD-10-CM

## 2020-12-04 DIAGNOSIS — G47.33 OBSTRUCTIVE SLEEP APNEA: ICD-10-CM

## 2020-12-04 DIAGNOSIS — Z23 NEED FOR INFLUENZA VACCINATION: ICD-10-CM

## 2020-12-04 PROBLEM — Z12.11 SCREENING FOR COLON CANCER: Status: RESOLVED | Noted: 2019-10-01 | Resolved: 2020-12-04

## 2020-12-04 PROBLEM — Z00.00 MEDICARE ANNUAL WELLNESS VISIT, SUBSEQUENT: Status: RESOLVED | Noted: 2019-10-01 | Resolved: 2020-12-04

## 2020-12-04 PROBLEM — R21 RASH AND NONSPECIFIC SKIN ERUPTION: Status: RESOLVED | Noted: 2020-10-07 | Resolved: 2020-12-04

## 2020-12-04 PROBLEM — Z12.39 SCREENING FOR BREAST CANCER: Status: RESOLVED | Noted: 2019-10-01 | Resolved: 2020-12-04

## 2020-12-04 PROCEDURE — 3078F DIAST BP <80 MM HG: CPT | Performed by: NURSE PRACTITIONER

## 2020-12-04 PROCEDURE — G0008 ADMIN INFLUENZA VIRUS VAC: HCPCS | Performed by: NURSE PRACTITIONER

## 2020-12-04 PROCEDURE — 90662 IIV NO PRSV INCREASED AG IM: CPT | Performed by: NURSE PRACTITIONER

## 2020-12-04 PROCEDURE — 1036F TOBACCO NON-USER: CPT | Performed by: NURSE PRACTITIONER

## 2020-12-04 PROCEDURE — 99213 OFFICE O/P EST LOW 20 MIN: CPT | Performed by: NURSE PRACTITIONER

## 2020-12-04 PROCEDURE — 3077F SYST BP >= 140 MM HG: CPT | Performed by: NURSE PRACTITIONER

## 2020-12-31 ENCOUNTER — APPOINTMENT (EMERGENCY)
Dept: CT IMAGING | Facility: HOSPITAL | Age: 75
DRG: 177 | End: 2020-12-31
Payer: COMMERCIAL

## 2020-12-31 ENCOUNTER — TELEMEDICINE (OUTPATIENT)
Dept: INTERNAL MEDICINE CLINIC | Facility: CLINIC | Age: 75
End: 2020-12-31
Payer: COMMERCIAL

## 2020-12-31 ENCOUNTER — HOSPITAL ENCOUNTER (INPATIENT)
Facility: HOSPITAL | Age: 75
LOS: 5 days | Discharge: HOME HEALTH | DRG: 177 | End: 2021-01-05
Attending: EMERGENCY MEDICINE | Admitting: INTERNAL MEDICINE
Payer: COMMERCIAL

## 2020-12-31 VITALS — TEMPERATURE: 97.1 F | HEART RATE: 110 BPM | OXYGEN SATURATION: 84 %

## 2020-12-31 DIAGNOSIS — Z20.822 EXPOSURE TO COVID-19 VIRUS: Primary | ICD-10-CM

## 2020-12-31 DIAGNOSIS — R09.02 HYPOXIA: ICD-10-CM

## 2020-12-31 DIAGNOSIS — J98.4 COVID-19 WITH PULMONARY COMORBIDITY: Primary | ICD-10-CM

## 2020-12-31 DIAGNOSIS — R59.1 LYMPHADENOPATHY: ICD-10-CM

## 2020-12-31 DIAGNOSIS — U07.1 COVID-19 WITH PULMONARY COMORBIDITY: Primary | ICD-10-CM

## 2020-12-31 DIAGNOSIS — U07.1 PNEUMONIA DUE TO COVID-19 VIRUS: ICD-10-CM

## 2020-12-31 DIAGNOSIS — I26.99 BILATERAL PULMONARY EMBOLISM (HCC): ICD-10-CM

## 2020-12-31 DIAGNOSIS — J12.82 PNEUMONIA DUE TO COVID-19 VIRUS: ICD-10-CM

## 2020-12-31 DIAGNOSIS — G47.33 OBSTRUCTIVE SLEEP APNEA: ICD-10-CM

## 2020-12-31 PROBLEM — E03.9 ACQUIRED HYPOTHYROIDISM: Status: ACTIVE | Noted: 2020-12-31

## 2020-12-31 PROBLEM — E87.6 HYPOKALEMIA: Status: ACTIVE | Noted: 2020-12-31

## 2020-12-31 PROBLEM — R74.01 TRANSAMINITIS: Status: ACTIVE | Noted: 2020-12-31

## 2020-12-31 PROBLEM — E78.00 HYPERCHOLESTEROLEMIA: Status: ACTIVE | Noted: 2020-12-31

## 2020-12-31 LAB
ALBUMIN SERPL BCP-MCNC: 3.3 G/DL (ref 3.5–5)
ALP SERPL-CCNC: 208 U/L (ref 46–116)
ALT SERPL W P-5'-P-CCNC: 46 U/L (ref 12–78)
ANION GAP SERPL CALCULATED.3IONS-SCNC: 9 MMOL/L (ref 4–13)
APTT PPP: 33 SECONDS (ref 23–37)
APTT PPP: >210 SECONDS (ref 23–37)
AST SERPL W P-5'-P-CCNC: 55 U/L (ref 5–45)
BASE EX.OXY STD BLDV CALC-SCNC: 56.1 % (ref 60–80)
BASE EXCESS BLDV CALC-SCNC: -2.6 MMOL/L
BASOPHILS # BLD AUTO: 0.03 THOUSANDS/ΜL (ref 0–0.1)
BASOPHILS NFR BLD AUTO: 1 % (ref 0–1)
BILIRUB SERPL-MCNC: 0.5 MG/DL (ref 0.2–1)
BUN SERPL-MCNC: 19 MG/DL (ref 5–25)
CALCIUM ALBUM COR SERPL-MCNC: 8.8 MG/DL (ref 8.3–10.1)
CALCIUM SERPL-MCNC: 8.2 MG/DL (ref 8.3–10.1)
CHLORIDE SERPL-SCNC: 101 MMOL/L (ref 100–108)
CO2 SERPL-SCNC: 27 MMOL/L (ref 21–32)
CREAT SERPL-MCNC: 1.19 MG/DL (ref 0.6–1.3)
D DIMER PPP FEU-MCNC: 3.13 UG/ML FEU
EOSINOPHIL # BLD AUTO: 0.01 THOUSAND/ΜL (ref 0–0.61)
EOSINOPHIL NFR BLD AUTO: 0 % (ref 0–6)
ERYTHROCYTE [DISTWIDTH] IN BLOOD BY AUTOMATED COUNT: 14.1 % (ref 11.6–15.1)
FLUAV RNA RESP QL NAA+PROBE: NEGATIVE
FLUBV RNA RESP QL NAA+PROBE: NEGATIVE
GFR SERPL CREATININE-BSD FRML MDRD: 44 ML/MIN/1.73SQ M
GLUCOSE SERPL-MCNC: 109 MG/DL (ref 65–140)
HCO3 BLDV-SCNC: 23.3 MMOL/L (ref 24–30)
HCT VFR BLD AUTO: 39.8 % (ref 34.8–46.1)
HGB BLD-MCNC: 12.6 G/DL (ref 11.5–15.4)
IMM GRANULOCYTES # BLD AUTO: 0.02 THOUSAND/UL (ref 0–0.2)
IMM GRANULOCYTES NFR BLD AUTO: 0 % (ref 0–2)
LYMPHOCYTES # BLD AUTO: 0.98 THOUSANDS/ΜL (ref 0.6–4.47)
LYMPHOCYTES NFR BLD AUTO: 19 % (ref 14–44)
MCH RBC QN AUTO: 28.8 PG (ref 26.8–34.3)
MCHC RBC AUTO-ENTMCNC: 31.7 G/DL (ref 31.4–37.4)
MCV RBC AUTO: 91 FL (ref 82–98)
MONOCYTES # BLD AUTO: 0.25 THOUSAND/ΜL (ref 0.17–1.22)
MONOCYTES NFR BLD AUTO: 5 % (ref 4–12)
NEUTROPHILS # BLD AUTO: 3.94 THOUSANDS/ΜL (ref 1.85–7.62)
NEUTS SEG NFR BLD AUTO: 75 % (ref 43–75)
NRBC BLD AUTO-RTO: 0 /100 WBCS
NT-PROBNP SERPL-MCNC: 250 PG/ML
O2 CT BLDV-SCNC: 10.3 ML/DL
PCO2 BLDV: 44.4 MM HG (ref 42–50)
PH BLDV: 7.34 [PH] (ref 7.3–7.4)
PLATELET # BLD AUTO: 201 THOUSANDS/UL (ref 149–390)
PMV BLD AUTO: 10.4 FL (ref 8.9–12.7)
PO2 BLDV: 30.5 MM HG (ref 35–45)
POTASSIUM SERPL-SCNC: 3.3 MMOL/L (ref 3.5–5.3)
PROCALCITONIN SERPL-MCNC: 0.09 NG/ML
PROT SERPL-MCNC: 7.3 G/DL (ref 6.4–8.2)
RBC # BLD AUTO: 4.38 MILLION/UL (ref 3.81–5.12)
RSV RNA RESP QL NAA+PROBE: NEGATIVE
SARS-COV-2 RNA RESP QL NAA+PROBE: POSITIVE
SODIUM SERPL-SCNC: 137 MMOL/L (ref 136–145)
WBC # BLD AUTO: 5.23 THOUSAND/UL (ref 4.31–10.16)

## 2020-12-31 PROCEDURE — XW033E5 INTRODUCTION OF REMDESIVIR ANTI-INFECTIVE INTO PERIPHERAL VEIN, PERCUTANEOUS APPROACH, NEW TECHNOLOGY GROUP 5: ICD-10-PCS | Performed by: INTERNAL MEDICINE

## 2020-12-31 PROCEDURE — 0241U HB NFCT DS VIR RESP RNA 4 TRGT: CPT | Performed by: EMERGENCY MEDICINE

## 2020-12-31 PROCEDURE — G1004 CDSM NDSC: HCPCS

## 2020-12-31 PROCEDURE — 80053 COMPREHEN METABOLIC PANEL: CPT | Performed by: EMERGENCY MEDICINE

## 2020-12-31 PROCEDURE — 94760 N-INVAS EAR/PLS OXIMETRY 1: CPT

## 2020-12-31 PROCEDURE — 93005 ELECTROCARDIOGRAM TRACING: CPT

## 2020-12-31 PROCEDURE — 85379 FIBRIN DEGRADATION QUANT: CPT | Performed by: EMERGENCY MEDICINE

## 2020-12-31 PROCEDURE — 84145 PROCALCITONIN (PCT): CPT | Performed by: EMERGENCY MEDICINE

## 2020-12-31 PROCEDURE — 36415 COLL VENOUS BLD VENIPUNCTURE: CPT | Performed by: EMERGENCY MEDICINE

## 2020-12-31 PROCEDURE — 71275 CT ANGIOGRAPHY CHEST: CPT

## 2020-12-31 PROCEDURE — 82805 BLOOD GASES W/O2 SATURATION: CPT | Performed by: EMERGENCY MEDICINE

## 2020-12-31 PROCEDURE — 85730 THROMBOPLASTIN TIME PARTIAL: CPT | Performed by: EMERGENCY MEDICINE

## 2020-12-31 PROCEDURE — 85025 COMPLETE CBC W/AUTO DIFF WBC: CPT | Performed by: EMERGENCY MEDICINE

## 2020-12-31 PROCEDURE — 99223 1ST HOSP IP/OBS HIGH 75: CPT | Performed by: INTERNAL MEDICINE

## 2020-12-31 PROCEDURE — 83880 ASSAY OF NATRIURETIC PEPTIDE: CPT | Performed by: EMERGENCY MEDICINE

## 2020-12-31 PROCEDURE — 85730 THROMBOPLASTIN TIME PARTIAL: CPT | Performed by: INTERNAL MEDICINE

## 2020-12-31 PROCEDURE — U0003 INFECTIOUS AGENT DETECTION BY NUCLEIC ACID (DNA OR RNA); SEVERE ACUTE RESPIRATORY SYNDROME CORONAVIRUS 2 (SARS-COV-2) (CORONAVIRUS DISEASE [COVID-19]), AMPLIFIED PROBE TECHNIQUE, MAKING USE OF HIGH THROUGHPUT TECHNOLOGIES AS DESCRIBED BY CMS-2020-01-R: HCPCS | Performed by: NURSE PRACTITIONER

## 2020-12-31 PROCEDURE — 99442 PR PHYS/QHP TELEPHONE EVALUATION 11-20 MIN: CPT | Performed by: NURSE PRACTITIONER

## 2020-12-31 PROCEDURE — 94664 DEMO&/EVAL PT USE INHALER: CPT

## 2020-12-31 PROCEDURE — 99291 CRITICAL CARE FIRST HOUR: CPT | Performed by: EMERGENCY MEDICINE

## 2020-12-31 PROCEDURE — 99285 EMERGENCY DEPT VISIT HI MDM: CPT

## 2020-12-31 RX ORDER — HEPARIN SODIUM 1000 [USP'U]/ML
8800 INJECTION, SOLUTION INTRAVENOUS; SUBCUTANEOUS
Status: DISCONTINUED | OUTPATIENT
Start: 2020-12-31 | End: 2021-01-02

## 2020-12-31 RX ORDER — LOSARTAN POTASSIUM 25 MG/1
25 TABLET ORAL DAILY
Status: DISCONTINUED | OUTPATIENT
Start: 2021-01-01 | End: 2021-01-05 | Stop reason: HOSPADM

## 2020-12-31 RX ORDER — LORATADINE 10 MG/1
10 TABLET ORAL DAILY
Status: DISCONTINUED | OUTPATIENT
Start: 2021-01-01 | End: 2021-01-05 | Stop reason: HOSPADM

## 2020-12-31 RX ORDER — GABAPENTIN 100 MG/1
100 CAPSULE ORAL 2 TIMES DAILY
Status: DISCONTINUED | OUTPATIENT
Start: 2020-12-31 | End: 2021-01-05 | Stop reason: HOSPADM

## 2020-12-31 RX ORDER — ZINC SULFATE 50(220)MG
220 CAPSULE ORAL DAILY
Status: DISCONTINUED | OUTPATIENT
Start: 2021-01-01 | End: 2021-01-05 | Stop reason: HOSPADM

## 2020-12-31 RX ORDER — MELATONIN
2000 DAILY
Status: DISCONTINUED | OUTPATIENT
Start: 2021-01-01 | End: 2021-01-05 | Stop reason: HOSPADM

## 2020-12-31 RX ORDER — DEXAMETHASONE SODIUM PHOSPHATE 4 MG/ML
6 INJECTION, SOLUTION INTRA-ARTICULAR; INTRALESIONAL; INTRAMUSCULAR; INTRAVENOUS; SOFT TISSUE EVERY 24 HOURS
Status: DISCONTINUED | OUTPATIENT
Start: 2020-12-31 | End: 2021-01-05 | Stop reason: HOSPADM

## 2020-12-31 RX ORDER — POTASSIUM CHLORIDE 20 MEQ/1
40 TABLET, EXTENDED RELEASE ORAL ONCE
Status: COMPLETED | OUTPATIENT
Start: 2020-12-31 | End: 2020-12-31

## 2020-12-31 RX ORDER — ASCORBIC ACID 500 MG
1000 TABLET ORAL EVERY 12 HOURS SCHEDULED
Status: DISCONTINUED | OUTPATIENT
Start: 2020-12-31 | End: 2021-01-05 | Stop reason: HOSPADM

## 2020-12-31 RX ORDER — MONTELUKAST SODIUM 10 MG/1
10 TABLET ORAL DAILY
Status: DISCONTINUED | OUTPATIENT
Start: 2021-01-01 | End: 2021-01-05 | Stop reason: HOSPADM

## 2020-12-31 RX ORDER — CEFTRIAXONE 1 G/50ML
1000 INJECTION, SOLUTION INTRAVENOUS EVERY 24 HOURS
Status: DISCONTINUED | OUTPATIENT
Start: 2020-12-31 | End: 2021-01-03

## 2020-12-31 RX ORDER — HEPARIN SODIUM 10000 [USP'U]/100ML
3-30 INJECTION, SOLUTION INTRAVENOUS
Status: DISCONTINUED | OUTPATIENT
Start: 2020-12-31 | End: 2021-01-02

## 2020-12-31 RX ORDER — AMLODIPINE BESYLATE 2.5 MG/1
2.5 TABLET ORAL DAILY
Status: DISCONTINUED | OUTPATIENT
Start: 2021-01-01 | End: 2021-01-05 | Stop reason: HOSPADM

## 2020-12-31 RX ORDER — ATORVASTATIN CALCIUM 40 MG/1
40 TABLET, FILM COATED ORAL
Status: DISCONTINUED | OUTPATIENT
Start: 2021-01-01 | End: 2021-01-01

## 2020-12-31 RX ORDER — HEPARIN SODIUM 1000 [USP'U]/ML
4400 INJECTION, SOLUTION INTRAVENOUS; SUBCUTANEOUS
Status: DISCONTINUED | OUTPATIENT
Start: 2020-12-31 | End: 2021-01-02

## 2020-12-31 RX ORDER — FAMOTIDINE 20 MG/1
20 TABLET, FILM COATED ORAL DAILY
Status: DISCONTINUED | OUTPATIENT
Start: 2021-01-01 | End: 2021-01-05 | Stop reason: HOSPADM

## 2020-12-31 RX ORDER — MULTIVITAMIN/IRON/FOLIC ACID 18MG-0.4MG
1 TABLET ORAL DAILY
Status: DISCONTINUED | OUTPATIENT
Start: 2021-01-08 | End: 2021-01-05 | Stop reason: HOSPADM

## 2020-12-31 RX ORDER — ALBUTEROL SULFATE 90 UG/1
2 AEROSOL, METERED RESPIRATORY (INHALATION) 4 TIMES DAILY
Status: DISCONTINUED | OUTPATIENT
Start: 2020-12-31 | End: 2021-01-02

## 2020-12-31 RX ORDER — HEPARIN SODIUM 1000 [USP'U]/ML
8800 INJECTION, SOLUTION INTRAVENOUS; SUBCUTANEOUS ONCE
Status: COMPLETED | OUTPATIENT
Start: 2020-12-31 | End: 2020-12-31

## 2020-12-31 RX ORDER — OXYCODONE HYDROCHLORIDE 5 MG/1
2.5 TABLET ORAL ONCE
Status: COMPLETED | OUTPATIENT
Start: 2020-12-31 | End: 2020-12-31

## 2020-12-31 RX ORDER — LEVOTHYROXINE SODIUM 0.1 MG/1
100 TABLET ORAL
Status: DISCONTINUED | OUTPATIENT
Start: 2021-01-01 | End: 2021-01-05 | Stop reason: HOSPADM

## 2020-12-31 RX ADMIN — ALBUTEROL SULFATE 2 PUFF: 90 AEROSOL, METERED RESPIRATORY (INHALATION) at 20:32

## 2020-12-31 RX ADMIN — OXYCODONE HYDROCHLORIDE AND ACETAMINOPHEN 1000 MG: 500 TABLET ORAL at 18:27

## 2020-12-31 RX ADMIN — ALBUTEROL SULFATE 2 PUFF: 90 AEROSOL, METERED RESPIRATORY (INHALATION) at 22:55

## 2020-12-31 RX ADMIN — HEPARIN SODIUM 8800 UNITS: 1000 INJECTION INTRAVENOUS; SUBCUTANEOUS at 16:30

## 2020-12-31 RX ADMIN — DEXAMETHASONE SODIUM PHOSPHATE 6 MG: 4 INJECTION, SOLUTION INTRA-ARTICULAR; INTRALESIONAL; INTRAMUSCULAR; INTRAVENOUS; SOFT TISSUE at 18:28

## 2020-12-31 RX ADMIN — DOXYCYCLINE 100 MG: 100 INJECTION, POWDER, LYOPHILIZED, FOR SOLUTION INTRAVENOUS at 22:54

## 2020-12-31 RX ADMIN — IOHEXOL 85 ML: 350 INJECTION, SOLUTION INTRAVENOUS at 15:22

## 2020-12-31 RX ADMIN — OXYCODONE HYDROCHLORIDE 2.5 MG: 5 TABLET ORAL at 21:36

## 2020-12-31 RX ADMIN — HEPARIN SODIUM 18 UNITS/KG/HR: 10000 INJECTION, SOLUTION INTRAVENOUS at 16:30

## 2020-12-31 RX ADMIN — POTASSIUM CHLORIDE 40 MEQ: 1500 TABLET, EXTENDED RELEASE ORAL at 18:28

## 2020-12-31 RX ADMIN — CEFTRIAXONE 1000 MG: 1 INJECTION, SOLUTION INTRAVENOUS at 21:39

## 2020-12-31 RX ADMIN — GABAPENTIN 100 MG: 100 CAPSULE ORAL at 18:28

## 2020-12-31 RX ADMIN — REMDESIVIR 200 MG: 100 INJECTION, POWDER, LYOPHILIZED, FOR SOLUTION INTRAVENOUS at 19:00

## 2020-12-31 NOTE — PLAN OF CARE
Problem: Potential for Falls  Goal: Patient will remain free of falls  Description: INTERVENTIONS:  - Assess patient frequently for physical needs  -  Identify cognitive and physical deficits and behaviors that affect risk of falls    -  [place] fall precautions as indicated by assessment   - Educate patient/family on patient safety including physical limitations  - Instruct patient to call for assistance with activity based on assessment  - Modify environment to reduce risk of injury  - Consider OT/PT consult to assist with strengthening/mobility  Outcome: Progressing     Problem: PAIN - ADULT  Goal: Verbalizes/displays adequate comfort level or baseline comfort level  Description: Interventions:  - Encourage patient to monitor pain and request assistance  - Assess pain using appropriate pain scale  - Administer analgesics based on type and severity of pain and evaluate response  - Implement non-pharmacological measures as appropriate and evaluate response  - Consider cultural and social influences on pain and pain management  - Notify physician/advanced practitioner if interventions unsuccessful or patient reports new pain  Outcome: Progressing     Problem: INFECTION - ADULT  Goal: Absence or prevention of progression during hospitalization  Description: INTERVENTIONS:  - Assess and monitor for signs and symptoms of infection  - Monitor lab/diagnostic results  - Monitor all insertion sites, i e  indwelling lines, tubes, and drains  - Monitor endotracheal if appropriate and nasal secretions for changes in amount and color  - [place] appropriate cooling/warming therapies per order  - Administer medications as ordered  - Instruct and encourage patient and family to use good hand hygiene technique  - Identify and instruct in appropriate isolation precautions for identified infection/condition  Outcome: Progressing  Goal: Absence of fever/infection during neutropenic period  Description: INTERVENTIONS:  - Monitor WBC    Outcome: Progressing     Problem: SAFETY ADULT  Goal: Patient will remain free of falls  Description: INTERVENTIONS:  - Assess patient frequently for physical needs  -  Identify cognitive and physical deficits and behaviors that affect risk of falls    -  [place] fall precautions as indicated by assessment   - Educate patient/family on patient safety including physical limitations  - Instruct patient to call for assistance with activity based on assessment  - Modify environment to reduce risk of injury  - Consider OT/PT consult to assist with strengthening/mobility  Outcome: Progressing  Goal: Maintain or return to baseline ADL function  Description: INTERVENTIONS:  -  Assess patient's ability to carry out ADLs; assess patient's baseline for ADL function and identify physical deficits which impact ability to perform ADLs (bathing, care of mouth/teeth, toileting, grooming, dressing, etc )  - Assess/evaluate cause of self-care deficits   - Assess range of motion  - Assess patient's mobility; develop plan if impaired  - Assess patient's need for assistive devices and provide as appropriate  - Encourage maximum independence but intervene and supervise when necessary  - Involve family in performance of ADLs  - Assess for home care needs following discharge   - Consider OT consult to assist with ADL evaluation and planning for discharge  - Provide patient education as appropriate  Outcome: Progressing  Goal: Maintain or return mobility status to optimal level  Description: INTERVENTIONS:  - Assess patient's baseline mobility status (ambulation, transfers, stairs, etc )    - Identify cognitive and physical deficits and behaviors that affect mobility  - Identify mobility aids required to assist with transfers and/or ambulation (gait belt, sit-to-stand, lift, walker, cane, etc )  - [place] fall precautions as indicated by assessment  - Record patient progress and toleration of activity level on Mobility SBAR; progress patient to next Phase/Stage  - Instruct patient to call for assistance with activity based on assessment  - Consider rehabilitation consult to assist with strengthening/weightbearing, etc   Outcome: Progressing     Problem: DISCHARGE PLANNING  Goal: Discharge to home or other facility with appropriate resources  Description: INTERVENTIONS:  - Identify barriers to discharge w/patient and caregiver  - Arrange for needed discharge resources and transportation as appropriate  - Identify discharge learning needs (meds, wound care, etc )  - Arrange for interpretive services to assist at discharge as needed  - Refer to Case Management Department for coordinating discharge planning if the patient needs post-hospital services based on physician/advanced practitioner order or complex needs related to functional status, cognitive ability, or social support system  Outcome: Progressing     Problem: Knowledge Deficit  Goal: Patient/family/caregiver demonstrates understanding of disease process, treatment plan, medications, and discharge instructions  Description: Complete learning assessment and assess knowledge base    Interventions:  - Provide teaching at level of understanding  - Provide teaching via preferred learning methods  Outcome: Progressing

## 2020-12-31 NOTE — ED PROVIDER NOTES
History  Chief Complaint   Patient presents with    Shortness of Breath     Since christmas has cough, congestion, shortness of breath, n/v/d, fever and chills     Patient presents from her PCP office for evaluation of cough, congestion and dyspnea for the past 6 days  She had 1 episode of nausea/vomiting and diarrhea last night  In the PCP office, her SpO2 was 84 percent on room air  She has no prior h/o supplemental oxygen requirement  She does have a h/o chronic allergies and thought some of the symptoms were attributable to that  She has dyspnea on exertion and feels lightheaded when that happens  No recent travel or known COVID positive contacts  She has not been tested for COVID  Denies f/c, HA, CP, abdominal pain, dysuria  12 system ROS o/w negative  History provided by:  Patient and medical records  Shortness of Breath  Severity:  Moderate  Onset quality:  Gradual  Duration:  6 days  Timing:  Constant  Progression:  Worsening  Chronicity:  New  Context: activity    Relieved by:  Rest  Worsened by: Activity  Ineffective treatments:  None tried  Associated symptoms: no abdominal pain, no chest pain, no claudication, no cough, no diaphoresis, no fever, no headaches, no neck pain, no rash, no sore throat, no sputum production, no syncope, no vomiting and no wheezing    Risk factors: no hx of cancer and no hx of PE/DVT        Prior to Admission Medications   Prescriptions Last Dose Informant Patient Reported? Taking?    Cholecalciferol (VITAMIN D3) 1 25 MG (01688 UT) CAPS  Self No No   Sig: Take 1 capsule (50,000 Units total) by mouth once a week   amLODIPine (NORVASC) 2 5 mg tablet   No No   Sig: TAKE 1 TABLET EVERY DAY   atorvastatin (LIPITOR) 20 mg tablet  Self No No   Sig: Take 1 tablet (20 mg total) by mouth daily   cetirizine (ZyrTEC) 10 mg tablet  Self No No   Sig: Take 1 tablet (10 mg total) by mouth daily   diclofenac sodium (VOLTAREN) 50 mg EC tablet  Self No No   Sig: Take 1 tablet (50 mg total) by mouth every 12 (twelve) hours as needed (for pain)   fluticasone (FLONASE) 50 mcg/act nasal spray  Self Yes No   Si spray into each nostril daily   gabapentin (NEURONTIN) 100 mg capsule   No No   Sig: Take 1 capsule (100 mg total) by mouth 2 (two) times a day   ibuprofen (Advil) 200 mg tablet  Self Yes No   Sig: Take by mouth every 6 (six) hours as needed for mild pain   levothyroxine 100 mcg tablet   No No   Sig: Take 1 tablet (100 mcg total) by mouth daily   losartan (COZAAR) 50 mg tablet  Self No No   Sig: Take 0 5 tablets (25 mg total) by mouth daily   montelukast (SINGULAIR) 10 mg tablet  Self Yes No   Sig: Take 10 mg by mouth daily   omeprazole (PriLOSEC) 20 mg delayed release capsule  Self No No   Sig: Take 1 capsule (20 mg total) by mouth daily      Facility-Administered Medications: None       Past Medical History:   Diagnosis Date    Allergic     Hypertension     last assessed 5/4/15     Hypothyroid        Past Surgical History:   Procedure Laterality Date    DILATION AND CURETTAGE OF UTERUS      GALLBLADDER SURGERY         Family History   Problem Relation Age of Onset    Emphysema Mother     Emphysema Father      I have reviewed and agree with the history as documented  E-Cigarette/Vaping    E-Cigarette Use Never User      E-Cigarette/Vaping Substances     Social History     Tobacco Use    Smoking status: Never Smoker    Smokeless tobacco: Never Used   Substance Use Topics    Alcohol use: No    Drug use: No       Review of Systems   Constitutional: Negative for chills, diaphoresis and fever  HENT: Positive for congestion and rhinorrhea  Negative for sore throat and trouble swallowing  Respiratory: Positive for shortness of breath  Negative for cough, sputum production, chest tightness and wheezing  Cardiovascular: Negative for chest pain, palpitations, claudication, leg swelling and syncope     Gastrointestinal: Negative for abdominal distention, abdominal pain, constipation, diarrhea, nausea and vomiting  Genitourinary: Negative for difficulty urinating, dysuria, flank pain, frequency, hematuria and urgency  Musculoskeletal: Negative for arthralgias, back pain, myalgias, neck pain and neck stiffness  Skin: Negative for pallor and rash  Neurological: Negative for dizziness, weakness, light-headedness and headaches  Hematological: Negative for adenopathy  Psychiatric/Behavioral: Negative for confusion  The patient is not nervous/anxious  All other systems reviewed and are negative  Physical Exam  Physical Exam  Vitals signs reviewed  Constitutional:       General: She is not in acute distress  Appearance: She is well-developed  She is not ill-appearing, toxic-appearing or diaphoretic  HENT:      Head: Normocephalic  Mouth/Throat:      Pharynx: No oropharyngeal exudate  Eyes:      General: No scleral icterus  Conjunctiva/sclera: Conjunctivae normal       Pupils: Pupils are equal, round, and reactive to light  Neck:      Musculoskeletal: Normal range of motion and neck supple  Cardiovascular:      Rate and Rhythm: Normal rate and regular rhythm  Heart sounds: No murmur  Pulmonary:      Effort: Pulmonary effort is normal       Breath sounds: Normal breath sounds  Chest:      Chest wall: No tenderness  Abdominal:      General: Bowel sounds are normal  There is no distension  Palpations: Abdomen is soft  Tenderness: There is no abdominal tenderness  Musculoskeletal: Normal range of motion  General: No tenderness  Right lower leg: No edema  Left lower leg: No edema  Lymphadenopathy:      Cervical: No cervical adenopathy  Skin:     General: Skin is warm and dry  Capillary Refill: Capillary refill takes less than 2 seconds  Coloration: Skin is not cyanotic or pale  Findings: No erythema or rash  Neurological:      General: No focal deficit present        Mental Status: She is alert and oriented to person, place, and time  Cranial Nerves: No cranial nerve deficit  Motor: No abnormal muscle tone  Deep Tendon Reflexes: Reflexes are normal and symmetric  Psychiatric:         Mood and Affect: Mood normal          Behavior: Behavior normal          Thought Content: Thought content normal          Vital Signs  ED Triage Vitals [12/31/20 1309]   Temperature Pulse Respirations Blood Pressure SpO2   98 6 °F (37 °C) 97 22 (!) 183/81 (!) 84 %      Temp Source Heart Rate Source Patient Position - Orthostatic VS BP Location FiO2 (%)   Temporal Monitor Lying Left arm --      Pain Score       --           Vitals:    12/31/20 1309 12/31/20 1430 12/31/20 1500   BP: (!) 183/81 152/70 152/79   Pulse: 97 92 91   Patient Position - Orthostatic VS: Lying Lying Lying         Visual Acuity      ED Medications  Medications   heparin (porcine) injection 8,800 Units (has no administration in time range)   heparin (porcine) 25,000 units in 0 45% NaCl 250 mL infusion (premix) (has no administration in time range)   heparin (porcine) injection 8,800 Units (has no administration in time range)   heparin (porcine) injection 4,400 Units (has no administration in time range)   iohexol (OMNIPAQUE) 350 MG/ML injection (SINGLE-DOSE) 85 mL (85 mL Intravenous Given 12/31/20 1522)       Diagnostic Studies  Results Reviewed     Procedure Component Value Units Date/Time    APTT [[id number]] Collected: 12/31/20 1626    Lab Status: No result Specimen: Blood from Arm, Left     COVID19, Influenza A/B, RSV PCR, [facility] [[id number]]  (Abnormal) Collected: 12/31/20 1325    Lab Status: Final result Specimen: Nares from Nasopharyngeal Swab Updated: 12/31/20 1427     SARS-CoV-2 Positive     INFLUENZA A PCR Negative     INFLUENZA B PCR Negative     RSV PCR Negative    Narrative: This test has been authorized by FDA under an EUA (Emergency Use Assay) for use by authorized laboratories    Clinical caution and judgement should be used with the interpretation of these results with consideration of the clinical impression and other laboratory testing  Testing reported as "Positive" or "Negative" has been proven to be accurate according to standard laboratory validation requirements  All testing is performed with control materials showing appropriate reactivity at standard intervals      NT-BNP PRO [[id number]]  (Normal) Collected: 12/31/20 1325    Lab Status: Final result Specimen: Blood from Arm, Right Updated: 12/31/20 1400     NT-proBNP 250 pg/mL     D-dimer, quantitative [[id number]]  (Abnormal) Collected: 12/31/20 1325    Lab Status: Final result Specimen: Blood from Arm, Right Updated: 12/31/20 1356     D-Dimer, Quant 3 13 ug/ml FEU     Comprehensive metabolic panel [[id number]]  (Abnormal) Collected: 12/31/20 1325    Lab Status: Final result Specimen: Blood from Arm, Right Updated: 12/31/20 1354     Sodium 137 mmol/L      Potassium 3 3 mmol/L      Chloride 101 mmol/L      CO2 27 mmol/L      ANION GAP 9 mmol/L      BUN 19 mg/dL      Creatinine 1 19 mg/dL      Glucose 109 mg/dL      Calcium 8 2 mg/dL      Corrected Calcium 8 8 mg/dL      AST 55 U/L      ALT 46 U/L      Alkaline Phosphatase 208 U/L      Total Protein 7 3 g/dL      Albumin 3 3 g/dL      Total Bilirubin 0 50 mg/dL      eGFR 44 ml/min/1 73sq m     Narrative:      Td guidelines for Chronic Kidney Disease (CKD):     Stage 1 with normal or high GFR (GFR > 90 mL/min/1 73 square meters)    Stage 2 Mild CKD (GFR = 60-89 mL/min/1 73 square meters)    Stage 3A Moderate CKD (GFR = 45-59 mL/min/1 73 square meters)    Stage 3B Moderate CKD (GFR = 30-44 mL/min/1 73 square meters)    Stage 4 Severe CKD (GFR = 15-29 mL/min/1 73 square meters)    Stage 5 End Stage CKD (GFR <15 mL/min/1 73 square meters)  Note: GFR calculation is accurate only with a steady state creatinine    Blood gas, venous [[id number]]  (Abnormal) Collected: 12/31/20 1325    Lab Status: Final result Specimen: Blood from Arm, Right Updated: 12/31/20 1354     pH, Kamar 7 338     pCO2, Kamar 44 4 mm Hg      pO2, Kamar 30 5 mm Hg      HCO3, Kamar 23 3 mmol/L      Base Excess, Kamar -2 6 mmol/L      O2 Content, Kamar 10 3 ml/dL      O2 HGB, VENOUS 56 1 %     CBC and differential [[id number]] Collected: 12/31/20 1325    Lab Status: Final result Specimen: Blood from Arm, Right Updated: 12/31/20 1338     WBC 5 23 Thousand/uL      RBC 4 38 Million/uL      Hemoglobin 12 6 g/dL      Hematocrit 39 8 %      MCV 91 fL      MCH 28 8 pg      MCHC 31 7 g/dL      RDW 14 1 %      MPV 10 4 fL      Platelets 892 Thousands/uL      nRBC 0 /100 WBCs      Neutrophils Relative 75 %      Immat GRANS % 0 %      Lymphocytes Relative 19 %      Monocytes Relative 5 %      Eosinophils Relative 0 %      Basophils Relative 1 %      Neutrophils Absolute 3 94 Thousands/µL      Immature Grans Absolute 0 02 Thousand/uL      Lymphocytes Absolute 0 98 Thousands/µL      Monocytes Absolute 0 25 Thousand/µL      Eosinophils Absolute 0 01 Thousand/µL      Basophils Absolute 0 03 Thousands/µL     Procalcitonin with AM Reflex [[id number]] Collected: 12/31/20 1325    Lab Status: In process Specimen: Blood from Arm, Right Updated: 12/31/20 1336                 CTA ED chest PE study   Final Result by Jose Larose MD (12/31 1603)      1  Moderate bilateral acute pulmonary emboli  Measured RV/LV ratio is within normal limits at less than 0 9         2   Bilateral peripheral groundglass opacities consistent with COVID-19 pneumonia  3   Mediastinal and hilar lymphadenopathy, likely reactive  A follow-up chest CT in 3 months is recommended to ensure resolution  I personally discussed this study with CODY GRAHAM on 12/31/2020 at 3:53 PM                   Workstation performed: [id number]                    Procedures  CriticalCare Time  Performed by: Hal Mcwilliams DO  Authorized by:  Hal Mcwilliams DO     Critical care provider statement:     Critical care time (minutes):  30    Critical care time was exclusive of:  Separately billable procedures and treating other patients and teaching time    Critical care was necessary to treat or prevent imminent or life-threatening deterioration of the following conditions:  Respiratory failure and sepsis    Critical care was time spent personally by me on the following activities:  Obtaining history from patient or surrogate, development of treatment plan with patient or surrogate, discussions with consultants, evaluation of patient's response to treatment, examination of patient, ordering and performing treatments and interventions, ordering and review of laboratory studies, ordering and review of radiographic studies, re-evaluation of patient's condition and review of old charts    I assumed direction of critical care for this patient from another provider in my specialty: no               ED Course  ED Course as of Dec 31 1628   Thu Dec 31, 2020   1451 Results reviewed with patient  Will await CTA chest prior to admission for hypoxia       1606 CTA results reviewed with Radiologist and patient  Heparin started  Patient accepted for admission  Initial Sepsis Screening     Row Name 12/31/20 1406                Is the patient's history suggestive of a new or worsening infection? (!) Yes (Proceed)  -AC        Suspected source of infection  pneumonia  -AC        Are two or more of the following signs & symptoms of infection both present and new to the patient?   (!) Yes (Proceed)  -AC        Indicate SIRS criteria  Tachycardia > 90 bpm;Tachypnea > 20 resp per min  -AC        If the answer is yes to both questions, suspicion of sepsis is present  --        If severe sepsis is present AND tissue hypoperfusion perists in the hour after fluid resuscitation or lactate > 4, the patient meets criteria for SEPTIC SHOCK  --        Are any of the following organ dysfunction criteria present within 6 hours of suspected infection and SIRS criteria that are NOT considered to be chronic conditions? No Sepsis due to COVID  [facility]        Organ dysfunction  --        Date of presentation of severe sepsis  --        Time of presentation of severe sepsis  --        Tissue hypoperfusion persists in the hour after crystalloid fluid administration, evidenced, by either:  --        Was hypotension present within one hour of the conclusion of crystalloid fluid administration?  --        Date of presentation of septic shock  --        Time of presentation of septic shock  --          User Key  (r) = Recorded By, (t) = Taken By, (c) = Cosigned By    [address] Name Provider Type    [address], DO Physician          SBIRT 22yo+      Most Recent Value   SBIRT (23 yo +)   In order to provide better care to our patients, we are screening all of our patients for alcohol and drug use  Would it be okay to ask you these screening questions? Yes Filed at: 12/31/2020 1330   Initial Alcohol Screen: US AUDIT-C    1  How often do you have a drink containing alcohol?  0 Filed at: 12/31/2020 1330   2  How many drinks containing alcohol do you have on a typical day you are drinking? 0 Filed at: 12/31/2020 1330   3a  Male UNDER 65: How often do you have five or more drinks on one occasion? 0 Filed at: 12/31/2020 1330   3b  FEMALE Any Age, or MALE 65+: How often do you have 4 or more drinks on one occassion? 0 Filed at: 12/31/2020 1330   Audit-C Score  0 Filed at: 12/31/2020 1330   KAROLYN: How many times in the past year have you    Used an illegal drug or used a prescription medication for non-medical reasons? Never Filed at: 12/31/2020 1330                    MDM  Number of Diagnoses or Management Options  Diagnosis management comments: DDx:  Dyspnea/hypoxia - COVID or other URI, pneumonia, anemia, ARF, PE, less likely sepsis, doubt ACS/MI       A/P: Will check cardiopulmonary workup, treat symptoms, reevaluate for further work up and disposition (likely admission)  Disposition  Final diagnoses:   [id number] with pulmonary comorbidity   Bilateral pulmonary embolism ([facility] [address] )   Hypoxia     Time reflects when diagnosis was documented in both MDM as applicable and the Disposition within this note     Time User Action Codes Description Comment    12/31/2020  4:15 PM [address],[place]  [address], [address] [U07 1,  J98 4] COVID-19 with pulmonary comorbidity     12/31/2020  4:15 PM Jodine Likens Add [I26 99] Bilateral pulmonary embolism ([facility] [address] )     12/31/2020  4:15 PM [address],[place]  [address], [address] [R09 02] Hypoxia     12/31/2020  4:15 PM [address],[place]  [address], [address] [place] [address] [U07 1,  J98 4] COVID-19 with pulmonary comorbidity     12/31/2020  4:15 PM Jodine Likens Modify [I26 99] Bilateral pulmonary embolism ([facility] [address] )     12/31/2020  4:15 PM [address],[place]  [address], [address] [place] [address] [U07 1,  J98 4] COVID-19 with pulmonary comorbidity     12/31/2020  4:15 PM Ware Sena [I26 99] Bilateral pulmonary embolism [facility])       ED Disposition     ED Disposition Condition Date/Time Comment    Admit Stable Thu Dec 31, 2020  4:14 PM Case was discussed with Dr Gi Gibbons and the patient's admission status was agreed to be Admission Status: inpatient status to the service of Dr Gi Gibbons    Follow-up Information    None         Patient's Medications   Discharge Prescriptions    No medications on file     No discharge procedures on file      PDMP Review     None          ED Provider  Electronically Signed by           Cameron Monique DO  12/31/20 4226

## 2020-12-31 NOTE — RESPIRATORY THERAPY NOTE
RT Protocol Note  Trudy Sánchez August 75 y o  female MRN: [id number]  Unit/Bed#: 401-01 Encounter: [id number]    Assessment    Principal Problem:    Pneumonia due to COVID-19 virus  Active Problems:    Obstructive sleep apnea    Vitamin D deficiency    Essential hypertension    Bilateral pulmonary embolism (HCC)    Hypercholesterolemia    Lymphadenopathy    Acquired hypothyroidism    Hypokalemia    Transaminitis      Home Pulmonary Medications:  Diagnosis with ROBERT but never tested       Past Medical History:   Diagnosis Date    Allergic     Hypertension     last assessed 5/4/15     Hypothyroid      Social History     Socioeconomic History    Marital status: /Civil Union     Spouse name: None    Number of children: None    Years of education: None    Highest education level: None   Occupational History    Occupation: retired     Occupation:     Social Needs    Financial resource strain: None    Food insecurity     Worry: None     Inability: None    Transportation needs     Medical: None     Non-medical: None   Tobacco Use    Smoking status: Never Smoker    Smokeless tobacco: Never Used   Substance and Sexual Activity    Alcohol use: No     Alcohol/week: 0 0 standard drinks     Frequency: Never     Drinks per session: Patient refused     Binge frequency: Never    Drug use: No    Sexual activity: None   Lifestyle    Physical activity     Days per week: None     Minutes per session: None    Stress: None   Relationships    Social connections     Talks on phone: None     Gets together: None     Attends [employer] service: None     Active member of club or organization: None     Attends meetings of clubs or organizations: None     Relationship status: None    Intimate partner violence     Fear of current or ex partner: None     Emotionally abused: None     Physically abused: None     Forced sexual activity: None   Other Topics Concern    None   Social History Narrative    Lives with friend / boyfriend        Subjective         Objective    Physical Exam:   Assessment Type: Assess only  General Appearance: Awake, Alert  Respiratory Pattern: Spontaneous, Dyspnea with exertion  Chest Assessment: Chest expansion symmetrical  Bilateral Breath Sounds: Coarse, Expiratory wheezes    Vitals:  Blood pressure 148/75, pulse 97, temperature 97 9 °F (36 6 °C), temperature source Oral, resp  rate 22, height 5' 4" (1 626 m), weight 110 kg (242 lb 8 1 oz), SpO2 93 %  Imaging and other studies: I have personally reviewed pertinent reports  Plan    Respiratory Plan: Mild Distress pathway      QID Albuterol MDI  Wean O2 as pt tolerated

## 2021-01-01 PROBLEM — E87.1 HYPONATREMIA: Status: ACTIVE | Noted: 2021-01-01

## 2021-01-01 PROBLEM — R79.89 LOW TSH LEVEL: Status: ACTIVE | Noted: 2021-01-01

## 2021-01-01 LAB
25(OH)D3 SERPL-MCNC: 47 NG/ML (ref 30–100)
ABO GROUP BLD: NORMAL
ABO GROUP BLD: NORMAL
ALBUMIN SERPL BCP-MCNC: 2.9 G/DL (ref 3.5–5)
ALP SERPL-CCNC: 199 U/L (ref 46–116)
ALT SERPL W P-5'-P-CCNC: 40 U/L (ref 12–78)
ANION GAP SERPL CALCULATED.3IONS-SCNC: 10 MMOL/L (ref 4–13)
APTT PPP: 191 SECONDS (ref 23–37)
APTT PPP: >210 SECONDS (ref 23–37)
APTT PPP: >210 SECONDS (ref 23–37)
AST SERPL W P-5'-P-CCNC: 57 U/L (ref 5–45)
BASOPHILS # BLD MANUAL: 0 THOUSAND/UL (ref 0–0.1)
BASOPHILS NFR MAR MANUAL: 0 % (ref 0–1)
BILIRUB SERPL-MCNC: 0.4 MG/DL (ref 0.2–1)
BLD GP AB SCN SERPL QL: NEGATIVE
BUN SERPL-MCNC: 18 MG/DL (ref 5–25)
CALCIUM ALBUM COR SERPL-MCNC: 8.9 MG/DL (ref 8.3–10.1)
CALCIUM SERPL-MCNC: 8 MG/DL (ref 8.3–10.1)
CHLORIDE SERPL-SCNC: 102 MMOL/L (ref 100–108)
CK SERPL-CCNC: 121 U/L (ref 26–192)
CO2 SERPL-SCNC: 23 MMOL/L (ref 21–32)
CREAT SERPL-MCNC: 1.02 MG/DL (ref 0.6–1.3)
CRP SERPL QL: 77 MG/L
D DIMER PPP FEU-MCNC: 3.77 UG/ML FEU
EOSINOPHIL # BLD MANUAL: 0 THOUSAND/UL (ref 0–0.4)
EOSINOPHIL NFR BLD MANUAL: 0 % (ref 0–6)
ERYTHROCYTE [DISTWIDTH] IN BLOOD BY AUTOMATED COUNT: 14 % (ref 11.6–15.1)
EST. AVERAGE GLUCOSE BLD GHB EST-MCNC: 120 MG/DL
FERRITIN SERPL-MCNC: 159 NG/ML (ref 8–388)
GFR SERPL CREATININE-BSD FRML MDRD: 54 ML/MIN/1.73SQ M
GLUCOSE SERPL-MCNC: 160 MG/DL (ref 65–140)
HAV IGM SER QL: NORMAL
HBA1C MFR BLD: 5.8 %
HBV CORE IGM SER QL: NORMAL
HBV SURFACE AG SER QL: NORMAL
HCT VFR BLD AUTO: 36.7 % (ref 34.8–46.1)
HCV AB SER QL: NORMAL
HGB BLD-MCNC: 11.6 G/DL (ref 11.5–15.4)
LYMPHOCYTES # BLD AUTO: 0.43 THOUSAND/UL (ref 0.6–4.47)
LYMPHOCYTES # BLD AUTO: 13 % (ref 14–44)
MAGNESIUM SERPL-MCNC: 2.1 MG/DL (ref 1.6–2.6)
MCH RBC QN AUTO: 28.7 PG (ref 26.8–34.3)
MCHC RBC AUTO-ENTMCNC: 31.6 G/DL (ref 31.4–37.4)
MCV RBC AUTO: 91 FL (ref 82–98)
MONOCYTES # BLD AUTO: 0.07 THOUSAND/UL (ref 0–1.22)
MONOCYTES NFR BLD: 2 % (ref 4–12)
NEUTROPHILS # BLD MANUAL: 2.81 THOUSAND/UL (ref 1.85–7.62)
NEUTS SEG NFR BLD AUTO: 85 % (ref 43–75)
NRBC BLD AUTO-RTO: 0 /100 WBCS
PHOSPHATE SERPL-MCNC: 3.5 MG/DL (ref 2.3–4.1)
PLATELET # BLD AUTO: 201 THOUSANDS/UL (ref 149–390)
PLATELET BLD QL SMEAR: ADEQUATE
PMV BLD AUTO: 10.7 FL (ref 8.9–12.7)
POTASSIUM SERPL-SCNC: 4.1 MMOL/L (ref 3.5–5.3)
PROCALCITONIN SERPL-MCNC: 0.1 NG/ML
PROT SERPL-MCNC: 6.9 G/DL (ref 6.4–8.2)
RBC # BLD AUTO: 4.04 MILLION/UL (ref 3.81–5.12)
RH BLD: NEGATIVE
RH BLD: NEGATIVE
SODIUM SERPL-SCNC: 135 MMOL/L (ref 136–145)
SPECIMEN EXPIRATION DATE: NORMAL
TOTAL CELLS COUNTED SPEC: 100
TROPONIN I SERPL-MCNC: <0.02 NG/ML
TSH SERPL DL<=0.05 MIU/L-ACNC: 0.22 UIU/ML (ref 0.36–3.74)
WBC # BLD AUTO: 3.31 THOUSAND/UL (ref 4.31–10.16)

## 2021-01-01 PROCEDURE — 85027 COMPLETE CBC AUTOMATED: CPT | Performed by: INTERNAL MEDICINE

## 2021-01-01 PROCEDURE — 99232 SBSQ HOSP IP/OBS MODERATE 35: CPT | Performed by: INTERNAL MEDICINE

## 2021-01-01 PROCEDURE — 85379 FIBRIN DEGRADATION QUANT: CPT | Performed by: INTERNAL MEDICINE

## 2021-01-01 PROCEDURE — 84484 ASSAY OF TROPONIN QUANT: CPT | Performed by: INTERNAL MEDICINE

## 2021-01-01 PROCEDURE — 82550 ASSAY OF CK (CPK): CPT | Performed by: INTERNAL MEDICINE

## 2021-01-01 PROCEDURE — 82728 ASSAY OF FERRITIN: CPT | Performed by: INTERNAL MEDICINE

## 2021-01-01 PROCEDURE — 83036 HEMOGLOBIN GLYCOSYLATED A1C: CPT | Performed by: INTERNAL MEDICINE

## 2021-01-01 PROCEDURE — 97162 PT EVAL MOD COMPLEX 30 MIN: CPT

## 2021-01-01 PROCEDURE — 84145 PROCALCITONIN (PCT): CPT | Performed by: EMERGENCY MEDICINE

## 2021-01-01 PROCEDURE — XW13325 TRANSFUSION OF CONVALESCENT PLASMA (NONAUTOLOGOUS) INTO PERIPHERAL VEIN, PERCUTANEOUS APPROACH, NEW TECHNOLOGY GROUP 5: ICD-10-PCS | Performed by: INTERNAL MEDICINE

## 2021-01-01 PROCEDURE — 80074 ACUTE HEPATITIS PANEL: CPT | Performed by: INTERNAL MEDICINE

## 2021-01-01 PROCEDURE — 86901 BLOOD TYPING SEROLOGIC RH(D): CPT | Performed by: INTERNAL MEDICINE

## 2021-01-01 PROCEDURE — 85007 BL SMEAR W/DIFF WBC COUNT: CPT | Performed by: INTERNAL MEDICINE

## 2021-01-01 PROCEDURE — 80053 COMPREHEN METABOLIC PANEL: CPT | Performed by: INTERNAL MEDICINE

## 2021-01-01 PROCEDURE — 86900 BLOOD TYPING SEROLOGIC ABO: CPT | Performed by: INTERNAL MEDICINE

## 2021-01-01 PROCEDURE — 86850 RBC ANTIBODY SCREEN: CPT | Performed by: INTERNAL MEDICINE

## 2021-01-01 PROCEDURE — 85730 THROMBOPLASTIN TIME PARTIAL: CPT | Performed by: INTERNAL MEDICINE

## 2021-01-01 PROCEDURE — 84443 ASSAY THYROID STIM HORMONE: CPT | Performed by: INTERNAL MEDICINE

## 2021-01-01 PROCEDURE — 83735 ASSAY OF MAGNESIUM: CPT | Performed by: INTERNAL MEDICINE

## 2021-01-01 PROCEDURE — 82306 VITAMIN D 25 HYDROXY: CPT | Performed by: INTERNAL MEDICINE

## 2021-01-01 PROCEDURE — 84100 ASSAY OF PHOSPHORUS: CPT | Performed by: INTERNAL MEDICINE

## 2021-01-01 PROCEDURE — 86140 C-REACTIVE PROTEIN: CPT | Performed by: INTERNAL MEDICINE

## 2021-01-01 RX ORDER — SODIUM CHLORIDE 9 MG/ML
50 INJECTION, SOLUTION INTRAVENOUS CONTINUOUS
Status: DISCONTINUED | OUTPATIENT
Start: 2021-01-01 | End: 2021-01-02

## 2021-01-01 RX ADMIN — ALBUTEROL SULFATE 2 PUFF: 90 AEROSOL, METERED RESPIRATORY (INHALATION) at 17:59

## 2021-01-01 RX ADMIN — ALBUTEROL SULFATE 2 PUFF: 90 AEROSOL, METERED RESPIRATORY (INHALATION) at 11:37

## 2021-01-01 RX ADMIN — DOXYCYCLINE 100 MG: 100 INJECTION, POWDER, LYOPHILIZED, FOR SOLUTION INTRAVENOUS at 11:35

## 2021-01-01 RX ADMIN — MONTELUKAST 10 MG: 10 TABLET, FILM COATED ORAL at 09:07

## 2021-01-01 RX ADMIN — ALBUTEROL SULFATE 2 PUFF: 90 AEROSOL, METERED RESPIRATORY (INHALATION) at 09:27

## 2021-01-01 RX ADMIN — CEFTRIAXONE 1000 MG: 1 INJECTION, SOLUTION INTRAVENOUS at 19:38

## 2021-01-01 RX ADMIN — FAMOTIDINE 20 MG: 20 TABLET, FILM COATED ORAL at 09:07

## 2021-01-01 RX ADMIN — OXYCODONE HYDROCHLORIDE AND ACETAMINOPHEN 1000 MG: 500 TABLET ORAL at 21:44

## 2021-01-01 RX ADMIN — ALBUTEROL SULFATE 2 PUFF: 90 AEROSOL, METERED RESPIRATORY (INHALATION) at 21:45

## 2021-01-01 RX ADMIN — OXYCODONE HYDROCHLORIDE AND ACETAMINOPHEN 1000 MG: 500 TABLET ORAL at 07:34

## 2021-01-01 RX ADMIN — LEVOTHYROXINE SODIUM 100 MCG: 100 TABLET ORAL at 05:49

## 2021-01-01 RX ADMIN — ZINC SULFATE 220 MG (50 MG) CAPSULE 220 MG: CAPSULE at 09:07

## 2021-01-01 RX ADMIN — LORATADINE 10 MG: 10 TABLET ORAL at 09:07

## 2021-01-01 RX ADMIN — Medication 2000 UNITS: at 09:07

## 2021-01-01 RX ADMIN — LOSARTAN POTASSIUM 25 MG: 25 TABLET, FILM COATED ORAL at 09:07

## 2021-01-01 RX ADMIN — DEXAMETHASONE SODIUM PHOSPHATE 6 MG: 4 INJECTION, SOLUTION INTRA-ARTICULAR; INTRALESIONAL; INTRAMUSCULAR; INTRAVENOUS; SOFT TISSUE at 17:58

## 2021-01-01 RX ADMIN — DOXYCYCLINE 100 MG: 100 INJECTION, POWDER, LYOPHILIZED, FOR SOLUTION INTRAVENOUS at 22:44

## 2021-01-01 RX ADMIN — SODIUM CHLORIDE 50 ML/HR: 0.9 INJECTION, SOLUTION INTRAVENOUS at 12:27

## 2021-01-01 RX ADMIN — REMDESIVIR 100 MG: 100 INJECTION, POWDER, LYOPHILIZED, FOR SOLUTION INTRAVENOUS at 17:58

## 2021-01-01 RX ADMIN — HEPARIN SODIUM 12 UNITS/KG/HR: 10000 INJECTION, SOLUTION INTRAVENOUS at 09:09

## 2021-01-01 RX ADMIN — AMLODIPINE BESYLATE 2.5 MG: 2.5 TABLET ORAL at 09:07

## 2021-01-01 NOTE — ASSESSMENT & PLAN NOTE
· Required 3 lpm of continuous supplemental oxygen to maintain oxygen saturation levels at 92% and above at the time of admission  · Currently requiring 2 5 lpm of continuous supplemental oxygen to maintain oxygen saturation levels at 92% and above  · Give remdesivir 200 mg IV x 1 dose on 12/31/2020 and continue remdesivir 100 mg IV every 24 hours (day #2) to complete the 5-day treatment course  · Continue dexamethason 6 mg IV every 24 hours (day #2)  · Convalescent plasma has been ordered on 01/01/2021  · Utilize ceftriaxone 1000 mg IV every 24 hours and doxycycline 100 mg IV every 12 hours  · Follow the procalcitonin level to see if continued antibiotic therapy is indicated  · Utilize ascorbic acid 1000 mg PO every 12 hours, cholecalciferol 2000 I  U  PO Qdaily, and zinc sulfate 220 mg PO Qdaily  · Hold high-intensity statin therapy at this time secondary to transaminitis  · Utilize famotidine 20 mg PO Qdaily  · With the patient having bilateral pulmonary emboli, continue an IV heparin drip/infusion per the VTE/PE scale (Raschke protocol)  · Follow inflammatory markers with a daily D-dimer level, a daily CRP level, and a daily ferritin level

## 2021-01-01 NOTE — ASSESSMENT & PLAN NOTE
· Admit to med/surg level of care with telemetry monitoring in the setting of bilateral pulmonary emboli  · Currently requiring 3 lpm of continuous supplemental oxygen to maintain oxygen saturation levels at 92% and above  · Give remdesivir 200 mg IV x 1 dose on 12/31/2020 and continue remdesivir 100 mg IV every 24 hours to complete the 5-day treatment course  · Utilize dexamethason 6 mg IV every 24 hours  · Utilize ceftriaxone 1000 mg IV every 24 hours and doxycycline 100 mg IV every 12 hours  · Check and follow the procalcitonin level to see if continued antibiotic therapy is indicated  · Utilize ascorbic acid 1000 mg PO every 12 hours, cholecalciferol 2000 I  U  PO Qdaily, and zinc sulfate 220 mg PO Qdaily  · Increase statin therapy to high-intensity statin therapy with atorvastatin 40 mg daily with dinner in the setting of COVID-19 pneumonia  · Utilize famotidine 20 mg PO Qdaily  · With the patient having bilateral pulmonary emboli, utilize an IV heparin drip/infusion per the VTE/PE scale (Raschke protocol)  · Follow inflammatory markers with a daily D-dimer level, a daily CRP level, and a daily ferritin level

## 2021-01-01 NOTE — RESPIRATORY THERAPY NOTE
12/31/20 2100   Oxygen Therapy/Pulse Ox   O2 Device Nasal cannula   O2 Therapy Oxygen   Nasal Cannula O2 Flow Rate (L/min) 3 L/min   Calculated FIO2 (%) - Nasal Cannula 32   SpO2 92 %   SpO2 Activity At Rest   $ Pulse Oximetry Spot Check Charge Completed       I gave patient an incentive spirometer, instructed and educated patient on it, patient gave good return technique, she could only go up to about 900  NPC noted on any exertion  Patient instructed on self-proning and the importance of it, patient stated she can lay on her side, she will try to get over as much as possible, patient ambulated to the bedside commode, her saturation dropped to 86%   Nurse notified

## 2021-01-01 NOTE — ASSESSMENT & PLAN NOTE
· Likely secondary to COVID-19 pneumonia  · Utilize an IV heparin drip/infusion per the VTE/PE scale (Raschke protocol)  · Check a transthoracic echocardiogram to evaluate the patient for right heart strain  · Consult Pulmonology

## 2021-01-01 NOTE — ASSESSMENT & PLAN NOTE
· Check acute hepatitis panel  · Avoid all hepatotoxic agents  · Follow the liver function tests    Results from last 7 days   Lab Units 12/31/20  1325   SODIUM mmol/L 137   POTASSIUM mmol/L 3 3*   CHLORIDE mmol/L 101   CO2 mmol/L 27   BUN mg/dL 19   CREATININE mg/dL 1 19   CALCIUM mg/dL 8 2*   ALK PHOS U/L 208*   ALT U/L 46   AST U/L 55*

## 2021-01-01 NOTE — ASSESSMENT & PLAN NOTE
· Hold statin therapy at this time the setting of transaminitis  · Monitor the liver function tests with the increased statin dose

## 2021-01-01 NOTE — ASSESSMENT & PLAN NOTE
· Has probable obstructive sleep apnea per her PCP  · Was instructed to have a sleep study completed, which the patient has not yet done  · Needs an outpatient sleep study completed as soon as possible

## 2021-01-01 NOTE — ASSESSMENT & PLAN NOTE
· Resolved with potassium chloride 40 meQ PO x 1 dose on 12/31/2020  · Follow the potassium level and magnesium level    Results from last 7 days   Lab Units 01/01/21  0625 12/31/20  1325   SODIUM mmol/L 135* 137   POTASSIUM mmol/L 4 1 3 3*   CHLORIDE mmol/L 102 101   CO2 mmol/L 23 27   BUN mg/dL 18 19   CREATININE mg/dL 1 02 1 19   CALCIUM mg/dL 8 0* 8 2*

## 2021-01-01 NOTE — ASSESSMENT & PLAN NOTE
· Increase statin to high-intensity statin therapy with atorvastatin 40 mg daily with dinner in the setting of COVID-19 pneumonia  · Monitor the liver function tests with the increased statin dose

## 2021-01-01 NOTE — PLAN OF CARE
Problem: PHYSICAL THERAPY ADULT  Goal: Performs mobility at highest level of function for planned discharge setting  See evaluation for individualized goals  Note: Prognosis: Good  Problem List: Decreased strength, Decreased endurance, Impaired balance, Decreased mobility  Assessment: Pt is a 68 yo female admitted on 12/31/20 due to SOB  Pt is COVID +  Pt also + for B LE PEs  Pt with PMH including sleep apnea, hypothyroid, HTN  Pt [place]  [place]  She is I with gait and able to drive  A moderate complexity PT eval was completed on 1/1/21 due to decreased endurance, decreased strength, decreased balance, decreased I with gait and transfers, and decreased safety  Pt on 2 5 L of O2 during eval  Sats remained between 88 and 93%  Pt gait trained a short distance without device  Intermittently relying on bed for support  Pt may benefit from a RW upon d/c for community mobility  Pt will benefit from continued PT to progress I with gait and transfers, improve balance, improve endurance, and increase overall safety in order to maximize function and decrease risk for falls  PT Discharge Recommendation: Home with skilled therapy     PT - OK to Discharge: No    See flowsheet documentation for full assessment

## 2021-01-01 NOTE — PHYSICAL THERAPY NOTE
Physical Therapy Evaluation    Patient Name: Sadie Finley August Today's Date: 1/1/2021     Problem List  Principal Problem:    Pneumonia due to COVID-19 virus  Active Problems:    Elevated alkaline phosphatase level    Obstructive sleep apnea    Vitamin D deficiency    Essential hypertension    Bilateral pulmonary embolism (HCC)    Hypercholesterolemia    Lymphadenopathy    Acquired hypothyroidism    Hypokalemia    Transaminitis    Hyponatremia    Low TSH level       Past Medical History  Past Medical History:   Diagnosis Date    Allergic     Hypertension     last assessed 5/4/15     Hypothyroid         Past Surgical History  Past Surgical History:   Procedure Laterality Date    DILATION AND CURETTAGE OF UTERUS      GALLBLADDER SURGERY             01/01/21 1119   PT Last Visit   PT Visit Date 01/01/21   Note Type   Note type Evaluation   Pain Assessment   Pain Assessment Tool Pain Assessment not indicated - pt denies pain   Pain Score No Pain   Home Living   Type of Home Apartment   Home Layout One level   Bathroom Shower/Tub Tub/shower unit   Bathroom Toilet Standard   Bathroom Equipment Grab bars in shower   Bathroom Accessibility Accessible   Additional Comments Pt drives   Prior Function   Level of [place] Independent with ADLs and functional mobility   Lives With Spouse   Receives Help From Family   ADL Assistance Independent   IADLs Independent   Falls in the last 6 months 0   Comments Pt has no device  Reports that she may need a RW for home due to current endurance limitations  Restrictions/Precautions   Weight Bearing Precautions Per Order No   Other Precautions Airborne/isolation;Droplet precautions; Fall Risk;O2;Multiple lines   Cognition   Overall Cognitive Status WFL   Arousal/Participation Alert   Orientation Level Oriented X4   Memory Within functional limits   Following Commands Follows all commands and directions without difficulty RUE Assessment   RUE Assessment WFL   RUE Strength   RUE Overall Strength   (4/5)   LUE Assessment   LUE Assessment WFL   LUE Strength   LUE Overall Strength   (4/5)   RLE Assessment   RLE Assessment WFL   Strength RLE   RLE Overall Strength 4/5   LLE Assessment   LLE Assessment WFL   Strength LLE   LLE Overall Strength 4/5   Coordination   Movements are Fluid and Coordinated 1   Bed Mobility   Supine to Sit 5  Supervision   Additional items Bedrails   Transfers   Sit to Stand   (CGA to supervision)   Additional items Assist x 1   Stand to Sit   (CGA to supervision)   Additional items Assist x 1; Increased time required   Ambulation/Elevation   Gait pattern   (decreased step length)   Gait Assistance   (CGA to supervision)   Additional items Assist x 1   Assistive Device None   Distance   (10')   Balance   Static Sitting Good   Dynamic Sitting Good   Static Standing Fair   Dynamic Standing Fair   Ambulatory Fair   Endurance Deficit   Endurance Deficit Yes   Endurance Deficit Description   (SOB with activity)   Activity Tolerance   Activity Tolerance Patient limited by fatigue   Nurse Made Aware   Marcellus Pizano RN made aware)   Assessment   Prognosis Good   Problem List Decreased strength;Decreased endurance; Impaired balance;Decreased mobility   Assessment Pt is a 66 yo female admitted on 12/31/20 due to SOB  Pt is COVID +  Pt also + for B LE PEs  Pt with PMH including sleep apnea, hypothyroid, HTN  Pt [place] her  in a [place]  She is I with gait and able to drive  A moderate complexity PT eval was completed on 1/1/21 due to decreased endurance, decreased strength, decreased balance, decreased I with gait and transfers, and decreased safety  Pt on 2 5 L of O2 during eval  Sats remained between 88 and 93%  Pt gait trained a short distance without device  Intermittently relying on bed for support  Pt may benefit from a RW upon d/c for community mobility    Pt will benefit from continued PT to progress I with gait and transfers, improve balance, improve endurance, and increase overall safety in order to maximize function and decrease risk for falls  Goals   Patient Goals To go home   LTG Expiration Date 01/15/21   Long Term Goal #1 Pt will be mod I with transfers from all surfaces  Long Term Goal #2 Gait - 100' with least restrictive device vs without device with mod I    Plan   Treatment/Interventions Functional transfer training;LE strengthening/ROM; Therapeutic exercise; Endurance training;Patient/family training;Bed mobility;Gait training;Equipment eval/education   PT Frequency   (3-5 days/wk)   Recommendation   PT Discharge Recommendation Home with skilled therapy   PT - OK to Discharge No   AM-PAC Basic Mobility Inpatient   Turning in Bed Without Bedrails 4   Lying on Back to Sitting on Edge of Flat Bed 4   Moving Bed to Chair 3   Standing Up From Chair 3   Walk in Room 3   Climb 3-5 Stairs 2   Basic Mobility Inpatient Raw Score 19   Basic Mobility Standardized Score 42 48     Pt OOB in chair  Call bell in reach

## 2021-01-01 NOTE — QUICK NOTE
Convalescent Plasma was ordered on 1/1/2021  The Fact Sheet for Patients and Parents/Caregivers was provided to the patient and/or healthcare agent  The option to accept or refuse administration was offered  The risks, benefits, and alternatives to treatment were reviewed, and all questions addressed  Disclosure that this treatment is authorized for use under EUA and not FDA approved for treatment was discussed

## 2021-01-01 NOTE — ASSESSMENT & PLAN NOTE
· Continue her home dose PO levothyroxine  · Recheck a TSH level in 1-2 weeks with her PCP    Results for Bharathi Duarte (MRN [id number]) as of 1/1/2021 11:36   Ref   Range 1/1/2021 06:25   TSH 3RD GENERATON Latest Ref Range: 0 358 - 3 740 uIU/mL 0 217 (L)

## 2021-01-01 NOTE — PROGRESS NOTES
Progress Note - Court Embs August 1945, 67 y o  female MRN: [id number]    Unit/Bed#: 401-01 Encounter: [id number]    Primary Care Provider: DANIELLA Ocampo   Date and time admitted to hospital: 12/31/2020  1:12 PM        * Pneumonia due to COVID-19 virus  Assessment & Plan  · Required 3 lpm of continuous supplemental oxygen to maintain oxygen saturation levels at 92% and above at the time of admission  · Currently requiring 2 5 lpm of continuous supplemental oxygen to maintain oxygen saturation levels at 92% and above  · Received remdesivir 200 mg IV x 1 dose on 12/31/2020 and continue remdesivir 100 mg IV every 24 hours (day #2) to complete the 5-day treatment course  · Continue dexamethason 6 mg IV every 24 hours (day #2)  · Convalescent plasma has been ordered on 01/01/2021  · Utilize ceftriaxone 1000 mg IV every 24 hours and doxycycline 100 mg IV every 12 hours  · Follow the procalcitonin level to see if continued antibiotic therapy is indicated  · Utilize ascorbic acid 1000 mg PO every 12 hours, cholecalciferol 2000 I  U  PO Qdaily, and zinc sulfate 220 mg PO Qdaily  · Hold high-intensity statin therapy at this time secondary to transaminitis  · Utilize famotidine 20 mg PO Qdaily  · With the patient having bilateral pulmonary emboli, continue an IV heparin drip/infusion per the VTE/PE scale (Raschke protocol)  · Follow inflammatory markers with a daily D-dimer level, a daily CRP level, and a daily ferritin level    Bilateral pulmonary embolism (HCC)  Assessment & Plan  · Likely secondary to COVID-19 pneumonia  · Utilize an IV heparin drip/infusion per the VTE/PE scale (Raschke protocol)  · Check a transthoracic echocardiogram to evaluate the patient for right heart strain  · Consult Pulmonology    Low TSH level  Assessment & Plan  · Continue her home dose PO levothyroxine  · Recheck a TSH level in 1-2 weeks with her PCP    Results for Pratima Celestin (MRN [id number]) as of 1/1/2021 11:36   Ref  Range 1/1/2021 06:25   TSH 3RD GENERATON Latest Ref Range: 0 358 - 3 740 uIU/mL 0 217 (L)       Hyponatremia  Assessment & Plan  · Mild hyponatremia  · Likely hypovolemic hyponatremia  · Utilize NSS IV fluids at 50 ml/hr  · Follow the sodium level    Transaminitis  Assessment & Plan  · Hold statin therapy for now  · Check acute hepatitis panel  · Avoid all hepatotoxic agents  · Follow the liver function tests    Results from last 7 days   Lab Units 01/01/21  0625 12/31/20  1325   SODIUM mmol/L 135* 137   POTASSIUM mmol/L 4 1 3 3*   CHLORIDE mmol/L 102 101   CO2 mmol/L 23 27   BUN mg/dL 18 19   CREATININE mg/dL 1 02 1 19   CALCIUM mg/dL 8 0* 8 2*   ALK PHOS U/L 199* 208*   ALT U/L 40 46   AST U/L 57* 55*         Hypokalemia  Assessment & Plan  · Resolved with potassium chloride 40 meQ PO x 1 dose on 12/31/2020  · Follow the potassium level and magnesium level    Results from last 7 days   Lab Units 01/01/21  0625 12/31/20  1325   SODIUM mmol/L 135* 137   POTASSIUM mmol/L 4 1 3 3*   CHLORIDE mmol/L 102 101   CO2 mmol/L 23 27   BUN mg/dL 18 19   CREATININE mg/dL 1 02 1 19   CALCIUM mg/dL 8 0* 8 2*         Acquired hypothyroidism  Assessment & Plan  · Continue her home dose PO levothyroxine  · Recheck a TSH level in 1-2 weeks with her PCP    Results for Berta Brito (MRN [id number]) as of 1/1/2021 11:36   Ref  Range 1/1/2021 06:25   TSH 3RD GENERATON Latest Ref Range: 0 358 - 3 740 uIU/mL 0 217 (L)       Lymphadenopathy  Assessment & Plan  · CTA of the chest/PE protocol (12/31/2020):  Mediastinal and hilar lymphadenopathy, likely reactive  A follow-up chest CT in 3 months is recommended to ensure resolution    · Check an LDH level  · Outpatient follow-up with Pulmonology    Hypercholesterolemia  Assessment & Plan  · Hold statin therapy at this time in the setting of transaminitis    Essential hypertension  Assessment & Plan  · Continue PO amlodipine and PO losartan  · Follow the blood pressure trend    Vitamin D deficiency  Assessment & Plan  · Check a vitamin D 25-OH level    Obstructive sleep apnea  Assessment & Plan  · Has probable obstructive sleep apnea per her PCP  · Was instructed to have a sleep study completed, which the patient has not yet done  · Needs an outpatient sleep study completed as soon as possible    Elevated alkaline phosphatase level  Assessment & Plan  · Secondary to unclear etiology  · Follow the total alkaline phosphatase level      VTE Pharmacologic Prophylaxis:   Pharmacologic: Heparin Drip per the VTE/PE scale (Raschke protocol)  Mechanical VTE Prophylaxis in Place: No SCD's with a possible DVT of the lower extremities    Patient Centered Rounds: I have performed bedside rounds with nursing staff today  Time Spent for Care: 30 minutes  More than 50% of total time spent on counseling and coordination of care as described above  Current Length of Stay: 1 day(s)    Current Patient Status: Inpatient   Certification Statement: The patient will continue to require additional inpatient hospital stay due to the need for an IV heparin drip/infusion, for IV remdesivir treatment, for IV antibiotic treatment, for IV dexamethasone treatment, and with the patient currently requiring continuous supplemental oxygen to maintain adequate oxygen saturation levels  Code Status: Level 1 - Full Code      Subjective: The patient was seen and examined  The patient is doing better  The shortness of breath is improving  No chest pain  Objective:     Vitals:   Temp (24hrs), Av 4 °F (36 9 °C), Min:97 1 °F (36 2 °C), Max:100 °F (37 8 °C)    Temp:  [97 1 °F (36 2 °C)-100 °F (37 8 °C)] 98 2 °F (36 8 °C)  HR:  [] 73  Resp:  [18-22] 18  BP: (128-183)/(51-81) 128/51  SpO2:  [84 %-98 %] 96 %  Body mass index is 41 63 kg/m²  Input and Output Summary (last 24 hours):        Intake/Output Summary (Last 24 hours) at 2021 1143  Last data filed at 2021 0915  Gross per 24 hour   Intake 480 ml Output 300 ml   Net 180 ml       Physical Exam:     Physical Exam  General:  NAD, follows commands  HEENT:  NC/AT, mucous membranes moist  Neck:  Supple, No JVP elevation  CV:  + S1, + S2, RRR  Pulm:  Bibasilar crackles  Abd:  Soft, Non-tender, Non-distended  Ext:  No clubbing/cyanosis/edema  Skin:  No rashes  Neuro:  Awake, alert, oriented  Psych:  Normal mood and affect      Additional Data:    Labs:    Results from last 7 days   Lab Units 01/01/21  0625 12/31/20  1325   WBC Thousand/uL 3 31* 5 23   HEMOGLOBIN g/dL 11 6 12 6   HEMATOCRIT % 36 7 39 8   PLATELETS Thousands/uL 201 201   NEUTROS PCT %  --  75   LYMPHS PCT %  --  19   LYMPHO PCT % 13*  --    MONOS PCT %  --  5   MONO PCT % 2*  --    EOS PCT % 0 0     Results from last 7 days   Lab Units 01/01/21  0625   SODIUM mmol/L 135*   POTASSIUM mmol/L 4 1   CHLORIDE mmol/L 102   CO2 mmol/L 23   BUN mg/dL 18   CREATININE mg/dL 1 02   ANION GAP mmol/L 10   CALCIUM mg/dL 8 0*   ALBUMIN g/dL 2 9*   TOTAL BILIRUBIN mg/dL 0 40   ALK PHOS U/L 199*   ALT U/L 40   AST U/L 57*   GLUCOSE RANDOM mg/dL 160*                 Results from last 7 days   Lab Units 12/31/20  1325   PROCALCITONIN ng/ml 0 09           * I Have Reviewed All Lab Data Listed Above  * Additional Pertinent Lab Tests Reviewed:  All [facility] Admission Reviewed      Recent Cultures (last 7 days):           Last 24 Hours Medication List:   Current Facility-Administered Medications   Medication Dose Route Frequency Provider Last Rate    albuterol  2 puff Inhalation 4x Daily Tyesha Goltz, DO      amLODIPine  2 5 mg Oral Daily Tyesha Goltz, DO      ascorbic acid  1,000 mg Oral Q12H [address] Tyesha Goltz, DO      cefTRIAXone  1,000 mg Intravenous Q24H Tyesha Goltz, DO 1,000 mg (12/31/20 2139)    cholecalciferol  2,000 Units Oral Daily Tyesha Goltz, DO      dexamethasone  6 mg Intravenous Q24H Tyesha Goltz, DO      doxycycline  100 mg Intravenous Q12H Joann Barrios [place],  mg (01/01/21 1135)    famotidine  20 mg Oral Daily Cardozo Rolls, DO      gabapentin  100 mg Oral BID Cardozo Rolls, DO      heparin (porcine)  3-30 Units/kg/hr (Order-Specific) Intravenous Titrated Cardozo Rolls, DO 12 Units/kg/hr (01/01/21 0909)    heparin (porcine)  4,400 Units Intravenous Q1H PRN Cardozo Rolls, DO      heparin (porcine)  8,800 Units Intravenous Q1H PRN Cardozo Rolls, DO      levothyroxine  100 mcg Oral Early Morning Cardozo Rolls, DO      loratadine  10 mg Oral Daily Cardozo Rolls, DO      losartan  25 mg Oral Daily Cardozo Rolls, DO      montelukast  10 mg Oral Daily Cardozo Rolls, DO      zinc sulfate  220 mg Oral Daily Cardozo Rolls, DO      Followed by   Riya Agent ON 1/8/2021] multivitamin-minerals  1 tablet Oral Daily Cardozo Rolls, DO      remdesivir  100 mg Intravenous Q24H Cardozo Rolls, DO      sodium chloride  50 mL/hr Intravenous Continuous Cardozo Rolls, DO          Today, Patient Was Seen By: Cas Cole DO    ** Please Note: Dictation voice to text software may have been used in the creation of this document   **

## 2021-01-01 NOTE — PLAN OF CARE
Problem: Potential for Falls  Goal: Patient will remain free of falls  Description: INTERVENTIONS:  - Assess patient frequently for physical needs  -  Identify cognitive and physical deficits and behaviors that affect risk of falls    -  [place] fall precautions as indicated by assessment   - Educate patient/family on patient safety including physical limitations  - Instruct patient to call for assistance with activity based on assessment  - Modify environment to reduce risk of injury  - Consider OT/PT consult to assist with strengthening/mobility  Outcome: Progressing     Problem: PAIN - ADULT  Goal: Verbalizes/displays adequate comfort level or baseline comfort level  Description: Interventions:  - Encourage patient to monitor pain and request assistance  - Assess pain using appropriate pain scale  - Administer analgesics based on type and severity of pain and evaluate response  - Implement non-pharmacological measures as appropriate and evaluate response  - Consider cultural and social influences on pain and pain management  - Notify physician/advanced practitioner if interventions unsuccessful or patient reports new pain  Outcome: Adequate for Discharge     Problem: INFECTION - ADULT  Goal: Absence or prevention of progression during hospitalization  Description: INTERVENTIONS:  - Assess and monitor for signs and symptoms of infection  - Monitor lab/diagnostic results  - Monitor all insertion sites, i e  indwelling lines, tubes, and drains  - Monitor endotracheal if appropriate and nasal secretions for changes in amount and color  - [place] appropriate cooling/warming therapies per order  - Administer medications as ordered  - Instruct and encourage patient and family to use good hand hygiene technique  - Identify and instruct in appropriate isolation precautions for identified infection/condition  Outcome: Progressing  Goal: Absence of fever/infection during neutropenic period  Description: INTERVENTIONS:  - Monitor WBC    Outcome: Progressing     Problem: SAFETY ADULT  Goal: Patient will remain free of falls  Description: INTERVENTIONS:  - Assess patient frequently for physical needs  -  Identify cognitive and physical deficits and behaviors that affect risk of falls  -  [place] fall precautions as indicated by assessment   - Educate patient/family on patient safety including physical limitations  - Instruct patient to call for assistance with activity based on assessment  - Modify environment to reduce risk of injury  - Consider OT/PT consult to assist with strengthening/mobility  Outcome: Progressing  Goal: Maintain or return to baseline ADL function  Description: INTERVENTIONS:  - Assess patient frequently for physical needs  -  Identify cognitive and physical deficits and behaviors that affect risk of falls    -  [place] fall precautions as indicated by assessment   - Educate patient/family on patient safety including physical limitations  - Instruct patient to call for assistance with activity based on assessment  - Modify environment to reduce risk of injury  - Consider OT/PT consult to assist with strengthening/mobility  Outcome: Progressing  Goal: Maintain or return mobility status to optimal level  Description: INTERVENTIONS:  -  Assess patient's ability to carry out ADLs; assess patient's baseline for ADL function and identify physical deficits which impact ability to perform ADLs (bathing, care of mouth/teeth, toileting, grooming, dressing, etc )  - Assess/evaluate cause of self-care deficits   - Assess range of motion  - Assess patient's mobility; develop plan if impaired  - Assess patient's need for assistive devices and provide as appropriate  - Encourage maximum independence but intervene and supervise when necessary  - Involve family in performance of ADLs  - Assess for home care needs following discharge   - Consider OT consult to assist with ADL evaluation and planning for discharge  - Provide patient education as appropriate  Outcome: Progressing     Problem: DISCHARGE PLANNING  Goal: Discharge to home or other facility with appropriate resources  Description: INTERVENTIONS:  - Identify barriers to discharge w/patient and caregiver  - Arrange for needed discharge resources and transportation as appropriate  - Identify discharge learning needs (meds, wound care, etc )  - Arrange for interpretive services to assist at discharge as needed  - Refer to Case Management Department for coordinating discharge planning if the patient needs post-hospital services based on physician/advanced practitioner order or complex needs related to functional status, cognitive ability, or social support system  Outcome: Progressing     Problem: Knowledge Deficit  Goal: Patient/family/caregiver demonstrates understanding of disease process, treatment plan, medications, and discharge instructions  Description: Complete learning assessment and assess knowledge base    Interventions:  - Provide teaching at level of understanding  - Provide teaching via preferred learning methods  Outcome: Progressing

## 2021-01-01 NOTE — ASSESSMENT & PLAN NOTE
· CTA of the chest/PE protocol (12/31/2020):  Mediastinal and hilar lymphadenopathy, likely reactive  A follow-up chest CT in 3 months is recommended to ensure resolution    · Check an LDH level  · Outpatient follow-up with Pulmonology

## 2021-01-01 NOTE — ASSESSMENT & PLAN NOTE
· CTA of the chest/PE protocol (12/31/2020):  Mediastinal and hilar lymphadenopathy, likely reactive  A follow-up chest CT in 3 months is recommended to ensure resolution    · Outpatient follow-up with Pulmonology

## 2021-01-01 NOTE — PROGRESS NOTES
PTT is >210 for two blood draws this shift, heparin drip is currently at 12u/k/h and per protocol we will hold the drip for one hour then decrease by 3 u/k/h  Dr Toshia Busch made aware of PTT results  Will follow protocol per Dr Toshia Busch

## 2021-01-01 NOTE — H&P
H&P- Leena Oar August 1945, 67 y o  female MRN: [id number]    Unit/Bed#: 401-01 Encounter: [id number]    Primary Care Provider: DANIELLA Dumont   Date and time admitted to hospital: 12/31/2020  1:12 PM        * Pneumonia due to COVID-19 virus  Assessment & Plan  · Admit to med/surg level of care with telemetry monitoring in the setting of bilateral pulmonary emboli  · Currently requiring 3 lpm of continuous supplemental oxygen to maintain oxygen saturation levels at 92% and above  · Give remdesivir 200 mg IV x 1 dose on 12/31/2020 and continue remdesivir 100 mg IV every 24 hours to complete the 5-day treatment course  · Utilize dexamethason 6 mg IV every 24 hours  · Utilize ceftriaxone 1000 mg IV every 24 hours and doxycycline 100 mg IV every 12 hours  · Check and follow the procalcitonin level to see if continued antibiotic therapy is indicated  · Utilize ascorbic acid 1000 mg PO every 12 hours, cholecalciferol 2000 I  U  PO Qdaily, and zinc sulfate 220 mg PO Qdaily  · Increase statin therapy to high-intensity statin therapy with atorvastatin 40 mg daily with dinner in the setting of COVID-19 pneumonia  · Utilize famotidine 20 mg PO Qdaily  · With the patient having bilateral pulmonary emboli, utilize an IV heparin drip/infusion per the VTE/PE scale (Raschke protocol)  · Follow inflammatory markers with a daily D-dimer level, a daily CRP level, and a daily ferritin level    Bilateral pulmonary embolism (HCC)  Assessment & Plan  · Likely secondary to COVID-19 pneumonia  · Utilize an IV heparin drip/infusion per the VTE/PE scale (Raschke protocol)  · Check a transthoracic echocardiogram to evaluate the patient for right heart strain  · Consult Pulmonology    Transaminitis  Assessment & Plan  · Check acute hepatitis panel  · Avoid all hepatotoxic agents  · Follow the liver function tests    Results from last 7 days   Lab Units 12/31/20  1325   SODIUM mmol/L 137   POTASSIUM mmol/L 3 3*   CHLORIDE mmol/L 101   CO2 mmol/L 27   BUN mg/dL 19   CREATININE mg/dL 1 19   CALCIUM mg/dL 8 2*   ALK PHOS U/L 208*   ALT U/L 46   AST U/L 55*         Hypokalemia  Assessment & Plan  · Give potassium chloride 40 meQ PO x 1 dose  · Follow the potassium level and magnesium level    Acquired hypothyroidism  Assessment & Plan  · Continue her home dose of PO levothyroxine  · Check a TSH level    Lymphadenopathy  Assessment & Plan  · CTA of the chest/PE protocol (12/31/2020):  Mediastinal and hilar lymphadenopathy, likely reactive  A follow-up chest CT in 3 months is recommended to ensure resolution  · Outpatient follow-up with Pulmonology    Hypercholesterolemia  Assessment & Plan  · Increase statin to high-intensity statin therapy with atorvastatin 40 mg daily with dinner in the setting of COVID-19 pneumonia  · Monitor the liver function tests with the increased statin dose    Essential hypertension  Assessment & Plan  · Continue PO amlodipine and PO losartan  · Follow the blood pressure trend    Vitamin D deficiency  Assessment & Plan  · Check a vitamin D 25-OH level    Obstructive sleep apnea  Assessment & Plan  · Has probable obstructive sleep apnea per her PCP  · Was instructed to have a sleep study completed, which the patient has not yet done  · Needs an outpatient sleep study completed as soon as possible    Elevated alkaline phosphatase level  Assessment & Plan  · Secondary to unclear etiology  · Follow the total alkaline phosphatase level      VTE Prophylaxis: Heparin Drip per the VTE/PE (Raschke protocol)/ No SCD's with a possible DVT of the lower extremities  Code Status: Level 1-Full Code    Anticipated Length of Stay:  The patient will be admitted on an Inpatient basis with an anticipated length of stay of greater than 2 midnights    Justification for Hospital Stay:  The patient requires an IV heparin drip/infusion, IV remdesivir treatment, IV dexamethasone treatment, and is currently requiring 3 lpm of continuous supplemental oxygen to maintain adequate oxygen saturations  Chief Complaint:  Shortness of breath    History of Present Illness:    Trudy Quintana is a 67 y o  female who presents to the emergency department with the complaint of shortness of breath  Over the last 10 days, the patient has been experiencing shortness of breath at rest   The dyspnea worsened with any type of physical activity or exertion  The patient has also been experiencing nasal congestion and a productive cough  Over the last few days, the shortness of breath progressively worsened  The patient also experienced a syncopal event, so she presented to her PCP for further evaluation  The patient's PCP then sent the patient to the emergency department for further evaluation and treatment  Nothing seemed to improve her symptoms  Review of Systems:    Review of Systems:  Per HPI, all other systems have been reviewed and were negative  Past Medical and Surgical History:     Past Medical History:   Diagnosis Date    Allergic     Hypertension     last assessed 5/4/15     Hypothyroid        Past Surgical History:   Procedure Laterality Date    DILATION AND CURETTAGE OF UTERUS      GALLBLADDER SURGERY         Meds/Allergies:    Prior to Admission medications    Medication Sig Start Date End Date Taking?  Authorizing Provider   amLODIPine (NORVASC) 2 5 mg tablet TAKE 1 TABLET EVERY DAY 10/28/20   DANIELLA Reynolds   atorvastatin (LIPITOR) 20 mg tablet Take 1 tablet (20 mg total) by mouth daily 4/2/20   DANIELLA Reynolds   cetirizine (ZyrTEC) 10 mg tablet Take 1 tablet (10 mg total) by mouth daily 7/31/18   DANIELLA Reynolds   Cholecalciferol (VITAMIN D3) 1 25 MG (30110 UT) CAPS Take 1 capsule (50,000 Units total) by mouth once a week 4/2/20   DANIELLA Reynolds   diclofenac sodium (VOLTAREN) 50 mg EC tablet Take 1 tablet (50 mg total) by mouth every 12 (twelve) hours as needed (for pain) 1/31/20   Jeffrey Lujan DANIELLA Arevalo   fluticasone (FLONASE) 50 mcg/act nasal spray 1 spray into each nostril daily    Historical Provider, MD   gabapentin (NEURONTIN) 100 mg capsule Take 1 capsule (100 mg total) by mouth 2 (two) times a day 10/22/20   DANIELLA Juarez   ibuprofen (Advil) 200 mg tablet Take by mouth every 6 (six) hours as needed for mild pain    Historical Provider, MD   levothyroxine 100 mcg tablet Take 1 tablet (100 mcg total) by mouth daily 11/24/20   DANIELLA Juarez   losartan (COZAAR) 50 mg tablet Take 0 5 tablets (25 mg total) by mouth daily 7/31/18   DANIELLA Juarez   montelukast (SINGULAIR) 10 mg tablet Take 10 mg by mouth daily 7/14/19   Historical Provider, MD   omeprazole (PriLOSEC) 20 mg delayed release capsule Take 1 capsule (20 mg total) by mouth daily 4/2/20   DANIELLA Juarez     I have reviewed home medications with patient personally  Allergies:    Allergies   Allergen Reactions    Honey Tussin [Dextromethorphan]     Penicillins     Shrimp Extract Allergy Skin Test        Social History:     Marital Status: /Civil Union     Substance Use History:   Social History     Substance and Sexual Activity   Alcohol Use No    Frequency: Never    Binge frequency: Never     Social History     Tobacco Use   Smoking Status Never Smoker   Smokeless Tobacco Never Used     Social History     Substance and Sexual Activity   Drug Use No       Family History:    non-contributory    Physical Exam:     Vitals:   Blood Pressure: 148/75 (12/31/20 1748)  Pulse: 97 (12/31/20 1809)  Temperature: 97 9 °F (36 6 °C) (12/31/20 1748)  Temp Source: Oral (12/31/20 1748)  Respirations: 22 (12/31/20 1809)  Height: 5' 4" (162 6 cm) (12/31/20 1748)  Weight - Scale: 110 kg (242 lb 8 1 oz) (12/31/20 1748)  SpO2: 93 % (12/31/20 1809)    Physical Exam  General:  NAD, follows commands  HEENT:  NC/AT, mucous membranes moist  Neck:  Supple, No JVP elevation  CV:  + S1, + S2, RRR  Pulm: Bibasilar crackles  Abd:  Soft, Non-tender, Non-distended  Ext:  No clubbing/cyanosis/edema  Skin:  No rashes  Neuro:  Awake, alert, oriented  Psych:  Normal mood and affect      Additional Data:     Lab Results: I have personally reviewed pertinent reports  Results from last 7 days   Lab Units 12/31/20  1325   WBC Thousand/uL 5 23   HEMOGLOBIN g/dL 12 6   HEMATOCRIT % 39 8   PLATELETS Thousands/uL 201   NEUTROS PCT % 75   LYMPHS PCT % 19   MONOS PCT % 5   EOS PCT % 0     Results from last 7 days   Lab Units 12/31/20  1325   SODIUM mmol/L 137   POTASSIUM mmol/L 3 3*   CHLORIDE mmol/L 101   CO2 mmol/L 27   BUN mg/dL 19   CREATININE mg/dL 1 19   ANION GAP mmol/L 9   CALCIUM mg/dL 8 2*   ALBUMIN g/dL 3 3*   TOTAL BILIRUBIN mg/dL 0 50   ALK PHOS U/L 208*   ALT U/L 46   AST U/L 55*   GLUCOSE RANDOM mg/dL 109                       Imaging: I have personally reviewed pertinent reports  CTA ED chest PE study   Final Result by Dellis Scheuermann, MD (12/31 1603)      1  Moderate bilateral acute pulmonary emboli  Measured RV/LV ratio is within normal limits at less than 0 9         2   Bilateral peripheral groundglass opacities consistent with COVID-19 pneumonia  3   Mediastinal and hilar lymphadenopathy, likely reactive  A follow-up chest CT in 3 months is recommended to ensure resolution  I personally discussed this study with CODY GRAHAM on 12/31/2020 at 3:53 PM                   Workstation performed: [id number]             Allscripts / [employer] Records Reviewed: Yes     ** Please Note: This note has been constructed using a voice recognition system   **

## 2021-01-01 NOTE — UTILIZATION REVIEW
Initial Clinical Review    Admission: Date/Time/Statement:   Admission Orders (From admission, onward)     Ordered        12/31/20 1613  Inpatient Admission  Once                   Orders Placed This Encounter   Procedures    Inpatient Admission     Standing Status:   Standing     Number of Occurrences:   1     Order Specific Question:   Admitting Physician     Answer:   Rolando Garcia     Order Specific Question:   Level of Care     Answer:   Med Surg [16]     Order Specific Question:   Estimated length of stay     Answer:   More than 2 Midnights     Order Specific Question:   Certification     Answer:   I certify that inpatient services are medically necessary for this patient for a duration of greater than two midnights  See H&P and MD Progress Notes for additional information about the patient's course of treatment  ED Arrival Information     Expected Arrival Acuity Means of Arrival Escorted By Service Admission Type    - 12/31/2020 13:05 Emergent Walk-In Spouse Hospitalist Emergency    Arrival Complaint    SOB; Dizziness; Chills        Chief Complaint   Patient presents with    Shortness of Breath     Since shira has cough, congestion, shortness of breath, n/v/d, fever and chills     Assessment/Plan: 66 yo f presents to the Ed with SOB  She reports she has had SOB the past 10 days , worsening with any activity, along with nasal congestion and a productive cough,  She also reported a syncopal episode, and presented to her PCP  He referred her to the ED for further evaluation and treatment  CT showed bilateral pulmonary emboli and opacities consistent with COVID penumonia  ,  Started on Heparin additionally her  COVID test came back positive  She is admitted inpatient for COVID pneumonia and bilateral PE's                 ED Triage Vitals   Temperature Pulse Respirations Blood Pressure SpO2   12/31/20 1309 12/31/20 1309 12/31/20 1309 12/31/20 1309 12/31/20 1309   98 6 °F (37 °C) 97 22 (!) 183/81 (!) 84 % RA       Temp Source Heart Rate Source Patient Position - Orthostatic VS BP Location FiO2 (%)   12/31/20 1309 12/31/20 1309 12/31/20 1309 12/31/20 1309 --   Temporal Monitor Lying Left arm       Pain Score       12/31/20 1748       No Pain          Wt Readings from Last 1 Encounters:   01/01/21 110 kg (242 lb 8 1 oz)     Additional Vital Signs:   Date/Time  Temp  Pulse  Resp  BP  MAP (mmHg)  SpO2  Calculated FIO2 (%) - Nasal Cannula  Nasal Cannula O2 Flow Rate (L/min)  O2 Device  Patient Position - Orthostatic VS   01/01/21 0943  --  --  --  --  --  --  30  2 5 L/min  Nasal cannula  --   01/01/21 09:15:10  98 2 °F (36 8 °C)  73  --  128/51  77  96 %  --  --  --  --   01/01/21 0701  --  --  --  --  --  --  32  3 L/min  Nasal cannula  --   12/31/20 2100  --  --  --  --  --  92 %  32  3 L/min  Nasal cannula  --   12/31/20 19:54:51  100 °F (37 8 °C)  89  18  154/78  103  94 %  --  --  Nasal cannula  Lying   12/31/20 1815  --  --  --  --  --  --  32  3 L/min  Nasal cannula  --   12/31/20 1809  --  97  22  --  --  93 %  --  --  --  --   12/31/20 17:48:36  97 9 °F (36 6 °C)  91  18  148/75  99  89 %Abnormal   --  --  --  --   12/31/20 1630  --  95  21  150/67  96  95 %  32  3 L/min  Nasal cannula  Lying   12/31/20 1500  --  91  20  152/79  108  97 %  32  3 L/min  Nasal cannula  Lying   12/31/20 1430  --  92  20  152/70  101  98 %  32  3 L/min  Nasal cannula  Lying   12/31/20 1330  --  --  --  --  --  --  --  --  Nasal cannula  --           Pertinent Labs/Diagnostic Test Results:   Results from last 7 days   Lab Units 12/31/20  1325   SARS-COV-2  Positive*     Results from last 7 days   Lab Units 01/01/21  0625 12/31/20  1325   WBC Thousand/uL 3 31* 5 23   HEMOGLOBIN g/dL 11 6 12 6   HEMATOCRIT % 36 7 39 8   PLATELETS Thousands/uL 201 201   NEUTROS ABS Thousands/µL  --  3 94         Results from last 7 days   Lab Units 01/01/21  0625 12/31/20  1325   SODIUM mmol/L 135* 137   POTASSIUM mmol/L 4 1 3 3* CHLORIDE mmol/L 102 101   CO2 mmol/L 23 27   ANION GAP mmol/L 10 9   BUN mg/dL 18 19   CREATININE mg/dL 1 02 1 19   EGFR ml/min/1 73sq m 54 44   CALCIUM mg/dL 8 0* 8 2*   MAGNESIUM mg/dL 2 1  --    PHOSPHORUS mg/dL 3 5  --      Results from last 7 days   Lab Units 01/01/21  0625 12/31/20  1325   AST U/L 57* 55*   ALT U/L 40 46   ALK PHOS U/L 199* 208*   TOTAL PROTEIN g/dL 6 9 7 3   ALBUMIN g/dL 2 9* 3 3*   TOTAL BILIRUBIN mg/dL 0 40 0 50         Results from last 7 days   Lab Units 01/01/21  0625 12/31/20  1325   GLUCOSE RANDOM mg/dL 160* 109     Results from last 7 days   Lab Units 12/31/20  1325   PH MAGI  7 338   PCO2 MAGI mm Hg 44 4   PO2 MAGI mm Hg 30 5*   HCO3 MAGI mmol/L 23 3*   BASE EXC MAGI mmol/L -2 6   O2 CONTENT MAGI ml/dL 10 3   O2 HGB, VENOUS % 56 1*         Results from last 7 days   Lab Units 01/01/21  0625   CK TOTAL U/L 121     Results from last 7 days   Lab Units 01/01/21  0626   TROPONIN I ng/mL <0 02     Results from last 7 days   Lab Units 01/01/21  0625 12/31/20  1325   D-DIMER QUANTITATIVE ug/ml FEU 3 77* 3 13*     Results from last 7 days   Lab Units 01/01/21  0625 12/31/20  2232 12/31/20  1626   PTT seconds >210* >210* 33     Results from last 7 days   Lab Units 01/01/21  0625   TSH 3RD GENERATON uIU/mL 0 217*     Results from last 7 days   Lab Units 12/31/20  1325   PROCALCITONIN ng/ml 0 09     Results from last 7 days   Lab Units 12/31/20  1325   NT-PRO BNP pg/mL 250     Results from last 7 days   Lab Units 01/01/21  0625   CRP mg/L 77 0*     Results from last 7 days   Lab Units 12/31/20  1325   INFLUENZA A PCR  Negative   INFLUENZA B PCR  Negative   RSV PCR  Negative     Results from last 7 days   Lab Units 01/01/21  0625   TOTAL COUNTED  100     CTA Cgest - 12/31 -   1   Moderate bilateral acute pulmonary emboli  Measured RV/LV ratio is within normal limits at less than 0 9        2   Bilateral peripheral groundglass opacities consistent with COVID-19 pneumonia       3   Mediastinal and hilar lymphadenopathy, likely reactive    ED Treatment:   Medication Administration from 12/31/2020 1251 to 12/31/2020 1654       Date/Time Order Dose Route Action Comments     12/31/2020 1522 iohexol (OMNIPAQUE) 350 MG/ML injection (SINGLE-DOSE) 85 mL 85 mL Intravenous Given      12/31/2020 1630 heparin (porcine) injection 8,800 Units 8,800 Units Intravenous Given      12/31/2020 1630 heparin (porcine) 25,000 units in 0 45% NaCl 250 mL infusion (premix) 18 Units/kg/hr Intravenous New Bag         Past Medical History:   Diagnosis Date    Allergic     Hypertension     last assessed 5/4/15     Hypothyroid      Present on Admission:   Vitamin D deficiency   Obstructive sleep apnea   Essential hypertension   Elevated alkaline phosphatase level      Admitting Diagnosis: Chills [R68 83]  Dizziness [R42]  SOB (shortness of breath) [R06 02]  Hypoxia [R09 02]  Bilateral pulmonary embolism (HCC) [I26 99]  COVID-19 with pulmonary comorbidity [U07 1, J98 4]  Age/Sex: 67 y o  female       Admission Orders:  Scheduled Medications:  albuterol, 2 puff, Inhalation, 4x Daily  amLODIPine, 2 5 mg, Oral, Daily  ascorbic acid, 1,000 mg, Oral, Q12H ARLYN  cefTRIAXone, 1,000 mg, Intravenous, Q24H  cholecalciferol, 2,000 Units, Oral, Daily  dexamethasone, 6 mg, Intravenous, Q24H  doxycycline, 100 mg, Intravenous, Q12H  famotidine, 20 mg, Oral, Daily  gabapentin, 100 mg, Oral, BID  levothyroxine, 100 mcg, Oral, Early Morning  loratadine, 10 mg, Oral, Daily  losartan, 25 mg, Oral, Daily  montelukast, 10 mg, Oral, Daily  zinc sulfate, 220 mg, Oral, Daily    Followed by  Pauline Garrett ON 1/8/2021] multivitamin-minerals, 1 tablet, Oral, Daily  remdesivir, 100 mg, Intravenous, Q24H      Continuous IV Infusions:  heparin (porcine), 3-30 Units/kg/hr (Order-Specific), Intravenous, Titrated      PRN Meds:  heparin (porcine), 4,400 Units, Intravenous, Q1H PRN  heparin (porcine), 8,800 Units, Intravenous, Q1H PRN            Network Utilization Review Department  ATTENTION: Please call with any questions or concerns to [phone] and carefully listen to the prompts so that you are directed to the right person  All voicemails are confidential   Elizabeth Vargas all requests for admission clinical reviews, approved or denied determinations and any other requests to dedicated fax number below belonging to the campus where the patient is receiving treatment   List of dedicated fax numbers for the Facilities:  [address] DENIALS (Administrative/Medical Necessity) [phone]   [address] (Maternity/NICU/Pediatrics) [phone]   [address] Akira Guerrero 6539 (  Tere Contreras "[address]ul" [address]) [address] [place] [address] Raheem Agustín Rodríguez [address] [place] [address] [phone]

## 2021-01-01 NOTE — PROGRESS NOTES
Heparin drip put on hold and 1420 for 1 hour, PTT was >210, heparin drip restarted at 1520 and decreased from 12 units/kg/hr to 9 units/kg/hr, next PTT due @ 2020

## 2021-01-01 NOTE — ASSESSMENT & PLAN NOTE
· Mild hyponatremia  · Likely hypovolemic hyponatremia  · Utilize NSS IV fluids at 50 ml/hr  · Follow the sodium level

## 2021-01-02 PROBLEM — E87.8 HYPERCHLOREMIA: Status: ACTIVE | Noted: 2021-01-02

## 2021-01-02 PROBLEM — R73.9 HYPERGLYCEMIA: Status: ACTIVE | Noted: 2021-01-02

## 2021-01-02 LAB
ALBUMIN SERPL BCP-MCNC: 2.6 G/DL (ref 3.5–5)
ALP SERPL-CCNC: 158 U/L (ref 46–116)
ALT SERPL W P-5'-P-CCNC: 35 U/L (ref 12–78)
ANION GAP SERPL CALCULATED.3IONS-SCNC: 13 MMOL/L (ref 4–13)
APTT PPP: 111 SECONDS (ref 23–37)
AST SERPL W P-5'-P-CCNC: 43 U/L (ref 5–45)
BASOPHILS # BLD AUTO: 0 THOUSANDS/ΜL (ref 0–0.1)
BASOPHILS NFR BLD AUTO: 0 % (ref 0–1)
BILIRUB SERPL-MCNC: 0.3 MG/DL (ref 0.2–1)
BUN SERPL-MCNC: 23 MG/DL (ref 5–25)
CALCIUM ALBUM COR SERPL-MCNC: 8.8 MG/DL (ref 8.3–10.1)
CALCIUM SERPL-MCNC: 7.7 MG/DL (ref 8.3–10.1)
CHLORIDE SERPL-SCNC: 109 MMOL/L (ref 100–108)
CO2 SERPL-SCNC: 21 MMOL/L (ref 21–32)
CREAT SERPL-MCNC: 0.92 MG/DL (ref 0.6–1.3)
CRP SERPL QL: 36 MG/L
D DIMER PPP FEU-MCNC: 2.64 UG/ML FEU
EOSINOPHIL # BLD AUTO: 0 THOUSAND/ΜL (ref 0–0.61)
EOSINOPHIL NFR BLD AUTO: 0 % (ref 0–6)
ERYTHROCYTE [DISTWIDTH] IN BLOOD BY AUTOMATED COUNT: 14.2 % (ref 11.6–15.1)
FERRITIN SERPL-MCNC: 145 NG/ML (ref 8–388)
GFR SERPL CREATININE-BSD FRML MDRD: 61 ML/MIN/1.73SQ M
GLUCOSE SERPL-MCNC: 166 MG/DL (ref 65–140)
HCT VFR BLD AUTO: 34.5 % (ref 34.8–46.1)
HGB BLD-MCNC: 10.9 G/DL (ref 11.5–15.4)
IMM GRANULOCYTES # BLD AUTO: 0.02 THOUSAND/UL (ref 0–0.2)
IMM GRANULOCYTES NFR BLD AUTO: 0 % (ref 0–2)
LDH SERPL-CCNC: 486 U/L (ref 81–234)
LYMPHOCYTES # BLD AUTO: 0.86 THOUSANDS/ΜL (ref 0.6–4.47)
LYMPHOCYTES NFR BLD AUTO: 15 % (ref 14–44)
MAGNESIUM SERPL-MCNC: 1.9 MG/DL (ref 1.6–2.6)
MCH RBC QN AUTO: 28.2 PG (ref 26.8–34.3)
MCHC RBC AUTO-ENTMCNC: 31.6 G/DL (ref 31.4–37.4)
MCV RBC AUTO: 89 FL (ref 82–98)
MONOCYTES # BLD AUTO: 0.17 THOUSAND/ΜL (ref 0.17–1.22)
MONOCYTES NFR BLD AUTO: 3 % (ref 4–12)
NEUTROPHILS # BLD AUTO: 4.71 THOUSANDS/ΜL (ref 1.85–7.62)
NEUTS SEG NFR BLD AUTO: 82 % (ref 43–75)
NRBC BLD AUTO-RTO: 0 /100 WBCS
PHOSPHATE SERPL-MCNC: 3.3 MG/DL (ref 2.3–4.1)
PLATELET # BLD AUTO: 220 THOUSANDS/UL (ref 149–390)
PMV BLD AUTO: 10.7 FL (ref 8.9–12.7)
POTASSIUM SERPL-SCNC: 3.9 MMOL/L (ref 3.5–5.3)
PROCALCITONIN SERPL-MCNC: 0.09 NG/ML
PROT SERPL-MCNC: 6.1 G/DL (ref 6.4–8.2)
RBC # BLD AUTO: 3.86 MILLION/UL (ref 3.81–5.12)
SARS-COV-2 RNA SPEC QL NAA+PROBE: DETECTED
SODIUM SERPL-SCNC: 143 MMOL/L (ref 136–145)
TROPONIN I SERPL-MCNC: <0.02 NG/ML
WBC # BLD AUTO: 5.76 THOUSAND/UL (ref 4.31–10.16)

## 2021-01-02 PROCEDURE — 83735 ASSAY OF MAGNESIUM: CPT | Performed by: INTERNAL MEDICINE

## 2021-01-02 PROCEDURE — 84100 ASSAY OF PHOSPHORUS: CPT | Performed by: INTERNAL MEDICINE

## 2021-01-02 PROCEDURE — 85025 COMPLETE CBC W/AUTO DIFF WBC: CPT | Performed by: INTERNAL MEDICINE

## 2021-01-02 PROCEDURE — 85730 THROMBOPLASTIN TIME PARTIAL: CPT | Performed by: INTERNAL MEDICINE

## 2021-01-02 PROCEDURE — 86140 C-REACTIVE PROTEIN: CPT | Performed by: INTERNAL MEDICINE

## 2021-01-02 PROCEDURE — 84145 PROCALCITONIN (PCT): CPT | Performed by: INTERNAL MEDICINE

## 2021-01-02 PROCEDURE — 85379 FIBRIN DEGRADATION QUANT: CPT | Performed by: INTERNAL MEDICINE

## 2021-01-02 PROCEDURE — 84484 ASSAY OF TROPONIN QUANT: CPT | Performed by: INTERNAL MEDICINE

## 2021-01-02 PROCEDURE — 82728 ASSAY OF FERRITIN: CPT | Performed by: INTERNAL MEDICINE

## 2021-01-02 PROCEDURE — 83615 LACTATE (LD) (LDH) ENZYME: CPT | Performed by: INTERNAL MEDICINE

## 2021-01-02 PROCEDURE — 99232 SBSQ HOSP IP/OBS MODERATE 35: CPT | Performed by: INTERNAL MEDICINE

## 2021-01-02 PROCEDURE — 80053 COMPREHEN METABOLIC PANEL: CPT | Performed by: INTERNAL MEDICINE

## 2021-01-02 RX ORDER — POTASSIUM CHLORIDE 20 MEQ/1
20 TABLET, EXTENDED RELEASE ORAL ONCE
Status: COMPLETED | OUTPATIENT
Start: 2021-01-02 | End: 2021-01-02

## 2021-01-02 RX ORDER — ALBUTEROL SULFATE 90 UG/1
2 AEROSOL, METERED RESPIRATORY (INHALATION) EVERY 4 HOURS PRN
Status: DISCONTINUED | OUTPATIENT
Start: 2021-01-02 | End: 2021-01-05 | Stop reason: HOSPADM

## 2021-01-02 RX ADMIN — ZINC SULFATE 220 MG (50 MG) CAPSULE 220 MG: CAPSULE at 08:16

## 2021-01-02 RX ADMIN — MONTELUKAST 10 MG: 10 TABLET, FILM COATED ORAL at 08:16

## 2021-01-02 RX ADMIN — ENOXAPARIN SODIUM 105 MG: 120 INJECTION SUBCUTANEOUS at 21:38

## 2021-01-02 RX ADMIN — ALBUTEROL SULFATE 2 PUFF: 90 AEROSOL, METERED RESPIRATORY (INHALATION) at 08:17

## 2021-01-02 RX ADMIN — AMLODIPINE BESYLATE 2.5 MG: 2.5 TABLET ORAL at 08:16

## 2021-01-02 RX ADMIN — GABAPENTIN 100 MG: 100 CAPSULE ORAL at 08:16

## 2021-01-02 RX ADMIN — OXYCODONE HYDROCHLORIDE AND ACETAMINOPHEN 1000 MG: 500 TABLET ORAL at 08:16

## 2021-01-02 RX ADMIN — DOXYCYCLINE 100 MG: 100 INJECTION, POWDER, LYOPHILIZED, FOR SOLUTION INTRAVENOUS at 11:10

## 2021-01-02 RX ADMIN — FAMOTIDINE 20 MG: 20 TABLET, FILM COATED ORAL at 08:16

## 2021-01-02 RX ADMIN — POTASSIUM CHLORIDE 20 MEQ: 1500 TABLET, EXTENDED RELEASE ORAL at 08:16

## 2021-01-02 RX ADMIN — DOXYCYCLINE 100 MG: 100 INJECTION, POWDER, LYOPHILIZED, FOR SOLUTION INTRAVENOUS at 21:40

## 2021-01-02 RX ADMIN — Medication 2000 UNITS: at 08:16

## 2021-01-02 RX ADMIN — DEXAMETHASONE SODIUM PHOSPHATE 6 MG: 4 INJECTION, SOLUTION INTRA-ARTICULAR; INTRALESIONAL; INTRAMUSCULAR; INTRAVENOUS; SOFT TISSUE at 17:42

## 2021-01-02 RX ADMIN — REMDESIVIR 100 MG: 100 INJECTION, POWDER, LYOPHILIZED, FOR SOLUTION INTRAVENOUS at 17:42

## 2021-01-02 RX ADMIN — CEFTRIAXONE 1000 MG: 1 INJECTION, SOLUTION INTRAVENOUS at 20:01

## 2021-01-02 RX ADMIN — LORATADINE 10 MG: 10 TABLET ORAL at 08:16

## 2021-01-02 RX ADMIN — MAGNESIUM OXIDE TAB 400 MG (241.3 MG ELEMENTAL MG) 400 MG: 400 (241.3 MG) TAB at 08:16

## 2021-01-02 RX ADMIN — OXYCODONE HYDROCHLORIDE AND ACETAMINOPHEN 1000 MG: 500 TABLET ORAL at 21:38

## 2021-01-02 RX ADMIN — SODIUM CHLORIDE 50 ML/HR: 0.9 INJECTION, SOLUTION INTRAVENOUS at 08:38

## 2021-01-02 RX ADMIN — LEVOTHYROXINE SODIUM 100 MCG: 100 TABLET ORAL at 06:10

## 2021-01-02 RX ADMIN — LOSARTAN POTASSIUM 25 MG: 25 TABLET, FILM COATED ORAL at 08:16

## 2021-01-02 RX ADMIN — ENOXAPARIN SODIUM 105 MG: 120 INJECTION SUBCUTANEOUS at 08:17

## 2021-01-02 NOTE — ASSESSMENT & PLAN NOTE
· Likely secondary to COVID-19 pneumonia  · Change the IV heparin drip/infusion per the VTE/PE scale (Raschke protocol) to lovenox 1 mg/kg SQ every 12 hours  · Check a transthoracic echocardiogram to evaluate the patient for right heart strain  · Consult Pulmonology

## 2021-01-02 NOTE — ASSESSMENT & PLAN NOTE
· Secondary to IV dexamethasone treatment  · Check a hemoglobin A1c level  · Follow the blood glucose trend

## 2021-01-02 NOTE — ASSESSMENT & PLAN NOTE
· Required 3 lpm of continuous supplemental oxygen to maintain oxygen saturation levels at 92% and above at the time of admission  · Currently requiring 1 5 lpm of continuous supplemental oxygen to maintain oxygen saturation levels at 92% and above  · Give remdesivir 200 mg IV x 1 dose on 12/31/2020 and continue remdesivir 100 mg IV every 24 hours (day #3) to complete the 5-day treatment course  · Continue dexamethason 6 mg IV every 24 hours (day #3)  · Convalescent plasma has been ordered on 01/01/2021  · Utilize ceftriaxone 1000 mg IV every 24 hours and doxycycline 100 mg IV every 12 hours  · Follow the procalcitonin level to see if continued antibiotic therapy is indicated  · Utilize ascorbic acid 1000 mg PO every 12 hours, cholecalciferol 2000 I  U  PO Qdaily, and zinc sulfate 220 mg PO Qdaily  · Hold high-intensity statin therapy at this time secondary to transaminitis  · Utilize famotidine 20 mg PO Qdaily  · With the patient having bilateral pulmonary emboli, initially treated with an IV heparin drip/infusion per the VTE/PE scale (Raschke protocol), which was changed to lovenox 1 mg/kg SQ every 12 hours on 01/02/2021  · Follow inflammatory markers with a daily D-dimer level, a daily CRP level, and a daily ferritin level

## 2021-01-02 NOTE — ASSESSMENT & PLAN NOTE
· Mild hyponatremia  · Likely hypovolemic hyponatremia  · Resolved  · Discontinue the NSS IV fluids on 01/02/2021 in the setting of hyperchloremia  · Follow the sodium level    Results from last 7 days   Lab Units 01/02/21  0529 01/01/21  0625 12/31/20  1325   SODIUM mmol/L 143 135* 137   POTASSIUM mmol/L 3 9 4 1 3 3*   CHLORIDE mmol/L 109* 102 101   CO2 mmol/L 21 23 27   BUN mg/dL 23 18 19   CREATININE mg/dL 0 92 1 02 1 19   CALCIUM mg/dL 7 7* 8 0* 8 2*

## 2021-01-02 NOTE — INCIDENTAL FINDINGS
The following findings require follow up:  Radiographic finding   Finding:  CTA of the chest/PE protocol (12/31/2020):  Mediastinal and hilar lymphadenopathy, likely reactive  A follow-up chest CT in 3 months is recommended to ensure resolution      Follow up required: Yes   Follow up should be done within 3 month(s)    Please notify the following clinician to assist with the follow up:   Rodolfo Bales (PCP) and needs an outpatient follow-up with Pulmonology

## 2021-01-02 NOTE — ASSESSMENT & PLAN NOTE
· CTA of the chest/PE protocol (12/31/2020):  Mediastinal and hilar lymphadenopathy, likely reactive  A follow-up chest CT in 3 months is recommended to ensure resolution  · Outpatient follow-up with Pulmonology  · Will need an outpatient Hematology/Oncology evaluation if the lymphadenopathy persists on follow-up imaging    Results for Lamona Fix (MRN [id number]) as of 1/2/2021 12:13   Ref   Range 1/2/2021 05:29   LD (LDH) Latest Ref Range: 81 - 234 U/L 486 (H)

## 2021-01-02 NOTE — ASSESSMENT & PLAN NOTE
· Continue her home dose PO levothyroxine  · Recheck a TSH level in 1-2 weeks with her PCP    Results for Dee Wall (MRN [id number]) as of 1/1/2021 11:36   Ref   Range 1/1/2021 06:25   TSH 3RD GENERATON Latest Ref Range: 0 358 - 3 740 uIU/mL 0 217 (L)

## 2021-01-02 NOTE — ASSESSMENT & PLAN NOTE
· Resolved with potassium chloride 40 meQ supplementation treatment  · Follow the potassium level and magnesium level    Results from last 7 days   Lab Units 01/02/21  0529 01/01/21  0625 12/31/20  1325   SODIUM mmol/L 143 135* 137   POTASSIUM mmol/L 3 9 4 1 3 3*   CHLORIDE mmol/L 109* 102 101   CO2 mmol/L 21 23 27   BUN mg/dL 23 18 19   CREATININE mg/dL 0 92 1 02 1 19   CALCIUM mg/dL 7 7* 8 0* 8 2*

## 2021-01-02 NOTE — PROGRESS NOTES
Progress Note - Ignacio Morrison August 1945, 67 y o  female MRN: [id number]    Unit/Bed#: 401-01 Encounter: [id number]    Primary Care Provider: DANIELLA Cruz   Date and time admitted to hospital: 12/31/2020  1:12 PM        * Pneumonia due to COVID-19 virus  Assessment & Plan  · Required 3 lpm of continuous supplemental oxygen to maintain oxygen saturation levels at 92% and above at the time of admission  · Currently requiring 1 5 lpm of continuous supplemental oxygen to maintain oxygen saturation levels at 92% and above  · Received remdesivir 200 mg IV x 1 dose on 12/31/2020 and continue remdesivir 100 mg IV every 24 hours (day #3) to complete the 5-day treatment course  · Continue dexamethason 6 mg IV every 24 hours (day #3)  · Convalescent plasma has been ordered on 01/01/2021  · Utilize ceftriaxone 1000 mg IV every 24 hours and doxycycline 100 mg IV every 12 hours  · Follow the procalcitonin level to see if continued antibiotic therapy is indicated  · Utilize ascorbic acid 1000 mg PO every 12 hours, cholecalciferol 2000 I  U  PO Qdaily, and zinc sulfate 220 mg PO Qdaily  · Hold high-intensity statin therapy at this time secondary to transaminitis  · Utilize famotidine 20 mg PO Qdaily  · With the patient having bilateral pulmonary emboli, initially treated with an IV heparin drip/infusion per the VTE/PE scale (Raschke protocol), which was changed to lovenox 1 mg/kg SQ every 12 hours on 01/02/2021  · Follow inflammatory markers with a daily D-dimer level, a daily CRP level, and a daily ferritin level    Bilateral pulmonary embolism (HCC)  Assessment & Plan  · Likely secondary to COVID-19 pneumonia  · Change the IV heparin drip/infusion per the VTE/PE scale (Raschke protocol) to lovenox 1 mg/kg SQ every 12 hours  · Check a transthoracic echocardiogram to evaluate the patient for right heart strain  · Consult Pulmonology    Hyperglycemia  Assessment & Plan  · Secondary to IV dexamethasone treatment  · Check a hemoglobin A1c level  · Follow the blood glucose trend    Hyperchloremia  Assessment & Plan  · Discontinue the NSS IV fluids  · Follow the chloride level    Low TSH level  Assessment & Plan  · Continue her home dose PO levothyroxine  · Recheck a TSH level in 1-2 weeks with her PCP    Results for Billy Bennett (MRN [id number]) as of 1/1/2021 11:36   Ref  Range 1/1/2021 06:25   TSH 3RD GENERATON Latest Ref Range: 0 358 - 3 740 uIU/mL 0 217 (L)       Hyponatremia  Assessment & Plan  · Mild hyponatremia  · Likely hypovolemic hyponatremia  · Resolved  · Discontinue the NSS IV fluids on 01/02/2021 in the setting of hyperchloremia  · Follow the sodium level    Results from last 7 days   Lab Units 01/02/21  0529 01/01/21  0625 12/31/20  1325   SODIUM mmol/L 143 135* 137   POTASSIUM mmol/L 3 9 4 1 3 3*   CHLORIDE mmol/L 109* 102 101   CO2 mmol/L 21 23 27   BUN mg/dL 23 18 19   CREATININE mg/dL 0 92 1 02 1 19   CALCIUM mg/dL 7 7* 8 0* 8 2*         Transaminitis  Assessment & Plan  · Hold statin therapy for now  · The transaminitis has improved  · Avoid all hepatotoxic agents  · Follow the liver function tests    Results from last 7 days   Lab Units 01/02/21  0529 01/01/21  0625 12/31/20  1325   SODIUM mmol/L 143 135* 137   POTASSIUM mmol/L 3 9 4 1 3 3*   CHLORIDE mmol/L 109* 102 101   CO2 mmol/L 21 23 27   BUN mg/dL 23 18 19   CREATININE mg/dL 0 92 1 02 1 19   CALCIUM mg/dL 7 7* 8 0* 8 2*   ALK PHOS U/L 158* 199* 208*   ALT U/L 35 40 46   AST U/L 43 57* 55*     Results for Billy Bennett (MRN [id number]) as of 1/2/2021 12:13   Ref   Range 1/1/2021 06:25   HEPATITIS A IGM ANTIBODY Latest Ref Range: Non-reactive, Equivocal-Suggest Recollect  Non-reactive   HEPATITIS B SURFACE ANTIGEN Latest Ref Range: Non-reactive, NonReactive - Confirmed  Non-reactive   HEPATITIS B CORE IGM ANTIBODY Latest Ref Range: Non-reactive  Non-reactive   HEPATITIS C ANTIBODY Latest Ref Range: Non-reactive  Non-reactive Hypokalemia  Assessment & Plan  · Resolved with potassium chloride supplementation treatment  · Follow the potassium level and magnesium level    Results from last 7 days   Lab Units 01/02/21  0529 01/01/21  0625 12/31/20  1325   SODIUM mmol/L 143 135* 137   POTASSIUM mmol/L 3 9 4 1 3 3*   CHLORIDE mmol/L 109* 102 101   CO2 mmol/L 21 23 27   BUN mg/dL 23 18 19   CREATININE mg/dL 0 92 1 02 1 19   CALCIUM mg/dL 7 7* 8 0* 8 2*         Acquired hypothyroidism  Assessment & Plan  · Continue her home dose PO levothyroxine  · Recheck a TSH level in 1-2 weeks with her PCP    Results for Mal King (MRN [id number]) as of 1/1/2021 11:36   Ref  Range 1/1/2021 06:25   TSH 3RD GENERATON Latest Ref Range: 0 358 - 3 740 uIU/mL 0 217 (L)       Lymphadenopathy  Assessment & Plan  · CTA of the chest/PE protocol (12/31/2020):  Mediastinal and hilar lymphadenopathy, likely reactive  A follow-up chest CT in 3 months is recommended to ensure resolution  · Outpatient follow-up with Pulmonology  · Will need an outpatient Hematology/Oncology evaluation if the lymphadenopathy persists on follow-up imaging    Results for Mal King (MRN [id number]) as of 1/2/2021 12:13   Ref   Range 1/2/2021 05:29   LD (LDH) Latest Ref Range: 81 - 234 U/L 486 (H)       Hypercholesterolemia  Assessment & Plan  · Hold statin therapy at this time in the setting of transaminitis    Essential hypertension  Assessment & Plan  · Continue PO amlodipine and PO losartan  · Follow the blood pressure trend    Obstructive sleep apnea  Assessment & Plan  · Has probable obstructive sleep apnea per her PCP  · Was instructed to have a sleep study completed, which the patient has not yet done  · Needs an outpatient sleep study completed as soon as possible    Elevated alkaline phosphatase level  Assessment & Plan  · Secondary to unclear etiology  · Follow the total alkaline phosphatase level        VTE Pharmacologic Prophylaxis:   Pharmacologic: Enoxaparin (Lovenox) 1 mg/kg SQ every 12 hours  Mechanical VTE Prophylaxis in Place: No SCD's with a possible DVT of the lower extremities    Patient Centered Rounds: I have performed bedside rounds with nursing staff today  Time Spent for Care: 30 minutes  More than 50% of total time spent on counseling and coordination of care as described above  Current Length of Stay: 2 day(s)    Current Patient Status: Inpatient   Certification Statement: The patient will continue to require additional inpatient hospital stay due to the need for anticoagulation to treat the bilateral pulmonary emboli, for IV remdesivir treatment, for IV dexamethasone treatment, and with the patient requiring supplemental oxygen to maintain adequate oxygen saturation levels  Code Status: Level 1 - Full Code      Subjective: The patient was seen and examined  The patient is doing better  The shortness of breath is improving  The generalized weakness is also improving  No chest pain  Objective:     Vitals:   Temp (24hrs), Av 9 °F (36 6 °C), Min:97 3 °F (36 3 °C), Max:98 2 °F (36 8 °C)    Temp:  [97 3 °F (36 3 °C)-98 2 °F (36 8 °C)] 97 3 °F (36 3 °C)  HR:  [64-82] 81  Resp:  [18] 18  BP: (127-144)/(52-65) 144/65  SpO2:  [92 %-95 %] 94 %  Body mass index is 40 34 kg/m²  Input and Output Summary (last 24 hours):        Intake/Output Summary (Last 24 hours) at 2021 1236  Last data filed at 2021 1110  Gross per 24 hour   Intake 2723 78 ml   Output 1200 ml   Net 1523 78 ml       Physical Exam:     Physical Exam  General:  NAD, follows commands  HEENT:  NC/AT, mucous membranes moist  Neck:  Supple, No JVP elevation  CV:  + S1, + S2, RRR  Pulm:  Bibasilar crackles  Abd:  Soft, Non-tender, Non-distended  Ext:  No clubbing/cyanosis/edema  Skin:  No rashes  Neuro:  Awake, alert, oriented  Psych:  Normal mood and affect      Additional Data:    Labs:    Results from last 7 days   Lab Units 21  0529   WBC Thousand/uL 5 76 HEMOGLOBIN g/dL 10 9*   HEMATOCRIT % 34 5*   PLATELETS Thousands/uL 220   NEUTROS PCT % 82*   LYMPHS PCT % 15   MONOS PCT % 3*   EOS PCT % 0     Results from last 7 days   Lab Units 01/02/21  0529   SODIUM mmol/L 143   POTASSIUM mmol/L 3 9   CHLORIDE mmol/L 109*   CO2 mmol/L 21   BUN mg/dL 23   CREATININE mg/dL 0 92   ANION GAP mmol/L 13   CALCIUM mg/dL 7 7*   ALBUMIN g/dL 2 6*   TOTAL BILIRUBIN mg/dL 0 30   ALK PHOS U/L 158*   ALT U/L 35   AST U/L 43   GLUCOSE RANDOM mg/dL 166*             Results from last 7 days   Lab Units 01/01/21  0625   HEMOGLOBIN A1C % 5 8*     Results from last 7 days   Lab Units 01/01/21  0625 12/31/20  1325   PROCALCITONIN ng/ml 0 10 0 09           * I Have Reviewed All Lab Data Listed Above  * Additional Pertinent Lab Tests Reviewed:  [facility]      Recent Cultures (last 7 days):           Last 24 Hours Medication List:   Current Facility-Administered Medications   Medication Dose Route Frequency Provider Last Rate    albuterol  2 puff Inhalation Q4H PRN [employer], DO      amLODIPine  2 5 mg Oral Daily [employer], DO      ascorbic acid  1,000 mg Oral Q12H [facility] [employer], DO      cefTRIAXone  1,000 mg Intravenous Q24H [employer], DO 1,000 mg (01/01/21 1938)    cholecalciferol  2,000 Units Oral Daily [employer], DO      dexamethasone  6 mg Intravenous Q24H [employer], DO      doxycycline  100 mg Intravenous Q12H [employer],  mg (01/02/21 1110)    enoxaparin  1 mg/kg Subcutaneous Q12H [facility] Bryant Zarate, DO      famotidine  20 mg Oral Daily [employer], DO      gabapentin  100 mg Oral BID [employer], DO      levothyroxine  100 mcg Oral Early Morning [employer], DO      loratadine  10 mg Oral Daily [employer], DO      losartan  25 mg Oral Daily [employer], DO      montelukast  10 mg Oral Daily [employer], DO      zinc sulfate  220 mg Oral Daily Flaco Rosenbaum DO      Followed by   Stefanie Murphy ON 1/8/2021] multivitamin-minerals  1 tablet Oral Daily Flaco Rosenbaum DO      remdesivir  100 mg Intravenous Q24H [employer] Rosenbaum DO          Today, Patient Was Seen By: Flaco Rosenbaum DO    ** Please Note: Dictation voice to text software may have been used in the creation of this document   **

## 2021-01-02 NOTE — ASSESSMENT & PLAN NOTE
· Continue her home dose PO levothyroxine  · Recheck a TSH level in 1-2 weeks with her PCP    Results for Bharathi Duarte (MRN [id number]) as of 1/1/2021 11:36   Ref   Range 1/1/2021 06:25   TSH 3RD GENERATON Latest Ref Range: 0 358 - 3 740 uIU/mL 0 217 (L)

## 2021-01-02 NOTE — CASE MANAGEMENT
Cm called and spoke with the patient to evaluate the patients prior function and living situation and any barriers to d/c and form a safe d/c plan  Cm also evaluated the patient for any services in the home or needs for services  Pt resides at home with her  in an apartment in the [facility]  Pt had been independent with her adls/ambulation  No services or DME  Hx of OP therapy at [facility]  Pt drives  PCP is Tracey Valentine and pharmacy is [facility] Plans at this time are home on dc with OP follow up (Virtual as pt is COVID positive)  Family to transport pt home on dc

## 2021-01-02 NOTE — RESPIRATORY THERAPY NOTE
RT Protocol Note  [facility] August 75 y o  female MRN: [id number]  Unit/Bed#: 401-01 Encounter: [id number]    Assessment    Principal Problem:    Pneumonia due to COVID-19 virus  Active Problems:    Elevated alkaline phosphatase level    Obstructive sleep apnea    Vitamin D deficiency    Essential hypertension    Bilateral pulmonary embolism (HCC)    Hypercholesterolemia    Lymphadenopathy    Acquired hypothyroidism    Hypokalemia    Transaminitis    Hyponatremia    Low TSH level      Home Pulmonary Medications:  none       Past Medical History:   Diagnosis Date    Allergic     Hypertension     last assessed 5/4/15     Hypothyroid      Social History     Socioeconomic History    Marital status: /Civil Union     Spouse name: None    Number of children: None    Years of education: None    Highest education level: None   Occupational History    Occupation: retired     Occupation:     Social Needs    Financial resource strain: None    Food insecurity     Worry: None     Inability: None    Transportation needs     Medical: None     Non-medical: None   Tobacco Use    Smoking status: Never Smoker    Smokeless tobacco: Never Used   Substance and Sexual Activity    Alcohol use: Not Currently     Alcohol/week: 0 0 standard drinks     Frequency: Never    Drug use: No    Sexual activity: None   Lifestyle    Physical activity     Days per week: None     Minutes per session: None    Stress: None   Relationships    Social connections     Talks on phone: None     Gets together: None     Attends Yazidi service: None     Active member of club or organization: None     Attends meetings of clubs or organizations: None     Relationship status: None    Intimate partner violence     Fear of current or ex partner: None     Emotionally abused: None     Physically abused: None     Forced sexual activity: None   Other Topics Concern    None   Social History Narrative    Lives with friend / boyfriend Subjective         Objective    Physical Exam:   Assessment Type: Assess only  General Appearance: Alert, Awake  Respiratory Pattern: Spontaneous, Dyspnea with exertion  Chest Assessment: Chest expansion symmetrical  Bilateral Breath Sounds: Diminished    Vitals:  Blood pressure 144/65, pulse 70, temperature (!) 97 3 °F (36 3 °C), resp  rate 18, height 5' 4" (1 626 m), weight 107 kg (235 lb 0 2 oz), SpO2 94 %  Imaging and other studies: I have personally reviewed pertinent reports  Plan    Respiratory Plan: Mild Distress pathway          QID MDI changed to [id number]

## 2021-01-02 NOTE — ASSESSMENT & PLAN NOTE
· Hold statin therapy for now  · The transaminitis has improved  · Avoid all hepatotoxic agents  · Follow the liver function tests    Results from last 7 days   Lab Units 01/02/21  0529 01/01/21  0625 12/31/20  1325   SODIUM mmol/L 143 135* 137   POTASSIUM mmol/L 3 9 4 1 3 3*   CHLORIDE mmol/L 109* 102 101   CO2 mmol/L 21 23 27   BUN mg/dL 23 18 19   CREATININE mg/dL 0 92 1 02 1 19   CALCIUM mg/dL 7 7* 8 0* 8 2*   ALK PHOS U/L 158* 199* 208*   ALT U/L 35 40 46   AST U/L 43 57* 55*     Results for Gabby Patrick (MRN [id number]) as of 1/2/2021 12:13   Ref   Range 1/1/2021 06:25   HEPATITIS A IGM ANTIBODY Latest Ref Range: Non-reactive, Equivocal-Suggest Recollect  Non-reactive   HEPATITIS B SURFACE ANTIGEN Latest Ref Range: Non-reactive, NonReactive - Confirmed  Non-reactive   HEPATITIS B CORE IGM ANTIBODY Latest Ref Range: Non-reactive  Non-reactive   HEPATITIS C ANTIBODY Latest Ref Range: Non-reactive  Non-reactive

## 2021-01-03 LAB
ABO GROUP BLD BPU: NORMAL
ALBUMIN SERPL BCP-MCNC: 2.8 G/DL (ref 3.5–5)
ALP SERPL-CCNC: 155 U/L (ref 46–116)
ALT SERPL W P-5'-P-CCNC: 35 U/L (ref 12–78)
ANION GAP SERPL CALCULATED.3IONS-SCNC: 12 MMOL/L (ref 4–13)
AST SERPL W P-5'-P-CCNC: 39 U/L (ref 5–45)
BASOPHILS # BLD AUTO: 0.01 THOUSANDS/ΜL (ref 0–0.1)
BASOPHILS NFR BLD AUTO: 0 % (ref 0–1)
BILIRUB SERPL-MCNC: 0.4 MG/DL (ref 0.2–1)
BPU ID: NORMAL
BUN SERPL-MCNC: 26 MG/DL (ref 5–25)
CALCIUM ALBUM COR SERPL-MCNC: 9.1 MG/DL (ref 8.3–10.1)
CALCIUM SERPL-MCNC: 8.1 MG/DL (ref 8.3–10.1)
CHLORIDE SERPL-SCNC: 106 MMOL/L (ref 100–108)
CO2 SERPL-SCNC: 21 MMOL/L (ref 21–32)
CREAT SERPL-MCNC: 0.98 MG/DL (ref 0.6–1.3)
CRP SERPL QL: 19.7 MG/L
D DIMER PPP FEU-MCNC: 2.19 UG/ML FEU
EOSINOPHIL # BLD AUTO: 0 THOUSAND/ΜL (ref 0–0.61)
EOSINOPHIL NFR BLD AUTO: 0 % (ref 0–6)
ERYTHROCYTE [DISTWIDTH] IN BLOOD BY AUTOMATED COUNT: 14.4 % (ref 11.6–15.1)
EST. AVERAGE GLUCOSE BLD GHB EST-MCNC: 126 MG/DL
FERRITIN SERPL-MCNC: 101 NG/ML (ref 8–388)
GFR SERPL CREATININE-BSD FRML MDRD: 56 ML/MIN/1.73SQ M
GLUCOSE SERPL-MCNC: 165 MG/DL (ref 65–140)
HBA1C MFR BLD: 6 %
HCT VFR BLD AUTO: 35.3 % (ref 34.8–46.1)
HGB BLD-MCNC: 11.3 G/DL (ref 11.5–15.4)
IMM GRANULOCYTES # BLD AUTO: 0.05 THOUSAND/UL (ref 0–0.2)
IMM GRANULOCYTES NFR BLD AUTO: 1 % (ref 0–2)
LYMPHOCYTES # BLD AUTO: 0.88 THOUSANDS/ΜL (ref 0.6–4.47)
LYMPHOCYTES NFR BLD AUTO: 12 % (ref 14–44)
MAGNESIUM SERPL-MCNC: 2 MG/DL (ref 1.6–2.6)
MCH RBC QN AUTO: 28.6 PG (ref 26.8–34.3)
MCHC RBC AUTO-ENTMCNC: 32 G/DL (ref 31.4–37.4)
MCV RBC AUTO: 89 FL (ref 82–98)
MONOCYTES # BLD AUTO: 0.28 THOUSAND/ΜL (ref 0.17–1.22)
MONOCYTES NFR BLD AUTO: 4 % (ref 4–12)
NEUTROPHILS # BLD AUTO: 6.11 THOUSANDS/ΜL (ref 1.85–7.62)
NEUTS SEG NFR BLD AUTO: 83 % (ref 43–75)
NRBC BLD AUTO-RTO: 0 /100 WBCS
PHOSPHATE SERPL-MCNC: 3.7 MG/DL (ref 2.3–4.1)
PLATELET # BLD AUTO: 233 THOUSANDS/UL (ref 149–390)
PMV BLD AUTO: 11.2 FL (ref 8.9–12.7)
POTASSIUM SERPL-SCNC: 3.8 MMOL/L (ref 3.5–5.3)
PROCALCITONIN SERPL-MCNC: 0.07 NG/ML
PROT SERPL-MCNC: 6.4 G/DL (ref 6.4–8.2)
RBC # BLD AUTO: 3.95 MILLION/UL (ref 3.81–5.12)
SODIUM SERPL-SCNC: 139 MMOL/L (ref 136–145)
UNIT DISPENSE STATUS: NORMAL
UNIT PRODUCT CODE: NORMAL
UNIT RH: NORMAL
WBC # BLD AUTO: 7.33 THOUSAND/UL (ref 4.31–10.16)

## 2021-01-03 PROCEDURE — 97535 SELF CARE MNGMENT TRAINING: CPT

## 2021-01-03 PROCEDURE — 85379 FIBRIN DEGRADATION QUANT: CPT | Performed by: INTERNAL MEDICINE

## 2021-01-03 PROCEDURE — 82728 ASSAY OF FERRITIN: CPT | Performed by: INTERNAL MEDICINE

## 2021-01-03 PROCEDURE — 80053 COMPREHEN METABOLIC PANEL: CPT | Performed by: INTERNAL MEDICINE

## 2021-01-03 PROCEDURE — 94760 N-INVAS EAR/PLS OXIMETRY 1: CPT

## 2021-01-03 PROCEDURE — 84100 ASSAY OF PHOSPHORUS: CPT | Performed by: INTERNAL MEDICINE

## 2021-01-03 PROCEDURE — 86140 C-REACTIVE PROTEIN: CPT | Performed by: INTERNAL MEDICINE

## 2021-01-03 PROCEDURE — 83036 HEMOGLOBIN GLYCOSYLATED A1C: CPT | Performed by: INTERNAL MEDICINE

## 2021-01-03 PROCEDURE — 85025 COMPLETE CBC W/AUTO DIFF WBC: CPT | Performed by: INTERNAL MEDICINE

## 2021-01-03 PROCEDURE — 97167 OT EVAL HIGH COMPLEX 60 MIN: CPT

## 2021-01-03 PROCEDURE — 84145 PROCALCITONIN (PCT): CPT | Performed by: INTERNAL MEDICINE

## 2021-01-03 PROCEDURE — 99232 SBSQ HOSP IP/OBS MODERATE 35: CPT | Performed by: INTERNAL MEDICINE

## 2021-01-03 PROCEDURE — 83735 ASSAY OF MAGNESIUM: CPT | Performed by: INTERNAL MEDICINE

## 2021-01-03 RX ADMIN — DEXAMETHASONE SODIUM PHOSPHATE 6 MG: 4 INJECTION, SOLUTION INTRA-ARTICULAR; INTRALESIONAL; INTRAMUSCULAR; INTRAVENOUS; SOFT TISSUE at 18:40

## 2021-01-03 RX ADMIN — OXYCODONE HYDROCHLORIDE AND ACETAMINOPHEN 1000 MG: 500 TABLET ORAL at 08:28

## 2021-01-03 RX ADMIN — GABAPENTIN 100 MG: 100 CAPSULE ORAL at 18:40

## 2021-01-03 RX ADMIN — LOSARTAN POTASSIUM 25 MG: 25 TABLET, FILM COATED ORAL at 08:28

## 2021-01-03 RX ADMIN — LORATADINE 10 MG: 10 TABLET ORAL at 08:28

## 2021-01-03 RX ADMIN — LEVOTHYROXINE SODIUM 100 MCG: 100 TABLET ORAL at 05:07

## 2021-01-03 RX ADMIN — ZINC SULFATE 220 MG (50 MG) CAPSULE 220 MG: CAPSULE at 08:28

## 2021-01-03 RX ADMIN — REMDESIVIR 100 MG: 100 INJECTION, POWDER, LYOPHILIZED, FOR SOLUTION INTRAVENOUS at 18:41

## 2021-01-03 RX ADMIN — FAMOTIDINE 20 MG: 20 TABLET, FILM COATED ORAL at 08:28

## 2021-01-03 RX ADMIN — Medication 2000 UNITS: at 08:28

## 2021-01-03 RX ADMIN — AMLODIPINE BESYLATE 2.5 MG: 2.5 TABLET ORAL at 08:28

## 2021-01-03 RX ADMIN — GABAPENTIN 100 MG: 100 CAPSULE ORAL at 08:28

## 2021-01-03 RX ADMIN — ENOXAPARIN SODIUM 105 MG: 120 INJECTION SUBCUTANEOUS at 08:27

## 2021-01-03 RX ADMIN — MONTELUKAST 10 MG: 10 TABLET, FILM COATED ORAL at 08:28

## 2021-01-03 RX ADMIN — ENOXAPARIN SODIUM 105 MG: 120 INJECTION SUBCUTANEOUS at 20:39

## 2021-01-03 RX ADMIN — OXYCODONE HYDROCHLORIDE AND ACETAMINOPHEN 1000 MG: 500 TABLET ORAL at 20:39

## 2021-01-03 NOTE — ASSESSMENT & PLAN NOTE
· Mild hyponatremia  · Likely hypovolemic hyponatremia  · Resolved  · The NSS IV fluids were discontinued on 01/02/2021 in the setting of hyperchloremia    · Follow the sodium level    Results from last 7 days   Lab Units 01/03/21  0519 01/02/21  0529 01/01/21  0625   SODIUM mmol/L 139 143 135*   POTASSIUM mmol/L 3 8 3 9 4 1   CHLORIDE mmol/L 106 109* 102   CO2 mmol/L 21 21 23   BUN mg/dL 26* 23 18   CREATININE mg/dL 0 98 0 92 1 02   CALCIUM mg/dL 8 1* 7 7* 8 0*

## 2021-01-03 NOTE — OCCUPATIONAL THERAPY NOTE
Occupational Therapy Evaluation     Patient Name: Rodger Blanca August Today's Date: 1/3/2021  Problem List  Principal Problem:    Pneumonia due to COVID-19 virus  Active Problems:    Elevated alkaline phosphatase level    Obstructive sleep apnea    Essential hypertension    Bilateral pulmonary embolism (HCC)    Hypercholesterolemia    Lymphadenopathy    Acquired hypothyroidism    Hypokalemia    Transaminitis    Hyponatremia    Low TSH level    Hyperchloremia    Hyperglycemia    Past Medical History  Past Medical History:   Diagnosis Date    Allergic     Hypertension     last assessed 5/4/15     Hypothyroid      Past Surgical History  Past Surgical History:   Procedure Laterality Date    DILATION AND CURETTAGE OF UTERUS      GALLBLADDER SURGERY             01/03/21 1340   OT Last Visit   OT Visit Date 01/03/21   Note Type   Note type Evaluation   Restrictions/Precautions   Weight Bearing Precautions Per Order No   Other Precautions Telemetry; Fall Risk   Pain Assessment   Pain Assessment Tool Pain Assessment not indicated - pt denies pain   Home Living   Type of [facility] One level;Performs ADLs on one level; Able to live on main level with bedroom/bathroom   Bathroom Shower/Tub Tub/shower unit   Bathroom Toilet Standard   Bathroom Equipment Grab bars in shower   [address] Other (Comment)  (no DME at baseline )   Additional Comments pt reports no device during mobility at baseline   Psychosocial   Psychosocial (WDL) X   Patient Behaviors/Mood Anxious;Pleasant   Subjective   Subjective "could you just empty the commode?"   ADL   Where Assessed Other (Comment)  (BSC)   LB Bathing Assistance 5  Supervision/Setup   LB Dressing Assistance 5  Supervision/Setup   Toileting Assistance  5  Supervision/Setup   Additional Comments pt performed toileting tasks and LB bathing during session; pt with (S) and no LOB in standing    Bed Mobility   Additional Comments pt seated on BSC at start of session and in chair at end of session; pt on RA during session and SpO2 ranged 90-95% during functional activities   Transfers   Sit to Stand 5  Supervision   Stand to Sit 5  Supervision   Toilet transfer 5  Supervision   Additional Comments no device; no LOB or instability   Functional Mobility   Functional Mobility 5  Supervision   Additional Comments pt performed functional mobility in room ~20ft with moderate SOB and requires seated rest break; pt's SpO2 WFL, however pt with audible SOB   Additional items   (no device)   Balance   Static Sitting Good   Dynamic Sitting Good   Static Standing Good   Dynamic Standing Fair +   Ambulatory Fair +   Activity Tolerance   Activity Tolerance Patient limited by fatigue   RUE Assessment   RUE Assessment WFL   LUE Assessment   LUE Assessment WFL   Cognition   Overall Cognitive Status WFL   Arousal/Participation Alert   Attention Within functional limits   Orientation Level Oriented X4   Memory Within functional limits   Following Commands Follows all commands and directions without difficulty   Assessment   Limitation Decreased ADL status; Decreased UE strength;Decreased endurance;Decreased self-care trans;Decreased high-level ADLs   Assessment Pt is a 67 y o  female seen for OT evaluation s/p admit to [facility] on 12/31/2020 w/ Pneumonia due to COVID-19 virus  Comorbidities affecting pt's functional performance at time of assessment include: HTN, allergies, hypothyroid  Personal factors affecting pt at time of IE include:behavioral pattern, difficulty performing ADLS, difficulty performing IADLS , limited insight into deficits, compliance, decreased initiation and engagement  and health management   Prior to admission, pt was (I) with ADLs and IADLs with no device during mobility   Upon evaluation: Pt requires (S) level with no device during mobility 2* the following deficits impacting occupational performance: weakness, decreased strength, decreased balance, decreased tolerance, impaired initiation, impaired problem solving, decreased safety awareness and impaired interpersonal skills  Pt to benefit from continued skilled OT tx while in the hospital to address deficits as defined above and maximize level of functional independence w ADL's and functional mobility  Occupational Performance areas to address include: grooming, bathing/shower, toilet hygiene, dressing, functional mobility, community mobility and clothing management  From OT standpoint, recommendation at time of d/c would be home health services  Goals   Patient Goals to go home    Short Term Goal  pt will perform UE strengthening exercises    Long Term Goal #1 pt will demonstrate toilet transfers and hygiene at (I) level   Long Term Goal #2 pt will demonstrate UB/LB bathing and grooming tasks at (I) level   Long Term Goal pt will demonstrate functional mobility with no device at (I) level   Plan   Treatment Interventions ADL retraining;Functional transfer training;UE strengthening/ROM; Endurance training;Patient/family training;Equipment evaluation/education; Activityengagement   Goal Expiration Date 01/17/21   OT Frequency 3-5x/wk   Recommendation   OT Discharge Recommendation Home with skilled therapy     Pt will benefit from continued OT services in order to maximize (I) c ADL performance, FM c no device, and improve overall endurance/strength required to complete functional tasks in preparation for d/c  Pt left seated in chair at end of session; all needs within reach; all lines intact

## 2021-01-03 NOTE — PROGRESS NOTES
Progress Note - Trudy Sánchez August 1945, 67 y o  female MRN: [id number]    Unit/Bed#: 401-01 Encounter: [id number]    Primary Care Provider: DANIELLA Prabhakar   Date and time admitted to hospital: 12/31/2020  1:12 PM        * Pneumonia due to COVID-19 virus  Assessment & Plan  · Required 3 lpm of continuous supplemental oxygen to maintain oxygen saturation levels at 92% and above at the time of admission  · The patient has been successfully weaned off supplemental oxygen on 01/03/2021  · Received remdesivir 200 mg IV x 1 dose on 12/31/2020 and continue remdesivir 100 mg IV every 24 hours (day #4) to complete the 5-day treatment course  · Continue dexamethason 6 mg IV every 24 hours (day #4)  · Convalescent plasma has been ordered on 01/01/2021  · Initially treated with ceftriaxone 1000 mg IV every 24 hours and doxycycline 100 mg IV every 12 hours, which will be discontinued at this time with multiple normal procalcitonin levels  · Utilize ascorbic acid 1000 mg PO every 12 hours, cholecalciferol 2000 I  U  PO Qdaily, and zinc sulfate 220 mg PO Qdaily  · Hold high-intensity statin therapy at this time secondary to transaminitis  · Utilize famotidine 20 mg PO Qdaily  · With the patient having bilateral pulmonary emboli, initially treated with an IV heparin drip/infusion per the VTE/PE scale (Raschke protocol), which was changed to lovenox 1 mg/kg SQ every 12 hours on 01/02/2021  · Follow inflammatory markers with a daily D-dimer level, a daily CRP level, and a daily ferritin level    Bilateral pulmonary embolism (HCC)  Assessment & Plan  · Likely secondary to COVID-19 pneumonia  · Initially treated with an IV heparin drip/infusion per the VTE/PE scale (Raschke protocol), which was changed to lovenox 1 mg/kg SQ every 12 hours on 01/02/2021  · Check a transthoracic echocardiogram to evaluate the patient for right heart strain  · Consult Pulmonology    Hyperglycemia  Assessment & Plan  · Secondary to IV dexamethasone treatment  · Follow the blood glucose trend  Outpatient follow-up with PCP in regards to this matter    Results for Jeffrey Fall (MRN [id number]) as of 1/3/2021 11:59   Ref  Range 1/1/2021 06:25   Hemoglobin A1C Latest Ref Range: Normal 3 8-5 6%; PreDiabetic 5 7-6 4%; Diabetic >=6 5%; Glycemic control for adults with diabetes <7 0% % 5 8 (H)   eAG, EST AVG Glucose Latest Units: mg/dl 120       Hyperchloremia  Assessment & Plan  · Resolved  · The NSS IV fluids were discontinued on 01/02/2021  · Follow the chloride level    Low TSH level  Assessment & Plan  · Continue her home dose PO levothyroxine  · Recheck a TSH level in 1-2 weeks with her PCP    Results for Jeffrey Fall (MRN [id number]) as of 1/1/2021 11:36   Ref  Range 1/1/2021 06:25   TSH 3RD GENERATON Latest Ref Range: 0 358 - 3 740 uIU/mL 0 217 (L)       Hyponatremia  Assessment & Plan  · Mild hyponatremia  · Likely hypovolemic hyponatremia  · Resolved  · The NSS IV fluids were discontinued on 01/02/2021 in the setting of hyperchloremia  · Follow the sodium level    Results from last 7 days   Lab Units 01/03/21  0519 01/02/21  0529 01/01/21  0625   SODIUM mmol/L 139 143 135*   POTASSIUM mmol/L 3 8 3 9 4 1   CHLORIDE mmol/L 106 109* 102   CO2 mmol/L 21 21 23   BUN mg/dL 26* 23 18   CREATININE mg/dL 0 98 0 92 1 02   CALCIUM mg/dL 8 1* 7 7* 8 0*         Transaminitis  Assessment & Plan  · Hold statin therapy for now  · The transaminitis has improved  · Avoid all hepatotoxic agents  · Follow the liver function tests    Results from last 7 days   Lab Units 01/03/21  0519 01/02/21  0529 01/01/21  0625   SODIUM mmol/L 139 143 135*   POTASSIUM mmol/L 3 8 3 9 4 1   CHLORIDE mmol/L 106 109* 102   CO2 mmol/L 21 21 23   BUN mg/dL 26* 23 18   CREATININE mg/dL 0 98 0 92 1 02   CALCIUM mg/dL 8 1* 7 7* 8 0*   ALK PHOS U/L 155* 158* 199*   ALT U/L 35 35 40   AST U/L 39 43 57*     Results for Jeffrey Juan David (MRN [id number]) as of 1/2/2021 12:13   Ref   Range 1/1/2021 06:25   HEPATITIS A IGM ANTIBODY Latest Ref Range: Non-reactive, Equivocal-Suggest Recollect  Non-reactive   HEPATITIS B SURFACE ANTIGEN Latest Ref Range: Non-reactive, NonReactive - Confirmed  Non-reactive   HEPATITIS B CORE IGM ANTIBODY Latest Ref Range: Non-reactive  Non-reactive   HEPATITIS C ANTIBODY Latest Ref Range: Non-reactive  Non-reactive       Hypokalemia  Assessment & Plan  · Resolved with potassium chloride supplementation treatment  · Follow the potassium level and magnesium level    Results from last 7 days   Lab Units 01/03/21  0519 01/02/21  0529 01/01/21  0625   SODIUM mmol/L 139 143 135*   POTASSIUM mmol/L 3 8 3 9 4 1   CHLORIDE mmol/L 106 109* 102   CO2 mmol/L 21 21 23   BUN mg/dL 26* 23 18   CREATININE mg/dL 0 98 0 92 1 02   CALCIUM mg/dL 8 1* 7 7* 8 0*         Acquired hypothyroidism  Assessment & Plan  · Continue her home dose PO levothyroxine  · Recheck a TSH level in 1-2 weeks with her PCP    Results for Demario Riley (MRN [id number]) as of 1/1/2021 11:36   Ref  Range 1/1/2021 06:25   TSH 3RD GENERATON Latest Ref Range: 0 358 - 3 740 uIU/mL 0 217 (L)       Lymphadenopathy  Assessment & Plan  · CTA of the chest/PE protocol (12/31/2020):  Mediastinal and hilar lymphadenopathy, likely reactive  A follow-up chest CT in 3 months is recommended to ensure resolution  · Outpatient follow-up with Pulmonology  · Will need an outpatient Hematology/Oncology evaluation if the lymphadenopathy persists on follow-up imaging    Results for Demario Riley (MRN [id number]) as of 1/2/2021 12:13   Ref   Range 1/2/2021 05:29   LD (LDH) Latest Ref Range: 81 - 234 U/L 486 (H)       Hypercholesterolemia  Assessment & Plan  · Hold statin therapy at this time in the setting of transaminitis    Essential hypertension  Assessment & Plan  · Continue PO amlodipine and PO losartan  · Follow the blood pressure trend    Obstructive sleep apnea  Assessment & Plan  · Has probable obstructive sleep apnea per her PCP  · Was instructed to have a sleep study completed, which the patient has not yet done  · Needs an outpatient sleep study completed as soon as possible    Elevated alkaline phosphatase level  Assessment & Plan  · Secondary to unclear etiology  · Follow the total alkaline phosphatase level        VTE Pharmacologic Prophylaxis:   Pharmacologic: Enoxaparin (Lovenox) 1 mg/kg SQ every 12 hours  Mechanical VTE Prophylaxis in Place: No SCD's until a DVT of the bilateral lower extremities is ruled-out    Patient Centered Rounds: I have performed bedside rounds with nursing staff today  Time Spent for Care: 30 minutes  More than 50% of total time spent on counseling and coordination of care as described above  Current Length of Stay: 3 day(s)    Current Patient Status: Inpatient   Certification Statement: The patient will continue to require additional inpatient hospital stay due to the need for IV remdesivir treatment, for IV dexamethasone treatment, and for close respiratory status monitoring  Code Status: Level 1 - Full Code      Subjective: The patient was seen and examined  The patient is doing better  The shortness of breath is much improved  No chest pain  Objective:     Vitals:   Temp (24hrs), Av 8 °F (36 6 °C), Min:97 6 °F (36 4 °C), Max:98 3 °F (36 8 °C)    Temp:  [97 6 °F (36 4 °C)-98 3 °F (36 8 °C)] 98 3 °F (36 8 °C)  HR:  [59-80] 68  Resp:  [18-20] 18  BP: (138-149)/(64-76) 149/68  SpO2:  [93 %-96 %] 95 %  Body mass index is 40 34 kg/m²  Input and Output Summary (last 24 hours):        Intake/Output Summary (Last 24 hours) at 1/3/2021 1213  Last data filed at 2021 1746  Gross per 24 hour   Intake 780 ml   Output 400 ml   Net 380 ml       Physical Exam:     Physical Exam  General:  NAD, follows commands  HEENT:  NC/AT, mucous membranes moist  Neck:  Supple, No JVP elevation  CV:  + S1, + S2, RRR  Pulm:  Scant bibasilar crackles  Abd:  Soft, Non-tender, Non-distended  Ext:  No clubbing/cyanosis/edema  Skin:  No rashes  Neuro:  Awake, alert, oriented  Psych:  Normal mood and affect      Additional Data:    Labs:    Results from last 7 days   Lab Units 01/03/21  0519   WBC Thousand/uL 7 33   HEMOGLOBIN g/dL 11 3*   HEMATOCRIT % 35 3   PLATELETS Thousands/uL 233   NEUTROS PCT % 83*   LYMPHS PCT % 12*   MONOS PCT % 4   EOS PCT % 0     Results from last 7 days   Lab Units 01/03/21  0519   SODIUM mmol/L 139   POTASSIUM mmol/L 3 8   CHLORIDE mmol/L 106   CO2 mmol/L 21   BUN mg/dL 26*   CREATININE mg/dL 0 98   ANION GAP mmol/L 12   CALCIUM mg/dL 8 1*   ALBUMIN g/dL 2 8*   TOTAL BILIRUBIN mg/dL 0 40   ALK PHOS U/L 155*   ALT U/L 35   AST U/L 39   GLUCOSE RANDOM mg/dL 165*             Results from last 7 days   Lab Units 01/01/21  0625   HEMOGLOBIN A1C % 5 8*     Results from last 7 days   Lab Units 01/02/21  0529 01/01/21  0625 12/31/20  1325   PROCALCITONIN ng/ml 0 09 0 10 0 09           * I Have Reviewed All Lab Data Listed Above  * Additional Pertinent Lab Tests Reviewed:  [facility]      Recent Cultures (last 7 days):           Last 24 Hours Medication List:   Current Facility-Administered Medications   Medication Dose Route Frequency Provider Last Rate    albuterol  2 puff Inhalation Q4H PRN Donel Savory, DO      amLODIPine  2 5 mg Oral Daily Donel Savory, DO      ascorbic acid  1,000 mg Oral Q12H [facility] Donel Savory, DO      cholecalciferol  2,000 Units Oral Daily Donel Savory, DO      dexamethasone  6 mg Intravenous Q24H Donel Savory, DO      enoxaparin  1 mg/kg Subcutaneous Q12H [facility] Bryant Zarate, DO      famotidine  20 mg Oral Daily Donel Savory, DO      gabapentin  100 mg Oral BID Donel Savory, DO      levothyroxine  100 mcg Oral Early Morning Donel Savory, DO      loratadine  10 mg Oral Daily Donel Savory, DO      losartan  25 mg Oral Daily Donel Savory, DO      montelukast  10 mg Oral Daily Virginia Longoria DO      zinc sulfate  220 mg Oral Daily Virginia Longoria DO      Followed by   Derek Linton ON 1/8/2021] multivitamin-minerals  1 tablet Oral Daily Virginia Longoria DO      remdesivir  100 mg Intravenous Q24H Virginia Longoria DO          Today, Patient Was Seen By: Virginia Longoria DO    ** Please Note: Dictation voice to text software may have been used in the creation of this document   **

## 2021-01-03 NOTE — ASSESSMENT & PLAN NOTE
· Required 3 lpm of continuous supplemental oxygen to maintain oxygen saturation levels at 92% and above at the time of admission  · The patient has been successfully weaned off supplemental oxygen on 01/03/2021  · Received remdesivir 200 mg IV x 1 dose on 12/31/2020 and continue remdesivir 100 mg IV every 24 hours (day #4) to complete the 5-day treatment course  · Continue dexamethason 6 mg IV every 24 hours (day #4)  · Convalescent plasma has been ordered on 01/01/2021  · Initially treated with ceftriaxone 1000 mg IV every 24 hours and doxycycline 100 mg IV every 12 hours, which will be discontinued at this time with multiple normal procalcitonin levels  · Utilize ascorbic acid 1000 mg PO every 12 hours, cholecalciferol 2000 I  U  PO Qdaily, and zinc sulfate 220 mg PO Qdaily  · Hold high-intensity statin therapy at this time secondary to transaminitis  · Utilize famotidine 20 mg PO Qdaily  · With the patient having bilateral pulmonary emboli, initially treated with an IV heparin drip/infusion per the VTE/PE scale (Raschke protocol), which was changed to lovenox 1 mg/kg SQ every 12 hours on 01/02/2021  · Follow inflammatory markers with a daily D-dimer level, a daily CRP level, and a daily ferritin level

## 2021-01-03 NOTE — ASSESSMENT & PLAN NOTE
· CTA of the chest/PE protocol (12/31/2020):  Mediastinal and hilar lymphadenopathy, likely reactive  A follow-up chest CT in 3 months is recommended to ensure resolution  · Outpatient follow-up with Pulmonology  · Will need an outpatient Hematology/Oncology evaluation if the lymphadenopathy persists on follow-up imaging    Results for Nu Joya (MRN [id number]) as of 1/2/2021 12:13   Ref   Range 1/2/2021 05:29   LD (LDH) Latest Ref Range: 81 - 234 U/L 486 (H)

## 2021-01-03 NOTE — ASSESSMENT & PLAN NOTE
· Secondary to IV dexamethasone treatment  · Follow the blood glucose trend  Outpatient follow-up with PCP in regards to this matter    Results for Denita Duke (MRN [id number]) as of 1/3/2021 11:59   Ref  Range 1/1/2021 06:25   Hemoglobin A1C Latest Ref Range: Normal 3 8-5 6%; PreDiabetic 5 7-6 4%;  Diabetic >=6 5%; Glycemic control for adults with diabetes <7 0% % 5 8 (H)   eAG, EST AVG Glucose Latest Units: mg/dl 120

## 2021-01-03 NOTE — ASSESSMENT & PLAN NOTE
· Likely secondary to COVID-19 pneumonia  · Initially treated with an IV heparin drip/infusion per the VTE/PE scale (Raschke protocol), which was changed to lovenox 1 mg/kg SQ every 12 hours on 01/02/2021  · Check a transthoracic echocardiogram to evaluate the patient for right heart strain  · Consult Pulmonology

## 2021-01-03 NOTE — ASSESSMENT & PLAN NOTE
· Hold statin therapy for now  · The transaminitis has improved  · Avoid all hepatotoxic agents  · Follow the liver function tests    Results from last 7 days   Lab Units 01/03/21  0519 01/02/21  0529 01/01/21  0625   SODIUM mmol/L 139 143 135*   POTASSIUM mmol/L 3 8 3 9 4 1   CHLORIDE mmol/L 106 109* 102   CO2 mmol/L 21 21 23   BUN mg/dL 26* 23 18   CREATININE mg/dL 0 98 0 92 1 02   CALCIUM mg/dL 8 1* 7 7* 8 0*   ALK PHOS U/L 155* 158* 199*   ALT U/L 35 35 40   AST U/L 39 43 57*     Results for Berta Brito (MRN [id number]) as of 1/2/2021 12:13   Ref   Range 1/1/2021 06:25   HEPATITIS A IGM ANTIBODY Latest Ref Range: Non-reactive, Equivocal-Suggest Recollect  Non-reactive   HEPATITIS B SURFACE ANTIGEN Latest Ref Range: Non-reactive, NonReactive - Confirmed  Non-reactive   HEPATITIS B CORE IGM ANTIBODY Latest Ref Range: Non-reactive  Non-reactive   HEPATITIS C ANTIBODY Latest Ref Range: Non-reactive  Non-reactive

## 2021-01-03 NOTE — ASSESSMENT & PLAN NOTE
· Resolved with potassium chloride supplementation treatment  · Follow the potassium level and magnesium level    Results from last 7 days   Lab Units 01/03/21  0519 01/02/21  0529 01/01/21  0625   SODIUM mmol/L 139 143 135*   POTASSIUM mmol/L 3 8 3 9 4 1   CHLORIDE mmol/L 106 109* 102   CO2 mmol/L 21 21 23   BUN mg/dL 26* 23 18   CREATININE mg/dL 0 98 0 92 1 02   CALCIUM mg/dL 8 1* 7 7* 8 0*

## 2021-01-03 NOTE — ASSESSMENT & PLAN NOTE
· Continue her home dose PO levothyroxine  · Recheck a TSH level in 1-2 weeks with her PCP    Results for Denita Duke (MRN [id number]) as of 1/1/2021 11:36   Ref   Range 1/1/2021 06:25   TSH 3RD GENERATON Latest Ref Range: 0 358 - 3 740 uIU/mL 0 217 (L)

## 2021-01-03 NOTE — ASSESSMENT & PLAN NOTE
· Continue her home dose PO levothyroxine  · Recheck a TSH level in 1-2 weeks with her PCP    Results for Yaya Encinas (MRN [id number]) as of 1/1/2021 11:36   Ref   Range 1/1/2021 06:25   TSH 3RD GENERATON Latest Ref Range: 0 358 - 3 740 uIU/mL 0 217 (L)

## 2021-01-03 NOTE — PLAN OF CARE
Problem: OCCUPATIONAL THERAPY ADULT  Goal: Performs self-care activities at highest level of function for planned discharge setting  See evaluation for individualized goals  Description: Treatment Interventions: ADL retraining, Functional transfer training, UE strengthening/ROM, Endurance training, Patient/family training, Equipment evaluation/education, Activityengagement          See flowsheet documentation for full assessment, interventions and recommendations  Note: Limitation: Decreased ADL status, Decreased UE strength, Decreased endurance, Decreased self-care trans, Decreased high-level ADLs     Assessment: Pt is a 67 y o  female seen for OT evaluation s/p admit to [facility] on 12/31/2020 w/ Pneumonia due to COVID-19 virus  Comorbidities affecting pt's functional performance at time of assessment include: HTN, allergies, hypothyroid  Personal factors affecting pt at time of IE include:behavioral pattern, difficulty performing ADLS, difficulty performing IADLS , limited insight into deficits, compliance, decreased initiation and engagement  and health management   Prior to admission, pt was (I) with ADLs and IADLs with no device during mobility  Upon evaluation: Pt requires (S) level with no device during mobility 2* the following deficits impacting occupational performance: weakness, decreased strength, decreased balance, decreased tolerance, impaired initiation, impaired problem solving, decreased safety awareness and impaired interpersonal skills  Pt to benefit from continued skilled OT tx while in the hospital to address deficits as defined above and maximize level of functional independence w ADL's and functional mobility  Occupational Performance areas to address include: grooming, bathing/shower, toilet hygiene, dressing, functional mobility, community mobility and clothing management  From OT standpoint, recommendation at time of d/c would be home health services        OT Discharge Recommendation: Home with skilled therapy

## 2021-01-04 ENCOUNTER — APPOINTMENT (INPATIENT)
Dept: NON INVASIVE DIAGNOSTICS | Facility: HOSPITAL | Age: 76
DRG: 177 | End: 2021-01-04
Payer: COMMERCIAL

## 2021-01-04 PROBLEM — I51.89 DIASTOLIC DYSFUNCTION: Status: ACTIVE | Noted: 2021-01-04

## 2021-01-04 LAB
ALBUMIN SERPL BCP-MCNC: 2.7 G/DL (ref 3.5–5)
ALP SERPL-CCNC: 141 U/L (ref 46–116)
ALT SERPL W P-5'-P-CCNC: 30 U/L (ref 12–78)
ANION GAP SERPL CALCULATED.3IONS-SCNC: 10 MMOL/L (ref 4–13)
AST SERPL W P-5'-P-CCNC: 28 U/L (ref 5–45)
BASOPHILS # BLD AUTO: 0.01 THOUSANDS/ΜL (ref 0–0.1)
BASOPHILS NFR BLD AUTO: 0 % (ref 0–1)
BILIRUB SERPL-MCNC: 0.4 MG/DL (ref 0.2–1)
BUN SERPL-MCNC: 25 MG/DL (ref 5–25)
CALCIUM ALBUM COR SERPL-MCNC: 8.9 MG/DL (ref 8.3–10.1)
CALCIUM SERPL-MCNC: 7.9 MG/DL (ref 8.3–10.1)
CHLORIDE SERPL-SCNC: 108 MMOL/L (ref 100–108)
CO2 SERPL-SCNC: 23 MMOL/L (ref 21–32)
CREAT SERPL-MCNC: 0.85 MG/DL (ref 0.6–1.3)
CRP SERPL QL: 12.9 MG/L
D DIMER PPP FEU-MCNC: 1.73 UG/ML FEU
EOSINOPHIL # BLD AUTO: 0 THOUSAND/ΜL (ref 0–0.61)
EOSINOPHIL NFR BLD AUTO: 0 % (ref 0–6)
ERYTHROCYTE [DISTWIDTH] IN BLOOD BY AUTOMATED COUNT: 14.5 % (ref 11.6–15.1)
FERRITIN SERPL-MCNC: 73 NG/ML (ref 8–388)
GFR SERPL CREATININE-BSD FRML MDRD: 67 ML/MIN/1.73SQ M
GLUCOSE SERPL-MCNC: 168 MG/DL (ref 65–140)
HCT VFR BLD AUTO: 33.9 % (ref 34.8–46.1)
HGB BLD-MCNC: 10.8 G/DL (ref 11.5–15.4)
IMM GRANULOCYTES # BLD AUTO: 0.05 THOUSAND/UL (ref 0–0.2)
IMM GRANULOCYTES NFR BLD AUTO: 1 % (ref 0–2)
LYMPHOCYTES # BLD AUTO: 0.74 THOUSANDS/ΜL (ref 0.6–4.47)
LYMPHOCYTES NFR BLD AUTO: 13 % (ref 14–44)
MAGNESIUM SERPL-MCNC: 2.1 MG/DL (ref 1.6–2.6)
MCH RBC QN AUTO: 28.4 PG (ref 26.8–34.3)
MCHC RBC AUTO-ENTMCNC: 31.9 G/DL (ref 31.4–37.4)
MCV RBC AUTO: 89 FL (ref 82–98)
MONOCYTES # BLD AUTO: 0.2 THOUSAND/ΜL (ref 0.17–1.22)
MONOCYTES NFR BLD AUTO: 3 % (ref 4–12)
NEUTROPHILS # BLD AUTO: 4.92 THOUSANDS/ΜL (ref 1.85–7.62)
NEUTS SEG NFR BLD AUTO: 83 % (ref 43–75)
NRBC BLD AUTO-RTO: 0 /100 WBCS
PHOSPHATE SERPL-MCNC: 4 MG/DL (ref 2.3–4.1)
PLATELET # BLD AUTO: 249 THOUSANDS/UL (ref 149–390)
PMV BLD AUTO: 11.5 FL (ref 8.9–12.7)
POTASSIUM SERPL-SCNC: 3.7 MMOL/L (ref 3.5–5.3)
PROCALCITONIN SERPL-MCNC: 0.09 NG/ML
PROT SERPL-MCNC: 5.9 G/DL (ref 6.4–8.2)
RBC # BLD AUTO: 3.8 MILLION/UL (ref 3.81–5.12)
SODIUM SERPL-SCNC: 141 MMOL/L (ref 136–145)
WBC # BLD AUTO: 5.92 THOUSAND/UL (ref 4.31–10.16)

## 2021-01-04 PROCEDURE — 86140 C-REACTIVE PROTEIN: CPT | Performed by: INTERNAL MEDICINE

## 2021-01-04 PROCEDURE — 80053 COMPREHEN METABOLIC PANEL: CPT | Performed by: INTERNAL MEDICINE

## 2021-01-04 PROCEDURE — 93306 TTE W/DOPPLER COMPLETE: CPT

## 2021-01-04 PROCEDURE — 82728 ASSAY OF FERRITIN: CPT | Performed by: INTERNAL MEDICINE

## 2021-01-04 PROCEDURE — 85025 COMPLETE CBC W/AUTO DIFF WBC: CPT | Performed by: INTERNAL MEDICINE

## 2021-01-04 PROCEDURE — 83735 ASSAY OF MAGNESIUM: CPT | Performed by: INTERNAL MEDICINE

## 2021-01-04 PROCEDURE — 84100 ASSAY OF PHOSPHORUS: CPT | Performed by: INTERNAL MEDICINE

## 2021-01-04 PROCEDURE — 99223 1ST HOSP IP/OBS HIGH 75: CPT | Performed by: INTERNAL MEDICINE

## 2021-01-04 PROCEDURE — 97116 GAIT TRAINING THERAPY: CPT

## 2021-01-04 PROCEDURE — 85379 FIBRIN DEGRADATION QUANT: CPT | Performed by: INTERNAL MEDICINE

## 2021-01-04 PROCEDURE — 99232 SBSQ HOSP IP/OBS MODERATE 35: CPT | Performed by: INTERNAL MEDICINE

## 2021-01-04 PROCEDURE — 93306 TTE W/DOPPLER COMPLETE: CPT | Performed by: INTERNAL MEDICINE

## 2021-01-04 PROCEDURE — 84145 PROCALCITONIN (PCT): CPT | Performed by: INTERNAL MEDICINE

## 2021-01-04 RX ORDER — POTASSIUM CHLORIDE 20 MEQ/1
40 TABLET, EXTENDED RELEASE ORAL ONCE
Status: COMPLETED | OUTPATIENT
Start: 2021-01-04 | End: 2021-01-04

## 2021-01-04 RX ADMIN — LORATADINE 10 MG: 10 TABLET ORAL at 08:38

## 2021-01-04 RX ADMIN — LOSARTAN POTASSIUM 25 MG: 25 TABLET, FILM COATED ORAL at 08:38

## 2021-01-04 RX ADMIN — ZINC SULFATE 220 MG (50 MG) CAPSULE 220 MG: CAPSULE at 08:38

## 2021-01-04 RX ADMIN — GABAPENTIN 100 MG: 100 CAPSULE ORAL at 08:38

## 2021-01-04 RX ADMIN — DEXAMETHASONE SODIUM PHOSPHATE 6 MG: 4 INJECTION, SOLUTION INTRA-ARTICULAR; INTRALESIONAL; INTRAMUSCULAR; INTRAVENOUS; SOFT TISSUE at 17:41

## 2021-01-04 RX ADMIN — AMLODIPINE BESYLATE 2.5 MG: 2.5 TABLET ORAL at 08:38

## 2021-01-04 RX ADMIN — MONTELUKAST 10 MG: 10 TABLET, FILM COATED ORAL at 08:38

## 2021-01-04 RX ADMIN — GABAPENTIN 100 MG: 100 CAPSULE ORAL at 17:41

## 2021-01-04 RX ADMIN — OXYCODONE HYDROCHLORIDE AND ACETAMINOPHEN 1000 MG: 500 TABLET ORAL at 08:38

## 2021-01-04 RX ADMIN — Medication 2000 UNITS: at 08:38

## 2021-01-04 RX ADMIN — POTASSIUM CHLORIDE 40 MEQ: 1500 TABLET, EXTENDED RELEASE ORAL at 08:38

## 2021-01-04 RX ADMIN — ENOXAPARIN SODIUM 105 MG: 120 INJECTION SUBCUTANEOUS at 20:03

## 2021-01-04 RX ADMIN — OXYCODONE HYDROCHLORIDE AND ACETAMINOPHEN 1000 MG: 500 TABLET ORAL at 20:03

## 2021-01-04 RX ADMIN — ENOXAPARIN SODIUM 105 MG: 120 INJECTION SUBCUTANEOUS at 08:54

## 2021-01-04 RX ADMIN — REMDESIVIR 100 MG: 100 INJECTION, POWDER, LYOPHILIZED, FOR SOLUTION INTRAVENOUS at 17:41

## 2021-01-04 RX ADMIN — LEVOTHYROXINE SODIUM 100 MCG: 100 TABLET ORAL at 05:13

## 2021-01-04 RX ADMIN — FAMOTIDINE 20 MG: 20 TABLET, FILM COATED ORAL at 08:38

## 2021-01-04 NOTE — PHYSICAL THERAPY NOTE
PHYSICAL THERAPY NOTE          Patient Name: Brennon Mackenzie August Today's Date: 1/4/2021 01/04/21 1770   Restrictions/Precautions   Other Precautions Fall Risk   Cognition   Overall Cognitive Status WFL   Arousal/Participation Alert; Cooperative   Following Commands Follows all commands and directions without difficulty   Transfers   Sit to Stand 5  Supervision   Additional items Assist x 1   Stand to Sit 5  Supervision   Additional items Assist x 1   Ambulation/Elevation   Gait pattern Short stride   Gait Assistance 5  Supervision   Additional items Assist x 1   Assistive Device Rolling walker   Distance 25' x 2 with seated rest   Balance   Ambulatory Fair +  (RW)   Endurance Deficit   Endurance Deficit Yes   Activity Tolerance   Activity Tolerance Patient limited by fatigue   Assessment   Prognosis Good   Problem List Decreased strength;Decreased endurance; Impaired balance;Decreased mobility   Assessment Pt  seen for PT treatment session this date with interventions consisting of  Transfers and gait training w/ emphasis on improving pt's ability to ambulate  Pt  Currently performing  tx and ambulation at (SUP ) x 1 level of function  Utilizing RW with fair balance and stability  Transfer training to increase functional task performance and  to promote safe sit/stand and stand/sit mobility  Pt  education  for hand postion and safety and technique with transfer training  In comparison to previous session, Pt  With improvements in activity tolerance  SpO2 94-95% RA, mild SOB  Pt is in need of continued activity in PT to improve strength balance endurance mobility transfers and ambulation with return to maximize LOF  From PT/mobility standpoint, recommendation at time of d/c would be home Pt  in order to promote return to PLOF and independence      Goals   LTG Expiration Date 01/15/21   PT Treatment Day 1   Plan Treatment/Interventions Functional transfer training;LE strengthening/ROM; Therapeutic exercise; Endurance training;Bed mobility;Gait training   Progress Progressing toward goals   Recommendation   PT Discharge Recommendation Home with skilled therapy   Pt  OOB in chair  with call bell within reach  at end of PT session  Discussed with  PT today's treatment and patient's current level of function for care coordination

## 2021-01-04 NOTE — RESPIRATORY THERAPY NOTE
01/03/21 2127   Oxygen Therapy/Pulse Ox   O2 Device None (Room air)   O2 Therapy Room air   SpO2 Activity At Rest   $ Pulse Oximetry Spot Check Charge Completed       Patient is sitting up watching tv, she is not in respiratory distress  Patient states she feels better  She was not using her incentive spirometer because she stated she was fatigued, but she will start doing it again   Good return technique, npc cough on command

## 2021-01-04 NOTE — CASE MANAGEMENT
Pt requesting a BSC as well as a walker for dc  Script provided to therapy for the walker and CM will work on arranging a [address] from [facility] DME

## 2021-01-04 NOTE — ASSESSMENT & PLAN NOTE
· Secondary to IV dexamethasone treatment  · Follow the blood glucose trend  Outpatient follow-up with PCP in regards to this matter    Results for Arline Sloan (MRN [id number]) as of 1/3/2021 11:59   Ref  Range 1/1/2021 06:25   Hemoglobin A1C Latest Ref Range: Normal 3 8-5 6%; PreDiabetic 5 7-6 4%;  Diabetic >=6 5%; Glycemic control for adults with diabetes <7 0% % 5 8 (H)   eAG, EST AVG Glucose Latest Units: mg/dl 120

## 2021-01-04 NOTE — ASSESSMENT & PLAN NOTE
· Mild hyponatremia  · Likely hypovolemic hyponatremia  · Resolved  · The NSS IV fluids were discontinued on 01/02/2021 in the setting of hyperchloremia    · Follow the sodium level    Results from last 7 days   Lab Units 01/04/21  0506 01/03/21  0519 01/02/21  0529   SODIUM mmol/L 141 139 143   POTASSIUM mmol/L 3 7 3 8 3 9   CHLORIDE mmol/L 108 106 109*   CO2 mmol/L 23 21 21   BUN mg/dL 25 26* 23   CREATININE mg/dL 0 85 0 98 0 92   CALCIUM mg/dL 7 9* 8 1* 7 7*

## 2021-01-04 NOTE — ASSESSMENT & PLAN NOTE
· Required 3 lpm of continuous supplemental oxygen to maintain oxygen saturation levels at 92% and above at the time of admission  · The patient was successfully weaned off supplemental oxygen on 01/03/2021  · Received remdesivir 200 mg IV x 1 dose on 12/31/2020 and continue remdesivir 100 mg IV every 24 hours (day #5) to complete the 5-day treatment course  · Continue dexamethason 6 mg IV every 24 hours (day #5)  · Convalescent plasma has been ordered on 01/01/2021  · Initially treated with ceftriaxone 1000 mg IV every 24 hours and doxycycline 100 mg IV every 12 hours, which will be discontinued at this time with multiple normal procalcitonin levels  · Utilize ascorbic acid 1000 mg PO every 12 hours, cholecalciferol 2000 I  U  PO Qdaily, and zinc sulfate 220 mg PO Qdaily  · Hold high-intensity statin therapy at this time secondary to transaminitis  · Utilize famotidine 20 mg PO Qdaily  · With the patient having bilateral pulmonary emboli, initially treated with an IV heparin drip/infusion per the VTE/PE scale (Raschke protocol), which was changed to lovenox 1 mg/kg SQ every 12 hours on 01/02/2021  · Follow inflammatory markers with a daily D-dimer level, a daily CRP level, and a daily ferritin level  · The patient was seen in consultation by Pulmonology

## 2021-01-04 NOTE — ASSESSMENT & PLAN NOTE
· Continue her home dose PO levothyroxine  · Recheck a TSH level in 1-2 weeks with her PCP    Results for [place] Primus (MRN [id number]) as of 1/1/2021 11:36   Ref   Range 1/1/2021 06:25   TSH 3RD GENERATON Latest Ref Range: 0 358 - 3 740 uIU/mL 0 217 (L)

## 2021-01-04 NOTE — UTILIZATION REVIEW
Notification of Inpatient Admission/Inpatient Authorization Request   This is a Notification of Inpatient Admission for [address]  Be advised that this patient was admitted to our facility under Inpatient Status  Contact Ilana Kathleen at [phone] for additional admission information  [facility] DEPT  DEDICATED [phone]  Patient Name:   Lisa Quintana   YOB: 1945       [address]   [address]:   [address]  Tax ID: [id number]  NPI: [id number] Attending Provider/NPI:  Phone:  Address: [place] [[id number]]  [phone]  Same as Facility   Place of Service Code: 24 Place of Service Name:  [address] [place] [address]   Start Date: 12/31/20 1613 Discharge Date & Time: No discharge date for patient encounter  Type of Admission: Inpatient Status Discharge Disposition   (if discharged): Final discharge disposition not confirmed   Patient Diagnoses: Chills [R68 83]  Dizziness [R42]  SOB (shortness of breath) [R06 02]  Hypoxia [R09 02]  Bilateral pulmonary embolism ([facility] [address] ) [I26 99]  COVID-19 with pulmonary comorbidity [U07 1, J98 4]     Orders: Admission Orders (From admission, onward)     Ordered        12/31/20 1613  Inpatient Admission  Once                    Assigned Utilization Review Contact: Ilana Kathleen  Utilization   Network Utilization Review Department  Phone: [phone]; Fax [phone]  Email: [email or web address]   ATTENTION PAYERS: Please call the assigned Utilization  directly with any questions or concerns ALL voicemails in the department are confidential  Send all requests for admission clinical reviews, approved or denied determinations and any other requests to dedicated fax number belonging to the campus where the patient is receiving treatment

## 2021-01-04 NOTE — ASSESSMENT & PLAN NOTE
· Resolved with potassium chloride supplementation treatment  · Follow the potassium level and magnesium level    Results from last 7 days   Lab Units 01/04/21  0506 01/03/21  0519 01/02/21  0529   SODIUM mmol/L 141 139 143   POTASSIUM mmol/L 3 7 3 8 3 9   CHLORIDE mmol/L 108 106 109*   CO2 mmol/L 23 21 21   BUN mg/dL 25 26* 23   CREATININE mg/dL 0 85 0 98 0 92   CALCIUM mg/dL 7 9* 8 1* 7 7*

## 2021-01-04 NOTE — ASSESSMENT & PLAN NOTE
· Hold statin therapy for now  · The transaminitis has improved  · Avoid all hepatotoxic agents  · Follow the liver function tests    Results from last 7 days   Lab Units 01/04/21  0506 01/03/21  0519 01/02/21  0529   SODIUM mmol/L 141 139 143   POTASSIUM mmol/L 3 7 3 8 3 9   CHLORIDE mmol/L 108 106 109*   CO2 mmol/L 23 21 21   BUN mg/dL 25 26* 23   CREATININE mg/dL 0 85 0 98 0 92   CALCIUM mg/dL 7 9* 8 1* 7 7*   ALK PHOS U/L 141* 155* 158*   ALT U/L 30 35 35   AST U/L 28 39 43     Results for Yvonne Goins (MRN [id number]) as of 1/2/2021 12:13   Ref   Range 1/1/2021 06:25   HEPATITIS A IGM ANTIBODY Latest Ref Range: Non-reactive, Equivocal-Suggest Recollect  Non-reactive   HEPATITIS B SURFACE ANTIGEN Latest Ref Range: Non-reactive, NonReactive - Confirmed  Non-reactive   HEPATITIS B CORE IGM ANTIBODY Latest Ref Range: Non-reactive  Non-reactive   HEPATITIS C ANTIBODY Latest Ref Range: Non-reactive  Non-reactive

## 2021-01-04 NOTE — ASSESSMENT & PLAN NOTE
· Likely secondary to COVID-19 pneumonia  · Initially treated with an IV heparin drip/infusion per the VTE/PE scale (Raschke protocol), which was changed to lovenox 1 mg/kg SQ every 12 hours on 01/02/2021  · Check a transthoracic echocardiogram to evaluate the patient for right heart strain  · The patient was seen in consultation by Pulmonology

## 2021-01-04 NOTE — ASSESSMENT & PLAN NOTE
· CTA of the chest/PE protocol (12/31/2020):  Mediastinal and hilar lymphadenopathy, likely reactive  A follow-up chest CT in 3 months is recommended to ensure resolution  · Outpatient follow-up with Pulmonology  · Will need an outpatient Hematology/Oncology evaluation if the lymphadenopathy persists on follow-up imaging  · Recheck an LDH level as an outpatient with PCP    Results for Pema Garrett (MRN [id number]) as of 1/2/2021 12:13   Ref   Range 1/2/2021 05:29   LD (LDH) Latest Ref Range: 81 - 234 U/L 486 (H)

## 2021-01-04 NOTE — ASSESSMENT & PLAN NOTE
· Resolved  · The NSS IV fluids were discontinued on 01/02/2021    · Follow the chloride level    Results from last 7 days   Lab Units 01/04/21  0506 01/03/21  0519 01/02/21  0529   SODIUM mmol/L 141 139 143   POTASSIUM mmol/L 3 7 3 8 3 9   CHLORIDE mmol/L 108 106 109*   CO2 mmol/L 23 21 21   BUN mg/dL 25 26* 23   CREATININE mg/dL 0 85 0 98 0 92   CALCIUM mg/dL 7 9* 8 1* 7 7*

## 2021-01-04 NOTE — CONSULTS
Consultation - Pulmonary Medicine   [facility] August 75 y o  female MRN: [id number]  Unit/Bed#: 401-01 Encounter: [id number]      Assessment/Plan:    1  Acute hypoxic respiratory failure secondary to below  1  Resolved-currently on room air  2  Monitor closely maintain SpO2 greater than 90%  3  Pulmonary toilet:  Increase activity as tolerated, out of bed as tolerated, cough and deep breathing as encouraged, incentive spirometry q 1 hour  2  Pneumonia secondary to COVID-19 virus  1  Follow mild protocol  2  Complete 5 days of remdesivir today 01/04/2020  3  Continue Decadron 6 mg IV daily for 10 days or until off supplemental oxygen-no need for steroids at discharge  4  Continue vitamin-C, vitamin-D, zinc  5  Atorvastatin held due to transaminitis  6  Continue famotidine 20 mg p o  Daily for prophylaxis  7  Continue anticoagulation with Lovenox 1 milligram/kilogram subcu q 12 hours while inpatient transition to full-dose NOAC at time of discharge  3  Bilateral pulmonary embolism  1  Continue Lovenox 1 mg/kg subqu q12hr while inpatient   2  Agree with checking echocardiogram  3  At time of discharge, may send patient home on full dose NOAC  4  Minimum AC duration 3-6 months   4  Abnormal CT secondary to bilateral pulmonary emboli, pneumonia, and mediastinal lymphadenopathy  1  Repeat CT chest 3 months  2  Treatment as above  5  Morbid obesity  1  Weight loss encouraged  6  Transaminitis  1  Alk phos improving  2  Avoid hepatoxic agents  3  Further treatment per primary team  7  Elevated inflammatory markers  1  Secondary to #2 and #3  2  Continue to trend  3  Continue weight based Lovenox while inpatient  4   Contrast addition to full-dose NOAC at time of discharge in maintain minimal anticoagulation for 3-6    History of Present Illness   Physician Requesting Consult: Flaco Rosenbaum DO  Reason for Consult / Principal Problem:  COVID-19 pneumonia, bilateral pulmonary emboli  Hx and PE limited by: n/a  Chief Complaint: shortness of breath   HPI: Candance Hake August is a 67 y o   female who presented to [place] [address] with complaints of shortness of breath  Patient's past medical history positive for allergic rhinitis, hypertension, and hypothyroidism, and morbid obesity  Patient reports a week's worth of worsening shortness of breath, malaise, fatigue, dry nonproductive cough, diarrhea  Patient presented to the ED 12/31/2020 for the above-mentioned symptoms at restriction or per PCP  She tested positive for COVID-19 pneumonia  Other laboratory workup revealed elevated alk-phos, elevated inflammatory markers including CRP and D-dimer, hypokalemia, and hyperglycemia  CTA chest PE study showed moderate bilateral acute pulmonary emboli and bilateral peripheral ground-glass opacities consistent COVID-19 pneumonia and mediastinal hilar lymphadenopathy likely reactive  Patient was initially hypoxic requiring 3 L nasal cannula  She was admitted to the medical-surgical floor and started on heparin drip along with COVID-19 treatment protocol  Over the weekend she progressed well in this currently seen on room air no acute distress  Patient reports feeling significantly improved compared to how she felt last Thursday  Continues to have some dyspnea on exertion but none compared to what she had felt previously  Also has a dry nonproductive cough  Denies wheeze, chest tightness, chest pain, palpitations  No fevers or chills  Denies dizziness or headache  No nausea, vomiting, abdominal pain  She has mild diarrhea today  No leg pain or leg swelling  From a pulmonary standpoint, patient does not follow a pulmonologist   She reports allergic rhinitis for which he takes over-the-counter antihistamines on an as-needed basis  Denies use of inhalers, nebulizers, or supplemental oxygen at baseline  She has never been intubated  She has never been hospitalized for respiratory related illness    She is lifelong nonsmoker  Denies illicit drug use or alcohol use  Denies exposures to asbestos, silica, coal, dust, chemicals  Inpatient consult to Pulmonology  Consult performed by: Zheng Barrientos PA-C  Consult ordered by: Virginia Longoria DO          Review of Systems   All other systems reviewed and are negative  Historical Information   Past Medical History:   Diagnosis Date    Allergic     Hypertension     last assessed 5/4/15     Hypothyroid      Past Surgical History:   Procedure Laterality Date    DILATION AND CURETTAGE OF UTERUS      GALLBLADDER SURGERY       Social History   Social History     Substance and Sexual Activity   Alcohol Use Not Currently    Alcohol/week: 0 0 standard drinks    Frequency: Never     Social History     Substance and Sexual Activity   Drug Use No     Social History     Tobacco Use   Smoking Status Never Smoker   Smokeless Tobacco Never Used     E-Cigarette/Vaping    E-Cigarette Use Never User      E-Cigarette/Vaping Substances     Occupational History: retired    Noncontributory    Family History:   Family History   Problem Relation Age of Onset    Emphysema Mother     Emphysema Father        Meds/Allergies   all current active meds have been reviewed, pertinent pulmonary meds have been reviewed and current meds:   Current Facility-Administered Medications   Medication Dose Route Frequency    albuterol (PROVENTIL HFA,VENTOLIN HFA) inhaler 2 puff  2 puff Inhalation Q4H PRN    amLODIPine (NORVASC) tablet 2 5 mg  2 5 mg Oral Daily    ascorbic acid (VITAMIN C) tablet 1,000 mg  1,000 mg Oral Q12H [facility]    cholecalciferol (VITAMIN D3) tablet 2,000 Units  2,000 Units Oral Daily    dexamethasone (DECADRON) injection 6 mg  6 mg Intravenous Q24H    enoxaparin (LOVENOX) subcutaneous injection 105 mg  1 mg/kg Subcutaneous Q12H [facility]    famotidine (PEPCID) tablet 20 mg  20 mg Oral Daily    gabapentin (NEURONTIN) capsule 100 mg  100 mg Oral BID    levothyroxine tablet 100 mcg  100 mcg Oral Early Morning    loratadine (CLARITIN) tablet 10 mg  10 mg Oral Daily    losartan (COZAAR) tablet 25 mg  25 mg Oral Daily    montelukast (SINGULAIR) tablet 10 mg  10 mg Oral Daily    zinc sulfate (ZINCATE) capsule 220 mg  220 mg Oral Daily    Followed by   Aurelia Cueto ON 1/8/2021] multivitamin-minerals (CENTRUM ADULTS) tablet 1 tablet  1 tablet Oral Daily    remdesivir (Veklury) 100 mg in sodium chloride 0 9 % 250 mL IVPB  100 mg Intravenous Q24H       Allergies   Allergen Reactions    Nuts Anaphylaxis    Honey Tussin [Dextromethorphan]     Penicillins     Shrimp Extract Allergy Skin Test        Objective   Vitals: Blood pressure 146/66, pulse 67, temperature 98 °F (36 7 °C), resp  rate 18, height 5' 4" (1 626 m), weight 108 kg (238 lb 1 6 oz), SpO2 95 %  room air,Body mass index is 40 87 kg/m²  Intake/Output Summary (Last 24 hours) at 1/4/2021 0955  Last data filed at 1/4/2021 0600  Gross per 24 hour   Intake 360 ml   Output 950 ml   Net -590 ml     Invasive Devices     Peripheral Intravenous Line            Peripheral IV 01/03/21 Dorsal (posterior); Right Hand 1 day                Physical Exam  Vitals signs and nursing note reviewed  Constitutional:       General: She is not in acute distress  Appearance: Normal appearance  She is obese  HENT:      Head: Normocephalic and atraumatic  Right Ear: External ear normal       Left Ear: External ear normal       Nose: Nose normal       Mouth/Throat:      Mouth: Mucous membranes are moist       Pharynx: Oropharynx is clear  Eyes:      Extraocular Movements: Extraocular movements intact  Conjunctiva/sclera: Conjunctivae normal       Pupils: Pupils are equal, round, and reactive to light  Neck:      Musculoskeletal: Normal range of motion and neck supple  No muscular tenderness  Cardiovascular:      Rate and Rhythm: Normal rate and regular rhythm  Pulses: Normal pulses  Heart sounds: No murmur     Pulmonary: Effort: Pulmonary effort is normal  No respiratory distress  Breath sounds: Normal breath sounds  No wheezing or rhonchi  Abdominal:      General: Bowel sounds are normal       Palpations: Abdomen is soft  There is no mass  Tenderness: There is no abdominal tenderness  Hernia: No hernia is present  Musculoskeletal: Normal range of motion  General: No tenderness or deformity  Right lower leg: No edema  Left lower leg: No edema  Skin:     General: Skin is warm and dry  Neurological:      General: No focal deficit present  Mental Status: She is alert and oriented to person, place, and time  Mental status is at baseline  Psychiatric:         Mood and Affect: Mood normal          Behavior: Behavior normal          Thought Content: Thought content normal          Judgment: Judgment normal          Lab Results:   I have personally reviewed pertinent lab results  , ABG: No results found for: PHART, EKC0ZYQ, PO2ART, ZBC9RYF, D8BAQNRE, BEART, SOURCE, BNP: No results found for: BNP, CBC:   Lab Results   Component Value Date    WBC 5 92 01/04/2021    HGB 10 8 (L) 01/04/2021    HCT 33 9 (L) 01/04/2021    MCV 89 01/04/2021     01/04/2021    MCH 28 4 01/04/2021    MCHC 31 9 01/04/2021    RDW 14 5 01/04/2021    MPV 11 5 01/04/2021    NRBC 0 01/04/2021   , CMP:   Lab Results   Component Value Date    SODIUM 141 01/04/2021    K 3 7 01/04/2021     01/04/2021    CO2 23 01/04/2021    BUN 25 01/04/2021    CREATININE 0 85 01/04/2021    CALCIUM 7 9 (L) 01/04/2021    AST 28 01/04/2021    ALT 30 01/04/2021    ALKPHOS 141 (H) 01/04/2021    EGFR 67 01/04/2021   , PT/INR: No results found for: PT, INR, Troponin: No results found for: TROPONINI    Imaging Studies: I have personally reviewed pertinent reports  and I have personally reviewed pertinent films in PACS     CTA chest PE study 12/31/2020  Moderate bilateral pulmonary emboli    Bilateral peripheral ground-glass opacities consistent COVID 19 pneumonia  Mediastinal and hilar lymphadenopathy  EKG, Pathology, and Other Studies: I have personally reviewed pertinent reports  Echo pending    Pulmonary Results (PFTs, PSG): none to review      VTE Prophylaxis: Enoxaparin (Lovenox)    Code Status: Level 1 - Full Code      Portions of the record may have been created with voice recognition software  Occasional wrong word or "sound a like" substitutions may have occurred due to the inherent limitations of voice recognition software  Read the chart carefully and recognize, using context, where substitutions have occurred

## 2021-01-04 NOTE — PLAN OF CARE
Problem: PHYSICAL THERAPY ADULT  Goal: Performs mobility at highest level of function for planned discharge setting  See evaluation for individualized goals  Outcome: Progressing  Note: Prognosis: Good  Problem List: Decreased strength, Decreased endurance, Impaired balance, Decreased mobility  Assessment: Pt  seen for PT treatment session this date with interventions consisting of  Transfers and gait training w/ emphasis on improving pt's ability to ambulate  Pt  Currently performing  tx and ambulation at (SUP ) x 1 level of function  Utilizing RW with fair balance and stability  Transfer training to increase functional task performance and  to promote safe sit/stand and stand/sit mobility  Pt  education  for hand postion and safety and technique with transfer training  In comparison to previous session, Pt  With improvements in activity tolerance  SpO2 94-95% RA, mild SOB  Pt is in need of continued activity in PT to improve strength balance endurance mobility transfers and ambulation with return to maximize LOF  From PT/mobility standpoint, recommendation at time of d/c would be home Pt  in order to promote return to PLOF and independence  PT Discharge Recommendation: Home with skilled therapy       See flowsheet documentation for full assessment

## 2021-01-04 NOTE — ASSESSMENT & PLAN NOTE
· Continue her home dose PO levothyroxine  · Recheck a TSH level in 1-2 weeks with her PCP    Results for Gabby Patrick (MRN [id number]) as of 1/1/2021 11:36   Ref   Range 1/1/2021 06:25   TSH 3RD GENERATON Latest Ref Range: 0 358 - 3 740 uIU/mL 0 217 (L)

## 2021-01-04 NOTE — PROGRESS NOTES
Progress Note - Sheri Campos August 1945, 67 y o  female MRN: [id number]    Unit/Bed#: 401-01 Encounter: [id number]    Primary Care Provider: DANIELLA Walls   Date and time admitted to hospital: 12/31/2020  1:12 PM        * Pneumonia due to COVID-19 virus  Assessment & Plan  · Required 3 lpm of continuous supplemental oxygen to maintain oxygen saturation levels at 92% and above at the time of admission  · The patient was successfully weaned off supplemental oxygen on 01/03/2021  · Received remdesivir 200 mg IV x 1 dose on 12/31/2020 and continue remdesivir 100 mg IV every 24 hours (day #5) to complete the 5-day treatment course  · Continue dexamethason 6 mg IV every 24 hours (day #5)  · Convalescent plasma has been ordered on 01/01/2021  · Initially treated with ceftriaxone 1000 mg IV every 24 hours and doxycycline 100 mg IV every 12 hours, which will be discontinued at this time with multiple normal procalcitonin levels  · Utilize ascorbic acid 1000 mg PO every 12 hours, cholecalciferol 2000 I  U  PO Qdaily, and zinc sulfate 220 mg PO Qdaily  · Hold high-intensity statin therapy at this time secondary to transaminitis  · Utilize famotidine 20 mg PO Qdaily  · With the patient having bilateral pulmonary emboli, initially treated with an IV heparin drip/infusion per the VTE/PE scale (Raschke protocol), which was changed to lovenox 1 mg/kg SQ every 12 hours on 01/02/2021  · Follow inflammatory markers with a daily D-dimer level, a daily CRP level, and a daily ferritin level  · The patient was seen in consultation by Pulmonology      Bilateral pulmonary embolism (HCC)  Assessment & Plan  · Likely secondary to COVID-19 pneumonia  · Initially treated with an IV heparin drip/infusion per the VTE/PE scale (Raschke protocol), which was changed to lovenox 1 mg/kg SQ every 12 hours on 01/02/2021  · Check a transthoracic echocardiogram to evaluate the patient for right heart strain  · The patient was seen in consultation by Pulmonology  Hyperglycemia  Assessment & Plan  · Secondary to IV dexamethasone treatment  · Follow the blood glucose trend  Outpatient follow-up with PCP in regards to this matter    Results for Reg Ferris (MRN [id number]) as of 1/3/2021 11:59   Ref  Range 1/1/2021 06:25   Hemoglobin A1C Latest Ref Range: Normal 3 8-5 6%; PreDiabetic 5 7-6 4%; Diabetic >=6 5%; Glycemic control for adults with diabetes <7 0% % 5 8 (H)   eAG, EST AVG Glucose Latest Units: mg/dl 120       Hyperchloremia  Assessment & Plan  · Resolved  · The NSS IV fluids were discontinued on 01/02/2021  · Follow the chloride level    Results from last 7 days   Lab Units 01/04/21  0506 01/03/21  0519 01/02/21  0529   SODIUM mmol/L 141 139 143   POTASSIUM mmol/L 3 7 3 8 3 9   CHLORIDE mmol/L 108 106 109*   CO2 mmol/L 23 21 21   BUN mg/dL 25 26* 23   CREATININE mg/dL 0 85 0 98 0 92   CALCIUM mg/dL 7 9* 8 1* 7 7*         Low TSH level  Assessment & Plan  · Continue her home dose PO levothyroxine  · Recheck a TSH level in 1-2 weeks with her PCP    Results for Reg Ferris (MRN [id number]) as of 1/1/2021 11:36   Ref  Range 1/1/2021 06:25   TSH 3RD GENERATON Latest Ref Range: 0 358 - 3 740 uIU/mL 0 217 (L)       Hyponatremia  Assessment & Plan  · Mild hyponatremia  · Likely hypovolemic hyponatremia  · Resolved  · The NSS IV fluids were discontinued on 01/02/2021 in the setting of hyperchloremia  · Follow the sodium level    Results from last 7 days   Lab Units 01/04/21  0506 01/03/21  0519 01/02/21  0529   SODIUM mmol/L 141 139 143   POTASSIUM mmol/L 3 7 3 8 3 9   CHLORIDE mmol/L 108 106 109*   CO2 mmol/L 23 21 21   BUN mg/dL 25 26* 23   CREATININE mg/dL 0 85 0 98 0 92   CALCIUM mg/dL 7 9* 8 1* 7 7*         Transaminitis  Assessment & Plan  · Hold statin therapy for now  · The transaminitis has improved    · Avoid all hepatotoxic agents  · Follow the liver function tests    Results from last 7 days   Lab Units 01/04/21  2603 01/03/21  0519 01/02/21  0529   SODIUM mmol/L 141 139 143   POTASSIUM mmol/L 3 7 3 8 3 9   CHLORIDE mmol/L 108 106 109*   CO2 mmol/L 23 21 21   BUN mg/dL 25 26* 23   CREATININE mg/dL 0 85 0 98 0 92   CALCIUM mg/dL 7 9* 8 1* 7 7*   ALK PHOS U/L 141* 155* 158*   ALT U/L 30 35 35   AST U/L 28 39 43     Results for Heber Rivera (MRN [id number]) as of 1/2/2021 12:13   Ref  Range 1/1/2021 06:25   HEPATITIS A IGM ANTIBODY Latest Ref Range: Non-reactive, Equivocal-Suggest Recollect  Non-reactive   HEPATITIS B SURFACE ANTIGEN Latest Ref Range: Non-reactive, NonReactive - Confirmed  Non-reactive   HEPATITIS B CORE IGM ANTIBODY Latest Ref Range: Non-reactive  Non-reactive   HEPATITIS C ANTIBODY Latest Ref Range: Non-reactive  Non-reactive       Hypokalemia  Assessment & Plan  · Resolved with potassium chloride supplementation treatment  · Follow the potassium level and magnesium level    Results from last 7 days   Lab Units 01/04/21  0506 01/03/21  0519 01/02/21  0529   SODIUM mmol/L 141 139 143   POTASSIUM mmol/L 3 7 3 8 3 9   CHLORIDE mmol/L 108 106 109*   CO2 mmol/L 23 21 21   BUN mg/dL 25 26* 23   CREATININE mg/dL 0 85 0 98 0 92   CALCIUM mg/dL 7 9* 8 1* 7 7*         Acquired hypothyroidism  Assessment & Plan  · Continue her home dose PO levothyroxine  · Recheck a TSH level in 1-2 weeks with her PCP    Results for Heber Rivera (MRN [id number]) as of 1/1/2021 11:36   Ref  Range 1/1/2021 06:25   TSH 3RD GENERATON Latest Ref Range: 0 358 - 3 740 uIU/mL 0 217 (L)       Lymphadenopathy  Assessment & Plan  · CTA of the chest/PE protocol (12/31/2020):  Mediastinal and hilar lymphadenopathy, likely reactive  A follow-up chest CT in 3 months is recommended to ensure resolution    · Outpatient follow-up with Pulmonology  · Will need an outpatient Hematology/Oncology evaluation if the lymphadenopathy persists on follow-up imaging  · Recheck an LDH level as an outpatient with PCP    Results for Heber Rivera (MRN [id number]) as of 2021 12:13   Ref  Range 2021 05:29   LD (LDH) Latest Ref Range: 81 - 234 U/L 486 (H)       Hypercholesterolemia  Assessment & Plan  · Hold statin therapy at this time in the setting of transaminitis    Essential hypertension  Assessment & Plan  · Continue PO amlodipine and PO losartan  · Follow the blood pressure trend    Obstructive sleep apnea  Assessment & Plan  · Has probable obstructive sleep apnea per her PCP  · Was instructed to have a sleep study completed, which the patient has not yet done  · Needs an outpatient sleep study completed as soon as possible    Elevated alkaline phosphatase level  Assessment & Plan  · Secondary to unclear etiology  · Follow the total alkaline phosphatase level        VTE Pharmacologic Prophylaxis:   Pharmacologic: Enoxaparin (Lovenox) 1 mg/kg SQ every 12 hours  Mechanical VTE Prophylaxis in Place: No SCD's with a possible DVT of the lower extremities    Patient Centered Rounds: I have performed bedside rounds with nursing staff today  Time Spent for Care: 30 minutes  More than 50% of total time spent on counseling and coordination of care as described above  Current Length of Stay: 4 day(s)    Current Patient Status: Inpatient   Certification Statement: The patient will continue to require additional inpatient hospital stay due to the need for IV remdesivir treatment and for IV dexamethasone treatment  Code Status: Level 1 - Full Code      Subjective: The patient was seen and examined  The patient is doing better  The shortness of breath is improving  The generalized weakness has improved  No chest pain  Objective:     Vitals:   Temp (24hrs), Av 1 °F (36 7 °C), Min:98 °F (36 7 °C), Max:98 3 °F (36 8 °C)    Temp:  [98 °F (36 7 °C)-98 3 °F (36 8 °C)] 98 °F (36 7 °C)  HR:  [59-80] 67  Resp:  [18-19] 18  BP: (143-149)/(59-66) 146/66  SpO2:  [92 %-95 %] 93 %  Body mass index is 40 87 kg/m²       Input and Output Summary (last 24 hours): Intake/Output Summary (Last 24 hours) at 1/4/2021 1321  Last data filed at 1/4/2021 0600  Gross per 24 hour   Intake --   Output 500 ml   Net -500 ml       Physical Exam:     Physical Exam  General:  NAD, follows commands  HEENT:  NC/AT, mucous membranes moist  Neck:  Supple, No JVP elevation  CV:  + S1, + S2, RRR  Pulm:  Lung fields are CTA bilaterally  Abd:  Soft, Non-tender, Non-distended  Ext:  No clubbing/cyanosis/edema  Skin:  No rashes  Neuro:  Awake, alert, oriented  Psych:  Normal mood and affect      Additional Data:    Labs:    Results from last 7 days   Lab Units 01/04/21  0506   WBC Thousand/uL 5 92   HEMOGLOBIN g/dL 10 8*   HEMATOCRIT % 33 9*   PLATELETS Thousands/uL 249   NEUTROS PCT % 83*   LYMPHS PCT % 13*   MONOS PCT % 3*   EOS PCT % 0     Results from last 7 days   Lab Units 01/04/21  0506   SODIUM mmol/L 141   POTASSIUM mmol/L 3 7   CHLORIDE mmol/L 108   CO2 mmol/L 23   BUN mg/dL 25   CREATININE mg/dL 0 85   ANION GAP mmol/L 10   CALCIUM mg/dL 7 9*   ALBUMIN g/dL 2 7*   TOTAL BILIRUBIN mg/dL 0 40   ALK PHOS U/L 141*   ALT U/L 30   AST U/L 28   GLUCOSE RANDOM mg/dL 168*             Results from last 7 days   Lab Units 01/03/21  0519 01/01/21  0625   HEMOGLOBIN A1C % 6 0* 5 8*     Results from last 7 days   Lab Units 01/04/21  0506 01/03/21  0519 01/02/21  0529 01/01/21  0625 12/31/20  1325   PROCALCITONIN ng/ml 0 09 0 07 0 09 0 10 0 09           * I Have Reviewed All Lab Data Listed Above  * Additional Pertinent Lab Tests Reviewed:  April 66 Admission Reviewed      Recent Cultures (last 7 days):           Last 24 Hours Medication List:   Current Facility-Administered Medications   Medication Dose Route Frequency Provider Last Rate    albuterol  2 puff Inhalation Q4H PRN Kodak Kingston, DO      amLODIPine  2 5 mg Oral Daily Kodak Kingston, DO      ascorbic acid  1,000 mg Oral Q12H [address] Kodak Kingston, DO      cholecalciferol  2,000 Units Oral Daily Merly Allen Elliot,       dexamethasone  6 mg Intravenous Q24H Sheryle Marek, DO      enoxaparin  1 mg/kg Subcutaneous Q12H [address] Sheryle Marek, DO      famotidine  20 mg [facility] Daily [facility]      gabapentin  100 mg Oral BID [facility]      levothyroxine  100 mcg Oral Early Morning Sheryle Marek, DO      loratadine  10 mg Oral Daily [facility]      losartan  25 mg Oral Daily [facility]      montelukast  10 mg Oral Daily Sheryle Maxime, DO      zinc sulfate  220 mg Oral Daily Sheryle Marek,       Followed by   Louis Murdock ON 1/8/2021] multivitamin-minerals  1 tablet Oral Daily Sheryle Marek,       remdesivir  100 mg Intravenous Q24H Sheryle Marek, DO          Today, Patient Was Seen By: Sheryle Marek, DO    ** Please Note: Dictation voice to text software may have been used in the creation of this document   **

## 2021-01-04 NOTE — RESPIRATORY THERAPY NOTE
01/04/21 1046   Oxygen Therapy/Pulse Ox   O2 Device None (Room air)   O2 Therapy Room air   SpO2 93 %   SpO2 Activity At Rest   Oxygen On/Off (RCP) Discontinued   Oximetry Probe Site Changed No

## 2021-01-05 VITALS
WEIGHT: 238.1 LBS | OXYGEN SATURATION: 94 % | RESPIRATION RATE: 18 BRPM | TEMPERATURE: 98 F | HEART RATE: 95 BPM | HEIGHT: 64 IN | BODY MASS INDEX: 40.65 KG/M2 | DIASTOLIC BLOOD PRESSURE: 66 MMHG | SYSTOLIC BLOOD PRESSURE: 144 MMHG

## 2021-01-05 LAB
ALBUMIN SERPL BCP-MCNC: 2.6 G/DL (ref 3.5–5)
ALP SERPL-CCNC: 134 U/L (ref 46–116)
ALT SERPL W P-5'-P-CCNC: 28 U/L (ref 12–78)
ANION GAP SERPL CALCULATED.3IONS-SCNC: 7 MMOL/L (ref 4–13)
AST SERPL W P-5'-P-CCNC: 23 U/L (ref 5–45)
ATRIAL RATE: 88 BPM
BASOPHILS # BLD AUTO: 0.01 THOUSANDS/ΜL (ref 0–0.1)
BASOPHILS NFR BLD AUTO: 0 % (ref 0–1)
BILIRUB SERPL-MCNC: 0.4 MG/DL (ref 0.2–1)
BUN SERPL-MCNC: 25 MG/DL (ref 5–25)
CALCIUM ALBUM COR SERPL-MCNC: 9 MG/DL (ref 8.3–10.1)
CALCIUM SERPL-MCNC: 7.9 MG/DL (ref 8.3–10.1)
CHLORIDE SERPL-SCNC: 109 MMOL/L (ref 100–108)
CO2 SERPL-SCNC: 22 MMOL/L (ref 21–32)
CREAT SERPL-MCNC: 0.81 MG/DL (ref 0.6–1.3)
CRP SERPL QL: 9.5 MG/L
D DIMER PPP FEU-MCNC: 1.76 UG/ML FEU
EOSINOPHIL # BLD AUTO: 0 THOUSAND/ΜL (ref 0–0.61)
EOSINOPHIL NFR BLD AUTO: 0 % (ref 0–6)
ERYTHROCYTE [DISTWIDTH] IN BLOOD BY AUTOMATED COUNT: 14.2 % (ref 11.6–15.1)
FERRITIN SERPL-MCNC: 60 NG/ML (ref 8–388)
GFR SERPL CREATININE-BSD FRML MDRD: 71 ML/MIN/1.73SQ M
GLUCOSE SERPL-MCNC: 163 MG/DL (ref 65–140)
HCT VFR BLD AUTO: 33.8 % (ref 34.8–46.1)
HGB BLD-MCNC: 11.1 G/DL (ref 11.5–15.4)
IMM GRANULOCYTES # BLD AUTO: 0.08 THOUSAND/UL (ref 0–0.2)
IMM GRANULOCYTES NFR BLD AUTO: 1 % (ref 0–2)
LYMPHOCYTES # BLD AUTO: 0.8 THOUSANDS/ΜL (ref 0.6–4.47)
LYMPHOCYTES NFR BLD AUTO: 14 % (ref 14–44)
MAGNESIUM SERPL-MCNC: 2.1 MG/DL (ref 1.6–2.6)
MCH RBC QN AUTO: 28.7 PG (ref 26.8–34.3)
MCHC RBC AUTO-ENTMCNC: 32.8 G/DL (ref 31.4–37.4)
MCV RBC AUTO: 87 FL (ref 82–98)
MONOCYTES # BLD AUTO: 0.29 THOUSAND/ΜL (ref 0.17–1.22)
MONOCYTES NFR BLD AUTO: 5 % (ref 4–12)
NEUTROPHILS # BLD AUTO: 4.52 THOUSANDS/ΜL (ref 1.85–7.62)
NEUTS SEG NFR BLD AUTO: 80 % (ref 43–75)
NRBC BLD AUTO-RTO: 0 /100 WBCS
P AXIS: 54 DEGREES
PHOSPHATE SERPL-MCNC: 4.1 MG/DL (ref 2.3–4.1)
PLATELET # BLD AUTO: 252 THOUSANDS/UL (ref 149–390)
PMV BLD AUTO: 11.1 FL (ref 8.9–12.7)
POTASSIUM SERPL-SCNC: 4 MMOL/L (ref 3.5–5.3)
PR INTERVAL: 166 MS
PROCALCITONIN SERPL-MCNC: 0.07 NG/ML
PROT SERPL-MCNC: 5.9 G/DL (ref 6.4–8.2)
QRS AXIS: 45 DEGREES
QRSD INTERVAL: 76 MS
QT INTERVAL: 364 MS
QTC INTERVAL: 440 MS
RBC # BLD AUTO: 3.87 MILLION/UL (ref 3.81–5.12)
SODIUM SERPL-SCNC: 138 MMOL/L (ref 136–145)
T WAVE AXIS: 63 DEGREES
VENTRICULAR RATE: 88 BPM
WBC # BLD AUTO: 5.7 THOUSAND/UL (ref 4.31–10.16)

## 2021-01-05 PROCEDURE — 84145 PROCALCITONIN (PCT): CPT | Performed by: INTERNAL MEDICINE

## 2021-01-05 PROCEDURE — 86140 C-REACTIVE PROTEIN: CPT | Performed by: INTERNAL MEDICINE

## 2021-01-05 PROCEDURE — 83735 ASSAY OF MAGNESIUM: CPT | Performed by: INTERNAL MEDICINE

## 2021-01-05 PROCEDURE — 84100 ASSAY OF PHOSPHORUS: CPT | Performed by: INTERNAL MEDICINE

## 2021-01-05 PROCEDURE — 93010 ELECTROCARDIOGRAM REPORT: CPT | Performed by: INTERNAL MEDICINE

## 2021-01-05 PROCEDURE — 85025 COMPLETE CBC W/AUTO DIFF WBC: CPT | Performed by: INTERNAL MEDICINE

## 2021-01-05 PROCEDURE — 85379 FIBRIN DEGRADATION QUANT: CPT | Performed by: INTERNAL MEDICINE

## 2021-01-05 PROCEDURE — 80053 COMPREHEN METABOLIC PANEL: CPT | Performed by: INTERNAL MEDICINE

## 2021-01-05 PROCEDURE — 82728 ASSAY OF FERRITIN: CPT | Performed by: INTERNAL MEDICINE

## 2021-01-05 PROCEDURE — 99239 HOSP IP/OBS DSCHRG MGMT >30: CPT | Performed by: PHYSICIAN ASSISTANT

## 2021-01-05 RX ORDER — ZINC SULFATE 50(220)MG
220 CAPSULE ORAL DAILY
Qty: 2 CAPSULE | Refills: 0 | Status: SHIPPED | OUTPATIENT
Start: 2021-01-06 | End: 2021-01-15

## 2021-01-05 RX ORDER — PREDNISONE 20 MG/1
20 TABLET ORAL DAILY
Qty: 5 TABLET | Refills: 0 | Status: SHIPPED | OUTPATIENT
Start: 2021-01-05 | End: 2021-01-05 | Stop reason: SDUPTHER

## 2021-01-05 RX ORDER — ZINC SULFATE 50(220)MG
220 CAPSULE ORAL DAILY
Qty: 2 CAPSULE | Refills: 0 | Status: SHIPPED | OUTPATIENT
Start: 2021-01-06 | End: 2021-01-05

## 2021-01-05 RX ORDER — ALBUTEROL SULFATE 90 UG/1
2 AEROSOL, METERED RESPIRATORY (INHALATION) EVERY 4 HOURS PRN
Qty: 1 INHALER | Refills: 0 | Status: SHIPPED | OUTPATIENT
Start: 2021-01-05

## 2021-01-05 RX ORDER — PREDNISONE 20 MG/1
20 TABLET ORAL DAILY
Qty: 5 TABLET | Refills: 0
Start: 2021-01-05 | End: 2021-01-15

## 2021-01-05 RX ORDER — ALBUTEROL SULFATE 90 UG/1
2 AEROSOL, METERED RESPIRATORY (INHALATION) EVERY 4 HOURS PRN
Qty: 1 INHALER | Refills: 0 | Status: SHIPPED | OUTPATIENT
Start: 2021-01-05 | End: 2021-01-05

## 2021-01-05 RX ADMIN — LOSARTAN POTASSIUM 25 MG: 25 TABLET, FILM COATED ORAL at 08:06

## 2021-01-05 RX ADMIN — AMLODIPINE BESYLATE 2.5 MG: 2.5 TABLET ORAL at 08:06

## 2021-01-05 RX ADMIN — MONTELUKAST 10 MG: 10 TABLET, FILM COATED ORAL at 08:06

## 2021-01-05 RX ADMIN — LEVOTHYROXINE SODIUM 100 MCG: 100 TABLET ORAL at 05:36

## 2021-01-05 RX ADMIN — LORATADINE 10 MG: 10 TABLET ORAL at 08:05

## 2021-01-05 RX ADMIN — GABAPENTIN 100 MG: 100 CAPSULE ORAL at 08:06

## 2021-01-05 RX ADMIN — ENOXAPARIN SODIUM 105 MG: 120 INJECTION SUBCUTANEOUS at 08:05

## 2021-01-05 RX ADMIN — FAMOTIDINE 20 MG: 20 TABLET, FILM COATED ORAL at 08:05

## 2021-01-05 RX ADMIN — ZINC SULFATE 220 MG (50 MG) CAPSULE 220 MG: CAPSULE at 08:05

## 2021-01-05 RX ADMIN — Medication 2000 UNITS: at 08:05

## 2021-01-05 RX ADMIN — OXYCODONE HYDROCHLORIDE AND ACETAMINOPHEN 1000 MG: 500 TABLET ORAL at 08:05

## 2021-01-05 NOTE — CASE MANAGEMENT
A post acute care recommendation was made by your care team for [facility] 77  Discussed Freedom of Choice with patient  List of agencies given to patient over the phone as pt is COVID positive  patient aware the list is custom filtered for them by preference  and that [facility] post acute providers are designated  Referral [facility] for home PT

## 2021-01-05 NOTE — ASSESSMENT & PLAN NOTE
· Continue to Hold statin therapy for now  · The transaminitis has improved  · Follow the liver function tests on outpatient basis  Results from last 7 days   Lab Units 01/05/21  0550 01/04/21  0506 01/03/21  0519   SODIUM mmol/L 138 141 139   POTASSIUM mmol/L 4 0 3 7 3 8   CHLORIDE mmol/L 109* 108 106   CO2 mmol/L 22 23 21   BUN mg/dL 25 25 26*   CREATININE mg/dL 0 81 0 85 0 98   CALCIUM mg/dL 7 9* 7 9* 8 1*   ALK PHOS U/L 134* 141* 155*   ALT U/L 28 30 35   AST U/L 23 28 39     Results for Marilee March (MRN [id number]) as of 1/2/2021 12:13   Ref   Range 1/1/2021 06:25   HEPATITIS A IGM ANTIBODY Latest Ref Range: Non-reactive, Equivocal-Suggest Recollect  Non-reactive   HEPATITIS B SURFACE ANTIGEN Latest Ref Range: Non-reactive, NonReactive - Confirmed  Non-reactive   HEPATITIS B CORE IGM ANTIBODY Latest Ref Range: Non-reactive  Non-reactive   HEPATITIS C ANTIBODY Latest Ref Range: Non-reactive  Non-reactive

## 2021-01-05 NOTE — ASSESSMENT & PLAN NOTE
· Continue her home dose PO levothyroxine  · Recheck a TSH level in 1-2 weeks with her PCP    Results for Bharathi Duarte (MRN [id number]) as of 1/1/2021 11:36   Ref   Range 1/1/2021 06:25   TSH 3RD GENERATON Latest Ref Range: 0 358 - 3 740 uIU/mL 0 217 (L)

## 2021-01-05 NOTE — ASSESSMENT & PLAN NOTE
· Continue her home dose PO levothyroxine  · Recheck a TSH level in 1-2 weeks with her PCP    Results for Patrick Menjivar (MRN [id number]) as of 1/1/2021 11:36   Ref   Range 1/1/2021 06:25   TSH 3RD GENERATON Latest Ref Range: 0 358 - 3 740 uIU/mL 0 217 (L)

## 2021-01-05 NOTE — DISCHARGE SUMMARY
Discharge- Kim Ernandez August 1945, 67 y o  female MRN: [id number]    Unit/Bed#: 401-01 Encounter: [id number]    Primary Care Provider: DANIELLA Alva   Date and time admitted to hospital: 12/31/2020  1:12 PM        * Pneumonia due to COVID-19 virus  Assessment & Plan  · Required 3 lpm of continuous supplemental oxygen to maintain oxygen saturation levels at 92% and above at the time of admission  · The patient was successfully weaned off supplemental oxygen on 01/03/2021  · Received IV remdesivir for the full 5-day treatment course  · Received dexamethasone 6 mg IV every 24 hours - received a 5 day course  · Convalescent plasma has been ordered on 01/01/2021  · Initially treated with IV antibiotics - IV ceftriaxone and PO doxycycline - this was discontinued in the setting of multiple negative procalcitonin values  · Utilized ascorbic acid 1000 mg PO every 12 hours, cholecalciferol 2000 I  U  PO Qdaily, and zinc sulfate 220 mg PO Qdaily  · Held high-intensity statin therapy at this time secondary to transaminitis  · Utilized famotidine 20 mg PO Qdaily while here  · With the patient having bilateral pulmonary emboli, initially treated with an IV heparin drip/infusion per the VTE/PE scale (Raschke protocol), which was changed to lovenox 1 mg/kg SQ every 12 hours on 01/02/2021  · Follow inflammatory markers with a daily D-dimer level, a daily CRP level, and a daily ferritin level which showed improvement throughout her stay  · The patient was seen in consultation by Pulmonology throughout her stay  · Continued outpatient therapy will include PO prednisone for 5 more days; Zinc, vitamin C for 2 more days  A rescue inhaler will also be prescribed      Bilateral pulmonary embolism (HCC)  Assessment & Plan  · Likely secondary to COVID-19 pneumonia  · Initially treated with an IV heparin drip/infusion per the VTE/PE scale (Raschke protocol), which was changed to lovenox 1 mg/kg SQ every 12 hours on 01/02/2021  · Echocardiogram showing no evidence of right heart strain  · The patient was seen in consultation by Pulmonology  · To continue anticoagulation with Eliquis at 10mg PO BID x 7 days, then transition to 5mg PO BID thereafter  Diastolic dysfunction  Assessment & Plan  Noted on echocardiogram    Appears euvolemic on exam today  Outpatient follow up is appropriate  Hyperglycemia  Assessment & Plan  · Acute hyperglycemia likely Secondary to IV dexamethasone treatment  · However, the patient's A1c level is slightly elevated at 5 8  · Continue to Follow the blood glucose trend are outpatient basis    Results for Yaya Encinas (MRN [id number]) as of 1/3/2021 11:59   Ref  Range 1/1/2021 06:25   Hemoglobin A1C Latest Ref Range: Normal 3 8-5 6%; PreDiabetic 5 7-6 4%; Diabetic >=6 5%; Glycemic control for adults with diabetes <7 0% % 5 8 (H)   eAG, EST AVG Glucose Latest Units: mg/dl 120       Hyperchloremia  Assessment & Plan  · Resolved  · The NSS IV fluids were discontinued on 01/02/2021  Results from last 7 days   Lab Units 01/05/21  0550 01/04/21  0506 01/03/21  0519   SODIUM mmol/L 138 141 139   POTASSIUM mmol/L 4 0 3 7 3 8   CHLORIDE mmol/L 109* 108 106   CO2 mmol/L 22 23 21   BUN mg/dL 25 25 26*   CREATININE mg/dL 0 81 0 85 0 98   CALCIUM mg/dL 7 9* 7 9* 8 1*         Low TSH level  Assessment & Plan  · Continue her home dose PO levothyroxine  · Recheck a TSH level in 1-2 weeks with her PCP    Results for Yaya Encinas (MRN [id number]) as of 1/1/2021 11:36   Ref  Range 1/1/2021 06:25   TSH 3RD GENERATON Latest Ref Range: 0 358 - 3 740 uIU/mL 0 217 (L)       Hyponatremia  Assessment & Plan  · Mild hyponatremia  · Likely hypovolemic hyponatremia  · Resolved  · The NSS IV fluids were discontinued on 01/02/2021 in the setting of hyperchloremia        Results from last 7 days   Lab Units 01/05/21  0550 01/04/21  0506 01/03/21  0519   SODIUM mmol/L 138 141 139   POTASSIUM mmol/L 4 0 3 7 3 8 CHLORIDE mmol/L 109* 108 106   CO2 mmol/L 22 23 21   BUN mg/dL 25 25 26*   CREATININE mg/dL 0 81 0 85 0 98   CALCIUM mg/dL 7 9* 7 9* 8 1*         Transaminitis  Assessment & Plan  · Continue to Hold statin therapy for now  · The transaminitis has improved  · Follow the liver function tests on outpatient basis  Results from last 7 days   Lab Units 01/05/21  0550 01/04/21  0506 01/03/21  0519   SODIUM mmol/L 138 141 139   POTASSIUM mmol/L 4 0 3 7 3 8   CHLORIDE mmol/L 109* 108 106   CO2 mmol/L 22 23 21   BUN mg/dL 25 25 26*   CREATININE mg/dL 0 81 0 85 0 98   CALCIUM mg/dL 7 9* 7 9* 8 1*   ALK PHOS U/L 134* 141* 155*   ALT U/L 28 30 35   AST U/L 23 28 39     Results for Lamona Fix (MRN [id number]) as of 1/2/2021 12:13   Ref  Range 1/1/2021 06:25   HEPATITIS A IGM ANTIBODY Latest Ref Range: Non-reactive, Equivocal-Suggest Recollect  Non-reactive   HEPATITIS B SURFACE ANTIGEN Latest Ref Range: Non-reactive, NonReactive - Confirmed  Non-reactive   HEPATITIS B CORE IGM ANTIBODY Latest Ref Range: Non-reactive  Non-reactive   HEPATITIS C ANTIBODY Latest Ref Range: Non-reactive  Non-reactive       Hypokalemia  Assessment & Plan  · Resolved with potassium chloride supplementation treatment    Results from last 7 days   Lab Units 01/05/21  0550 01/04/21  0506 01/03/21  0519   SODIUM mmol/L 138 141 139   POTASSIUM mmol/L 4 0 3 7 3 8   CHLORIDE mmol/L 109* 108 106   CO2 mmol/L 22 23 21   BUN mg/dL 25 25 26*   CREATININE mg/dL 0 81 0 85 0 98   CALCIUM mg/dL 7 9* 7 9* 8 1*         Acquired hypothyroidism  Assessment & Plan  · Continue her home dose PO levothyroxine  · Recheck a TSH level in 1-2 weeks with her PCP    Results for Lamona Fix (MRN [id number]) as of 1/1/2021 11:36   Ref  Range 1/1/2021 06:25   TSH 3RD GENERATON Latest Ref Range: 0 358 - 3 740 uIU/mL 0 217 (L)       Lymphadenopathy  Assessment & Plan  · CTA of the chest/PE protocol (12/31/2020):  Mediastinal and hilar lymphadenopathy, likely reactive    A follow-up chest CT in 3 months is recommended to ensure resolution  · Outpatient follow-up with Pulmonology  · Will need an outpatient Hematology/Oncology evaluation if the lymphadenopathy persists on follow-up imaging  · Recheck an LDH level as an outpatient with PCP    Results for Yvonne Goins (MRN [id number]) as of 1/2/2021 12:13   Ref  Range 1/2/2021 05:29   LD (LDH) Latest Ref Range: 81 - 234 U/L 486 (H)       Hypercholesterolemia  Assessment & Plan  · Hold statin therapy at this time in the setting of transaminitis    Essential hypertension  Assessment & Plan  · Continue PO amlodipine and PO losartan  · Follow the blood pressure trend    Obstructive sleep apnea  Assessment & Plan  · Has probable obstructive sleep apnea per her PCP  · Was instructed to have a sleep study completed, which the patient has not yet done  · Needs an outpatient sleep study completed as soon as possible    Elevated alkaline phosphatase level  Assessment & Plan  · Secondary to unclear etiology  · Follow the total alkaline phosphatase level      Discharging Physician / Practitioner: Jacob Lopez PA-C  PCP: [id number], [address] [place] [address]  Admission Date:   Admission Orders (From admission, onward)     Ordered        12/31/20 1613  Inpatient Admission  Once                   Discharge Date: 01/05/21    Resolved Problems  Date Reviewed: 1/5/2021    None          Consultations During Hospital Stay:  · Pulmonology    Procedures Performed:   · None    Significant Findings / Test Results:   Cta Ed Chest Pe Study  Result Date: 12/31/2020  Impression: 1  Moderate bilateral acute pulmonary emboli  Measured RV/LV ratio is within normal limits at less than 0 9   2   Bilateral peripheral groundglass opacities consistent with COVID-19 pneumonia  3   Mediastinal and hilar lymphadenopathy, likely reactive  A follow-up chest CT in 3 months is recommended to ensure resolution    I personally discussed this study with CODY GRAHAM on 12/31/2020 at 3:53 PM  Workstation performed: [id number]     Echocardiogram SUMMARY: (please see full report for full exam details):  LEFT VENTRICLE:  Systolic function was normal  Ejection fraction was estimated to be 60 %  There were no regional wall motion abnormalities  Features were consistent with a pseudonormal left ventricular filling pattern, with concomitant abnormal relaxation and increased filling pressure (grade 2 diastolic dysfunction)  RIGHT VENTRICLE:  The size was normal   Systolic function was normal   TRICUSPID VALVE:  There was trace regurgitation              Incidental Findings:   Mediastinal and hilar lymphadenopathy, likely reactive  A follow-up chest CT in 3 months is recommended to ensure resolution  Test Results Pending at Discharge (will require follow up): · None     Outpatient Tests Requested:  · Follow-up TSH in 1-2 weeks  Complications:  None    Reason for Admission:  COVID-19 pneumonia  Hospital Course:   Eric Quintana is a 67 y o  female patient who originally presented to the hospital on 12/31/2020 due to Shortness of Breath (Since [place] has cough, congestion, shortness of breath, n/v/d, fever and chills)    The patient was found to be COVID positive with findings consistent with pneumonia and bilateral pulmonary embolism on CTA  She was also found to have acute hypoxic respiratory failure requiring 3 L oxygen supplementation upon initial presentation  The patient received IV remdesivir, IV dexamethasone, convalescent plasma, as well as other supportive treatment as noted above  For her pulmonary embolism, she was initially treated without IV heparin infusion which was transitioned to Lovenox 1 milligram/kilogram subcu every 12 hours  On discharge the patient will receive continued anticoagulation with Eliquis  Physical therapy evaluated the patient and determined the need for home physical therapy on discharge  Overall, she will be discharged home in stable condition  She will need close outpatient follow-up with her PCP  Please see above list of diagnoses and related plan for additional information  Condition at Discharge: good       Discharge Day Visit / Exam:   Subjective:  Patient feels well today  She notes I feel like I could get up in dance"  Shortness of breath has greatly improved  She is no longer requiring oxygen therapy  Vitals: Blood Pressure: 144/66 (01/05/21 0808)  Pulse: 95 (01/05/21 0808)  Temperature: 98 °F (36 7 °C) (01/05/21 0700)  Temp Source: Oral (01/05/21 0658)  Respirations: 18 (01/05/21 0658)  Height: 5' 4" (162 6 cm) (12/31/20 1748)  Weight - Scale: 108 kg (238 lb 1 6 oz) (01/05/21 0600)  SpO2: 94 % (01/05/21 8002)  Exam:   Physical Exam  Vitals signs and nursing note reviewed  Constitutional:       General: She is not in acute distress  Appearance: Normal appearance  She is not ill-appearing or toxic-appearing  HENT:      Head: Normocephalic  Mouth/Throat:      Mouth: Mucous membranes are moist    Neck:      Musculoskeletal: Neck supple  Cardiovascular:      Rate and Rhythm: Normal rate and regular rhythm  Pulmonary:      Effort: Pulmonary effort is normal       Breath sounds: Normal breath sounds  Abdominal:      General: There is no distension  Skin:     General: Skin is warm and dry  Neurological:      Mental Status: She is alert and oriented to person, place, and time  Psychiatric:         Mood and Affect: Mood normal            Discharge instructions/Information to patient and family:   See after visit summary for information provided to patient and family  Provisions for Follow-Up Care:  See after visit summary for information related to follow-up care and any pertinent home health orders  Disposition:   Home      Planned Readmission: no     Discharge Statement:  I spent 45 minutes discharging the patient  This time was spent on the day of discharge   I had direct contact with the patient on the day of discharge  Greater than 50% of the total time was spent examining patient, answering all patient questions, arranging and discussing plan of care with patient as well as directly providing post-discharge instructions  Additional time then spent on discharge activities  Discharge Medications:  See after visit summary for reconciled discharge medications provided to patient and family        ** Please Note: This note has been constructed using a voice recognition system **

## 2021-01-05 NOTE — ASSESSMENT & PLAN NOTE
· Acute hyperglycemia likely Secondary to IV dexamethasone treatment  · However, the patient's A1c level is slightly elevated at 5 8  · Continue to Follow the blood glucose trend are outpatient basis    Results for Niecy Jeffers (MRN [id number]) as of 1/3/2021 11:59   Ref  Range 1/1/2021 06:25   Hemoglobin A1C Latest Ref Range: Normal 3 8-5 6%; PreDiabetic 5 7-6 4%;  Diabetic >=6 5%; Glycemic control for adults with diabetes <7 0% % 5 8 (H)   eAG, EST AVG Glucose Latest Units: mg/dl 120

## 2021-01-05 NOTE — ASSESSMENT & PLAN NOTE
· Continue her home dose PO levothyroxine  · Recheck a TSH level in 1-2 weeks with her PCP    Results for Ashly Nava (MRN [id number]) as of 1/1/2021 11:36   Ref   Range 1/1/2021 06:25   TSH 3RD GENERATON Latest Ref Range: 0 358 - 3 740 uIU/mL 0 217 (L)

## 2021-01-05 NOTE — CASE MANAGEMENT
Pt's scripts sent over to [facility], waiting a call back re: cost  And  will deliver to the hospital this afternoon

## 2021-01-05 NOTE — ASSESSMENT & PLAN NOTE
· Acute hyperglycemia likely Secondary to IV dexamethasone treatment  · However, the patient's A1c level is slightly elevated at 5 8  · Continue to Follow the blood glucose trend are outpatient basis    Results for Matthew Coffman (MRN [id number]) as of 1/3/2021 11:59   Ref  Range 1/1/2021 06:25   Hemoglobin A1C Latest Ref Range: Normal 3 8-5 6%; PreDiabetic 5 7-6 4%;  Diabetic >=6 5%; Glycemic control for adults with diabetes <7 0% % 5 8 (H)   eAG, EST AVG Glucose Latest Units: mg/dl 120

## 2021-01-05 NOTE — ASSESSMENT & PLAN NOTE
· Continue to Hold statin therapy for now  · The transaminitis has improved  · Follow the liver function tests on outpatient basis  Results from last 7 days   Lab Units 01/05/21  0550 01/04/21  0506 01/03/21  0519   SODIUM mmol/L 138 141 139   POTASSIUM mmol/L 4 0 3 7 3 8   CHLORIDE mmol/L 109* 108 106   CO2 mmol/L 22 23 21   BUN mg/dL 25 25 26*   CREATININE mg/dL 0 81 0 85 0 98   CALCIUM mg/dL 7 9* 7 9* 8 1*   ALK PHOS U/L 134* 141* 155*   ALT U/L 28 30 35   AST U/L 23 28 39     Results for Bharathi Duarte (MRN [id number]) as of 1/2/2021 12:13   Ref   Range 1/1/2021 06:25   HEPATITIS A IGM ANTIBODY Latest Ref Range: Non-reactive, Equivocal-Suggest Recollect  Non-reactive   HEPATITIS B SURFACE ANTIGEN Latest Ref Range: Non-reactive, NonReactive - Confirmed  Non-reactive   HEPATITIS B CORE IGM ANTIBODY Latest Ref Range: Non-reactive  Non-reactive   HEPATITIS C ANTIBODY Latest Ref Range: Non-reactive  Non-reactive

## 2021-01-05 NOTE — ASSESSMENT & PLAN NOTE
· Required 3 lpm of continuous supplemental oxygen to maintain oxygen saturation levels at 92% and above at the time of admission  · The patient was successfully weaned off supplemental oxygen on 01/03/2021  · Received IV remdesivir for the full 5-day treatment course  · Received dexamethasone 6 mg IV every 24 hours - received a 5 day course  · Convalescent plasma has been ordered on 01/01/2021  · Initially treated with IV antibiotics - IV ceftriaxone and PO doxycycline - this was discontinued in the setting of multiple negative procalcitonin values  · Utilized ascorbic acid 1000 mg PO every 12 hours, cholecalciferol 2000 I  U  PO Qdaily, and zinc sulfate 220 mg PO Qdaily  · Held high-intensity statin therapy at this time secondary to transaminitis  · Utilized famotidine 20 mg PO Qdaily while here  · With the patient having bilateral pulmonary emboli, initially treated with an IV heparin drip/infusion per the VTE/PE scale (Raschke protocol), which was changed to lovenox 1 mg/kg SQ every 12 hours on 01/02/2021  · Follow inflammatory markers with a daily D-dimer level, a daily CRP level, and a daily ferritin level which showed improvement throughout her stay  · The patient was seen in consultation by Pulmonology throughout her stay  · Continued outpatient therapy will include PO prednisone for 5 more days; Zinc, vitamin C for 2 more days  A rescue inhaler will also be prescribed

## 2021-01-05 NOTE — ASSESSMENT & PLAN NOTE
· CTA of the chest/PE protocol (12/31/2020):  Mediastinal and hilar lymphadenopathy, likely reactive  A follow-up chest CT in 3 months is recommended to ensure resolution  · Outpatient follow-up with Pulmonology  · Will need an outpatient Hematology/Oncology evaluation if the lymphadenopathy persists on follow-up imaging  · Recheck an LDH level as an outpatient with PCP    Results for Umm Little (MRN [id number]) as of 1/2/2021 12:13   Ref   Range 1/2/2021 05:29   LD (LDH) Latest Ref Range: 81 - 234 U/L 486 (H)

## 2021-01-05 NOTE — ASSESSMENT & PLAN NOTE
· Required 3 lpm of continuous supplemental oxygen to maintain oxygen saturation levels at 92% and above at the time of admission  · The patient was successfully weaned off supplemental oxygen on 01/03/2021  · Received IV remdesivir for the full 5-day treatment course  · Received dexamethasone 6 mg IV every 24 hours - received a 5 day course  · Convalescent plasma has been ordered on 01/01/2021  · Initially treated with IV antibiotics - IV ceftriaxone and PO doxycycline - this was discontinued in the setting of multiple negative procalcitonin values  · Utilized ascorbic acid 1000 mg PO every 12 hours, cholecalciferol 2000 I  U  PO Qdaily, and zinc sulfate 220 mg PO Qdaily  · Held high-intensity statin therapy at this time secondary to transaminitis  · Utilized famotidine 20 mg PO Qdaily while here  · With the patient having bilateral pulmonary emboli, initially treated with an IV heparin drip/infusion per the VTE/PE scale (Raschke protocol), which was changed to lovenox 1 mg/kg SQ every 12 hours on 01/02/2021  · Follow inflammatory markers with a daily D-dimer level, a daily CRP level, and a daily ferritin level which showed improvement throughout her stay  · The patient was seen in consultation by Pulmonology throughout her stay

## 2021-01-05 NOTE — ASSESSMENT & PLAN NOTE
· CTA of the chest/PE protocol (12/31/2020):  Mediastinal and hilar lymphadenopathy, likely reactive  A follow-up chest CT in 3 months is recommended to ensure resolution  · Outpatient follow-up with Pulmonology  · Will need an outpatient Hematology/Oncology evaluation if the lymphadenopathy persists on follow-up imaging  · Recheck an LDH level as an outpatient with PCP    Results for eDe Wall (MRN [id number]) as of 1/2/2021 12:13   Ref   Range 1/2/2021 05:29   LD (LDH) Latest Ref Range: 81 - 234 U/L 486 (H)

## 2021-01-05 NOTE — ASSESSMENT & PLAN NOTE
· Continue her home dose PO levothyroxine  · Recheck a TSH level in 1-2 weeks with her PCP    Results for Demario Riley (MRN [id number]) as of 1/1/2021 11:36   Ref   Range 1/1/2021 06:25   TSH 3RD GENERATON Latest Ref Range: 0 358 - 3 740 uIU/mL 0 217 (L)

## 2021-01-05 NOTE — ASSESSMENT & PLAN NOTE
· Resolved with potassium chloride supplementation treatment    Results from last 7 days   Lab Units 01/05/21  0550 01/04/21  0506 01/03/21  0519   SODIUM mmol/L 138 141 139   POTASSIUM mmol/L 4 0 3 7 3 8   CHLORIDE mmol/L 109* 108 106   CO2 mmol/L 22 23 21   BUN mg/dL 25 25 26*   CREATININE mg/dL 0 81 0 85 0 98   CALCIUM mg/dL 7 9* 7 9* 8 1*

## 2021-01-05 NOTE — ASSESSMENT & PLAN NOTE
· Mild hyponatremia  · Likely hypovolemic hyponatremia  · Resolved  · The NSS IV fluids were discontinued on 01/02/2021 in the setting of hyperchloremia        Results from last 7 days   Lab Units 01/05/21  0550 01/04/21  0506 01/03/21  0519   SODIUM mmol/L 138 141 139   POTASSIUM mmol/L 4 0 3 7 3 8   CHLORIDE mmol/L 109* 108 106   CO2 mmol/L 22 23 21   BUN mg/dL 25 25 26*   CREATININE mg/dL 0 81 0 85 0 98   CALCIUM mg/dL 7 9* 7 9* 8 1*

## 2021-01-05 NOTE — CASE MANAGEMENT
[facility] will be here around 4:30-5:00pm and cost is $3 15  Pt's spouse to transport her home around 5:30pm      [facility] (PT) accepted pt

## 2021-01-05 NOTE — ASSESSMENT & PLAN NOTE
· Likely secondary to COVID-19 pneumonia  · Initially treated with an IV heparin drip/infusion per the VTE/PE scale (Raschke protocol), which was changed to lovenox 1 mg/kg SQ every 12 hours on 01/02/2021  · Echocardiogram showing no evidence of right heart strain  · The patient was seen in consultation by Pulmonology  · To continue anticoagulation with Eliquis at 10mg PO BID x 7 days, then transition to 5mg PO BID thereafter

## 2021-01-05 NOTE — ASSESSMENT & PLAN NOTE
· Resolved  · The NSS IV fluids were discontinued on 01/02/2021        Results from last 7 days   Lab Units 01/05/21  0550 01/04/21  0506 01/03/21  0519   SODIUM mmol/L 138 141 139   POTASSIUM mmol/L 4 0 3 7 3 8   CHLORIDE mmol/L 109* 108 106   CO2 mmol/L 22 23 21   BUN mg/dL 25 25 26*   CREATININE mg/dL 0 81 0 85 0 98   CALCIUM mg/dL 7 9* 7 9* 8 1*

## 2021-01-05 NOTE — DISCHARGE INSTRUCTIONS

## 2021-01-06 ENCOUNTER — TELEPHONE (OUTPATIENT)
Dept: INTERNAL MEDICINE CLINIC | Facility: CLINIC | Age: 76
End: 2021-01-06

## 2021-01-06 NOTE — TELEPHONE ENCOUNTER
Jacqueline from PT called  She stated she tried to contact [facility] several times to arrange PT, They did not answer or return her calls  She also tried to stop at [facility] and they did not answer  She will be closing their PT case  If they decide to go with home PT, we are to put the orders in

## 2021-01-07 ENCOUNTER — TRANSITIONAL CARE MANAGEMENT (OUTPATIENT)
Dept: INTERNAL MEDICINE CLINIC | Facility: CLINIC | Age: 76
End: 2021-01-07

## 2021-01-07 NOTE — UTILIZATION REVIEW
Notification of Discharge  This is a Notification of Discharge from our facility [facility]  Please be advised that this patient has been discharge from our facility  Below you will find the admission and discharge date and time including the patients disposition  PRESENTATION DATE: 12/31/2020  1:12 PM  OBS ADMISSION DATE:   IP ADMISSION DATE: 12/31/20 1613   DISCHARGE DATE: 1/5/2021  6:15 PM  [facility]   Admission Orders listed below:  Admission Orders (From admission, onward)     Ordered        12/31/20 1613  Inpatient Admission  Once                   Please contact the UR Department if additional information is required to close this patient's authorization/case  [facility] Utilization Review Department  Main: [phone] x carefully listen to the prompts  All voicemails are confidential   [email or web address]  Send all requests for admission clinical reviews, approved or denied determinations and any other requests to dedicated fax number below belonging to the campus where the patient is receiving treatment   List of dedicated fax numbers:  [address] DENIALS (Administrative/Medical Necessity) [phone]   [address] (Maternity/NICU/Pediatrics) [phone]   Elizabeth Schumacher [phone]   Gricelda Lozoya [phone]   Romy Bal [phone]   [facility] [address] [facility] [phone]   [facility]  [phone]   [address] [facility] [address]  [address] [facility] [address] [facility] [phone]

## 2021-01-08 ENCOUNTER — TELEPHONE (OUTPATIENT)
Dept: INTERNAL MEDICINE CLINIC | Facility: CLINIC | Age: 76
End: 2021-01-08

## 2021-01-08 NOTE — TELEPHONE ENCOUNTER
Received a call from Jacqueline santiago stating that [facility] doesn't have any of the following meds at home: D3, singular, zyrtic, losartan, and Flonase  Has something on her buttocks, rash some is blanchable some is not  The nurse will assess it when she goes out  Using barrier cream on the area  They would like to have the medications sent to [facility] in [place] because they are delivering right now

## 2021-01-11 DIAGNOSIS — B02.9 HERPES ZOSTER WITHOUT COMPLICATION: ICD-10-CM

## 2021-01-11 DIAGNOSIS — I10 ESSENTIAL HYPERTENSION: ICD-10-CM

## 2021-01-11 RX ORDER — LOSARTAN POTASSIUM 25 MG/1
25 TABLET ORAL DAILY
Qty: 90 TABLET | Refills: 3 | Status: SHIPPED | OUTPATIENT
Start: 2021-01-11 | End: 2021-03-29 | Stop reason: SDUPTHER

## 2021-01-11 RX ORDER — LOSARTAN POTASSIUM 25 MG/1
25 TABLET ORAL DAILY
Qty: 90 TABLET | Refills: 3 | Status: SHIPPED | OUTPATIENT
Start: 2021-01-11 | End: 2021-01-11 | Stop reason: SDUPTHER

## 2021-01-11 RX ORDER — AMLODIPINE BESYLATE 2.5 MG/1
2.5 TABLET ORAL DAILY
Qty: 90 TABLET | Refills: 3 | Status: SHIPPED | OUTPATIENT
Start: 2021-01-11 | End: 2021-03-29 | Stop reason: SDUPTHER

## 2021-01-11 NOTE — TELEPHONE ENCOUNTER
Received a call from Jacqueline from home PT, she states that they went over [facility]'s med list and they have interactions coming up    Eliquis and Voltaren and Advil    Per Margarette Erick should stop the Voltaren and Advil while on Eliquis     Also she is taking an OTC cold and cough PRN  Did call Art Simmons and lmom nurse  Did receive a call from   Pt doesn't have losartan, hasn't had it         She has a sacral slit, they are applying barrier cream

## 2021-01-15 ENCOUNTER — OFFICE VISIT (OUTPATIENT)
Dept: INTERNAL MEDICINE CLINIC | Facility: CLINIC | Age: 76
End: 2021-01-15
Payer: COMMERCIAL

## 2021-01-15 DIAGNOSIS — R59.1 LYMPHADENOPATHY: ICD-10-CM

## 2021-01-15 DIAGNOSIS — J12.82 PNEUMONIA DUE TO COVID-19 VIRUS: Primary | ICD-10-CM

## 2021-01-15 DIAGNOSIS — I26.99 BILATERAL PULMONARY EMBOLISM (HCC): ICD-10-CM

## 2021-01-15 DIAGNOSIS — U07.1 PNEUMONIA DUE TO COVID-19 VIRUS: Primary | ICD-10-CM

## 2021-01-15 DIAGNOSIS — I47.1 SVT (SUPRAVENTRICULAR TACHYCARDIA) (HCC): ICD-10-CM

## 2021-01-15 DIAGNOSIS — I10 ESSENTIAL HYPERTENSION: ICD-10-CM

## 2021-01-15 PROBLEM — B02.9 HERPES ZOSTER WITHOUT COMPLICATION: Status: RESOLVED | Noted: 2020-10-13 | Resolved: 2021-01-15

## 2021-01-15 PROCEDURE — 1111F DSCHRG MED/CURRENT MED MERGE: CPT | Performed by: NURSE PRACTITIONER

## 2021-01-15 PROCEDURE — 99496 TRANSJ CARE MGMT HIGH F2F 7D: CPT | Performed by: NURSE PRACTITIONER

## 2021-01-15 NOTE — PROGRESS NOTES
Assessment/Plan: Patient is continuing with Zinc, Vitamin C, and Vitamin D  Continuing with Eliquis 5 mg BID and is seeing Pulmonary on the 19th  Did advise to continue to ambulate and stay well hydrated  Has since completed Prednisone and will recheck labs again in one month  A1C is at 5 8 and did advise to watch carb and sugar intake  Cardiac work up was negative  Did advised if any issues to call the office and will follow up sooner  Problem List Items Addressed This Visit        Respiratory    Pneumonia due to COVID-19 virus - Primary       Cardiovascular and Mediastinum    SVT (supraventricular tachycardia) ([facility])    Essential hypertension    Bilateral pulmonary embolism ([address] )       Immune and Lymphatic    Lymphadenopathy             Reason for visit is [facility]    Encounter provider Ilia Horta, [facility]        Provider located at [address] [address] [facility] [id number]      Recent Visits  Date Type Provider Dept   01/08/21 Telephone Isela Medina MA [facility] [place]   Showing recent visits within past 7 days and meeting all other requirements     Today's Visits  Date Type Provider Dept   01/15/21 Office Visit DANIELLA Lawler [facility] [place]   Showing today's visits and meeting all other requirements     Future Appointments  No visits were found meeting these conditions  Showing future appointments within next 150 days and meeting all other requirements        After connecting through [employer], the patient was identified by name and date of birth  Fercho Medrano August was informed that this is a telemedicine visit and that the visit is being conducted through [email or web address] and patient was informed that this is a secure, HIPAA-compliant platform  She agrees to proceed     My office door was closed  No one else was in the room    She acknowledged consent and understanding of privacy and security of the video platform  The patient has agreed to participate and understands they can discontinue the visit at any time  Patient is aware this is a billable service  Below is the patient's most recent value for Albumin, ALT, AST, BUN, Calcium, Chloride, Cholesterol, CO2, Creatinine, GFR, Glucose, HDL, Hematocrit, Hemoglobin, Hemoglobin A1C, LDL, Magnesium, Phosphorus, Platelets, Potassium, PSA, Sodium, Triglycerides, and WBC  Lab Results   Component Value Date    ALT 28 01/05/2021    AST 23 01/05/2021    BUN 25 01/05/2021    CALCIUM 7 9 (L) 01/05/2021     (H) 01/05/2021    CHOL 200 02/17/2015    CO2 22 01/05/2021    CREATININE 0 81 01/05/2021    HDL 61 08/07/2020    HCT 33 8 (L) 01/05/2021    HGB 11 1 (L) 01/05/2021    HGBA1C 6 0 (H) 01/03/2021    MG 2 1 01/05/2021    PHOS 4 1 01/05/2021     01/05/2021    K 4 0 01/05/2021     02/17/2015    TRIG 139 08/07/2020    WBC 5 70 01/05/2021     Note: for a comprehensive list of the patient's lab results, access the Results Review activity  Subjective:     Patient ID: Los Quintana is a 67 y o  female  Los Mendoza is for a BELEM visit  She was seen in the office on 12/31 and was found to be very SOB and hypoxic  She was sent to the ER and was then admitted for COVID pneumonia and BL pulmonary emboli  She was then discharged on 1/5  She was on Heparin and Lovenox in the hospital and was sent home on Eliquis  She was treated with IV antibiotics and IV Remdesivir  She is taking Eliquis 5 mg BID and states she is feeling great  She does see Pulmonary on the 19th  She states PT is coming out twice a week and feels she is doing so good she does not need them  She is staying well hydrated and is having quite an appetite  She states she did not thinks she was as sick as she was  She is getting great care from her   She is taking her vitamins prescribed to her in the hospital  She offers no other issues        Review of Systems   Constitutional: Positive for fatigue  HENT: Negative  Eyes: Negative  Respiratory: Negative  Cardiovascular: Negative  Gastrointestinal: Negative  Endocrine: Negative  Musculoskeletal: Negative  Skin: Negative  Allergic/Immunologic: Negative  Neurological: Negative  Hematological: Negative  Psychiatric/Behavioral: Negative  Current Outpatient Medications:     albuterol (PROVENTIL HFA,VENTOLIN HFA) 90 mcg/act inhaler, Inhale 2 puffs every 4 (four) hours as needed for wheezing, Disp: 1 Inhaler, Rfl: 0    apixaban (ELIQUIS) 5 mg, Take 2 tablets (10mg) twice daily for 7 days, then 1 tablet (5mg) twice daily thereafter , Disp: 74 tablet, Rfl: 0    levothyroxine 100 mcg tablet, Take 1 tablet (100 mcg total) by mouth daily, Disp: 30 tablet, Rfl: 3    losartan (COZAAR) 25 mg tablet, Take 1 tablet (25 mg total) by mouth daily, Disp: 90 tablet, Rfl: 3    omeprazole (PriLOSEC) 20 mg delayed release capsule, Take 1 capsule (20 mg total) by mouth daily, Disp: 90 capsule, Rfl: 3    amLODIPine (NORVASC) 2 5 mg tablet, Take 1 tablet (2 5 mg total) by mouth daily, Disp: 90 tablet, Rfl: 3    ibuprofen (Advil) 200 mg tablet, Take by mouth every 6 (six) hours as needed for mild pain, Disp: , Rfl:   Objective: There were no vitals filed for this visit  Physical Exam  Constitutional:       Appearance: Normal appearance  Neurological:      Mental Status: She is alert  Psychiatric:         Mood and Affect: Mood normal          Behavior: Behavior normal          Thought Content: Thought content normal          Judgment: Judgment normal              Transitional Care Management Review:  Josiah Quintana is a 67 y o  female here for TCM follow up       During the TCM phone call patient stated:    TCM Call (since 12/15/2020)     Date and time call was made  1/7/2021  3:09 PM    Hospital care reviewed  Records reviewed    Patient was hospitialized at  [address] [place] [address]        Date of Admission  12/31/20    Date of discharge  01/05/21    Diagnosis  Pneumonia due to COVID-19 virus      TCM Call (since 12/15/2020)     Did you obtain your prescribed medications  Yes    Do you need help managing your prescriptions or medications  No    Is transportation to your appointment needed  No    I have advised the patient to call PCP with any new or worsening symptoms  [address] or [address] other    Support System  Spouse    The type of support provided  Emotional; Financial; Physical    Do you have social support  Yes, as much as I need    Are you recieving any outpatient services  No    Have you fallen in the last 12 months  No    Interperter language line needed  No          I spent 20 minutes with the patient during this visit      Jill Alicea

## 2021-01-19 ENCOUNTER — OFFICE VISIT (OUTPATIENT)
Dept: PULMONOLOGY | Facility: CLINIC | Age: 76
End: 2021-01-19
Payer: COMMERCIAL

## 2021-01-19 ENCOUNTER — LAB (OUTPATIENT)
Dept: LAB | Facility: HOSPITAL | Age: 76
End: 2021-01-19
Payer: COMMERCIAL

## 2021-01-19 ENCOUNTER — TELEPHONE (OUTPATIENT)
Dept: PULMONOLOGY | Facility: CLINIC | Age: 76
End: 2021-01-19

## 2021-01-19 VITALS
TEMPERATURE: 96.7 F | HEIGHT: 64 IN | DIASTOLIC BLOOD PRESSURE: 79 MMHG | BODY MASS INDEX: 39.27 KG/M2 | OXYGEN SATURATION: 94 % | HEART RATE: 98 BPM | WEIGHT: 230 LBS | SYSTOLIC BLOOD PRESSURE: 140 MMHG

## 2021-01-19 DIAGNOSIS — I26.99 BILATERAL PULMONARY EMBOLISM (HCC): ICD-10-CM

## 2021-01-19 DIAGNOSIS — E03.9 HYPOTHYROIDISM, UNSPECIFIED TYPE: ICD-10-CM

## 2021-01-19 DIAGNOSIS — U07.1 PNEUMONIA DUE TO COVID-19 VIRUS: ICD-10-CM

## 2021-01-19 DIAGNOSIS — R59.1 LYMPHADENOPATHY: ICD-10-CM

## 2021-01-19 DIAGNOSIS — J12.82 PNEUMONIA DUE TO COVID-19 VIRUS: ICD-10-CM

## 2021-01-19 LAB
D DIMER PPP FEU-MCNC: 0.56 UG/ML FEU
T4 FREE SERPL-MCNC: 1.26 NG/DL (ref 0.76–1.46)
TSH SERPL DL<=0.05 MIU/L-ACNC: 0.18 UIU/ML (ref 0.36–3.74)

## 2021-01-19 PROCEDURE — 3078F DIAST BP <80 MM HG: CPT | Performed by: PHYSICIAN ASSISTANT

## 2021-01-19 PROCEDURE — 99214 OFFICE O/P EST MOD 30 MIN: CPT | Performed by: PHYSICIAN ASSISTANT

## 2021-01-19 PROCEDURE — 36415 COLL VENOUS BLD VENIPUNCTURE: CPT

## 2021-01-19 PROCEDURE — 84443 ASSAY THYROID STIM HORMONE: CPT

## 2021-01-19 PROCEDURE — 84439 ASSAY OF FREE THYROXINE: CPT

## 2021-01-19 PROCEDURE — 85379 FIBRIN DEGRADATION QUANT: CPT

## 2021-01-19 PROCEDURE — 1036F TOBACCO NON-USER: CPT | Performed by: PHYSICIAN ASSISTANT

## 2021-01-19 PROCEDURE — 3077F SYST BP >= 140 MM HG: CPT | Performed by: PHYSICIAN ASSISTANT

## 2021-01-19 PROCEDURE — 1111F DSCHRG MED/CURRENT MED MERGE: CPT | Performed by: PHYSICIAN ASSISTANT

## 2021-01-19 PROCEDURE — 1160F RVW MEDS BY RX/DR IN RCRD: CPT | Performed by: PHYSICIAN ASSISTANT

## 2021-01-19 NOTE — ASSESSMENT & PLAN NOTE
Progressing well  No need for additional COVID directed therapies at this time  Med list was reviewed  Will continue Eliquis for minimum 3-6 months given acute VTE  Will plan to repeat D-dimer now to ensure improving/resolution inflammation

## 2021-01-19 NOTE — ASSESSMENT & PLAN NOTE
CTA chest PE study reviewed with patient and patient's  today in the office  Will plan to repeat CT in 3 months to ensure resolution of mediastinal and hilar lymphadenopathy

## 2021-01-19 NOTE — ASSESSMENT & PLAN NOTE
Likely provoked in the setting of COVID-19 pneumonia  Will continue Eliquis 5 mg p o  Daily  No overt signs of bleeding on exam or based on symptoms  Minimum anticoagulation for 3-6 months  Will plan to repeat echocardiogram in 3 months though there is no evidence of right heart strain or pulmonary hypertension on last 1 reviewed with her in the office

## 2021-01-19 NOTE — PROGRESS NOTES
Pulmonary Follow Up Note   [facility] August 75 y o  female MRN: [id number]  1/19/2021      Assessment:    Bilateral pulmonary embolism ([address] )  Likely provoked in the setting of COVID-19 pneumonia  Will continue Eliquis 5 mg p o  Daily  No overt signs of bleeding on exam or based on symptoms  Minimum anticoagulation for 3-6 months  Will plan to repeat echocardiogram in 3 months though there is no evidence of right heart strain or pulmonary hypertension on last 1 reviewed with her in the office  Pneumonia due to COVID-19 virus  Progressing well  No need for additional COVID directed therapies at this time  Med list was reviewed  Will continue Eliquis for minimum 3-6 months given acute VTE  Will plan to repeat D-dimer now to ensure improving/resolution inflammation  Lymphadenopathy  CTA chest PE study reviewed with patient and patient's  today in the office  Will plan to repeat CT in 3 months to ensure resolution of mediastinal and hilar lymphadenopathy  Plan:    Diagnoses and all orders for this visit:    Bilateral pulmonary embolism ([address] )  -     Ambulatory referral to Pulmonology  -     Echo complete with contrast if indicated; Future  -     D-dimer, quantitative; Future    Pneumonia due to COVID-19 virus  -     Ambulatory referral to Pulmonology  -     CT chest wo contrast; Future  -     Echo complete with contrast if indicated; Future  -     D-dimer, quantitative; Future    Lymphadenopathy  -     Ambulatory referral to Pulmonology  -     CT chest wo contrast; Future        Return in about 3 months (around 4/19/2021), or if symptoms worsen or fail to improve  History of Present Illness   HPI:  [facility] August is a 67 y o  female who presents to the office today for hospital follow-up  Patient's past medical history positive for allergic rhinitis, hypertension, hypothyroidism and morbid obesity    She was hospitalized 12/31/2021 to 01/05/2020 for acute hypoxic respiratory failure secondary to COVID-19 pneumonia and bilateral PE  Patient symptoms included worsening shortness of breath, malaise, fatigue, nonproductive cough and diarrhea without fevers or chills  While hospitalized she had elevated inflammatory markers hypokalemia and hyperglycemia  She has CTA chest PE study that showed moderate bilateral acute pulmonary emboli and bilateral peripheral ground-glass opacities consistent with COVID-19 pneumonia mediastinal hilar lymphadenopathy likely reactive  Patient usually requires 3 L nasal cannula but was able to be transitioned to room air  She initially required heparin drip and then was transition to full-dose Eliquis at time of discharge  Since discharge, patient reports that her symptoms improving  She denies shortness of breath at rest or currently but does admit to dyspnea on exertion when walking  She also reports a chronic allergic type cough which is normal for her without sputum production or hemoptysis  Denies wheezing, chest pain, chest tightness, palpitations, pleurisy  She reports some mild lower extremity edema but when questioned further she reports that this is swelling in her knees related to arthritis  Patient denies fevers chills, loss of taste or smell  No GI symptoms  Review of Systems   HENT: Positive for postnasal drip (chronic) and rhinorrhea (chronic)  Respiratory: Positive for cough (chronic)  All other systems reviewed and are negative        Historical Information   Past Medical History:   Diagnosis Date    Allergic     COVID-19     Hypertension     last assessed 5/4/15     Hypothyroid      Past Surgical History:   Procedure Laterality Date    DILATION AND CURETTAGE OF UTERUS      GALLBLADDER SURGERY       Family History   Problem Relation Age of Onset    Emphysema Mother     Emphysema Father        Social History     Tobacco Use   Smoking Status Never Smoker   Smokeless Tobacco Never Used         Meds/Allergies     Current Outpatient Medications:     amLODIPine (NORVASC) 2 5 mg tablet, Take 1 tablet (2 5 mg total) by mouth daily, Disp: 90 tablet, Rfl: 3    apixaban (ELIQUIS) 5 mg, Take 2 tablets (10mg) twice daily for 7 days, then 1 tablet (5mg) twice daily thereafter , Disp: 74 tablet, Rfl: 0    levothyroxine 100 mcg tablet, Take 1 tablet (100 mcg total) by mouth daily, Disp: 30 tablet, Rfl: 3    losartan (COZAAR) 25 mg tablet, Take 1 tablet (25 mg total) by mouth daily, Disp: 90 tablet, Rfl: 3    omeprazole (PriLOSEC) 20 mg delayed release capsule, Take 1 capsule (20 mg total) by mouth daily, Disp: 90 capsule, Rfl: 3    albuterol (PROVENTIL HFA,VENTOLIN HFA) 90 mcg/act inhaler, Inhale 2 puffs every 4 (four) hours as needed for wheezing (Patient not taking: Reported on 1/19/2021), Disp: 1 Inhaler, Rfl: 0    ibuprofen (Advil) 200 mg tablet, Take by mouth every 6 (six) hours as needed for mild pain, Disp: , Rfl:   Allergies   Allergen Reactions    Honey Tussin [Dextromethorphan] Anaphylaxis    Nuts Anaphylaxis     Almonds    Penicillins Itching    Shrimp Extract Allergy Skin Test Anaphylaxis       Vitals: Blood pressure 140/79, pulse 98, temperature (!) 96 7 °F (35 9 °C), temperature source Tympanic, height 5' 4" (1 626 m), weight 104 kg (230 lb), SpO2 94 %  Body mass index is 39 48 kg/m²  Oxygen Therapy  SpO2: 94 %    Physical Exam  Physical Exam  Vitals signs reviewed  Constitutional:       Appearance: Normal appearance  She is well-developed  She is obese  HENT:      Head: Normocephalic and atraumatic  Right Ear: External ear normal       Left Ear: External ear normal    Eyes:      Extraocular Movements: Extraocular movements intact  Pupils: Pupils are equal, round, and reactive to light  Neck:      Musculoskeletal: Normal range of motion and neck supple  Cardiovascular:      Rate and Rhythm: Normal rate and regular rhythm  Pulses: Normal pulses  Heart sounds: Normal heart sounds  No murmur     Pulmonary: Effort: Pulmonary effort is normal  No respiratory distress  Breath sounds: Normal breath sounds  No stridor  No wheezing, rhonchi or rales  Abdominal:      Palpations: Abdomen is soft  Tenderness: There is no abdominal tenderness  Hernia: No hernia is present  Musculoskeletal: Normal range of motion  General: No swelling, tenderness or deformity  Skin:     General: Skin is warm and dry  Capillary Refill: Capillary refill takes less than 2 seconds  Neurological:      General: No focal deficit present  Mental Status: She is alert and oriented to person, place, and time  Mental status is at baseline  Psychiatric:         Behavior: Behavior normal          Thought Content: Thought content normal          Judgment: Judgment normal          Labs: I have personally reviewed pertinent lab results  , ABG: No results found for: PHART, FAO8GNL, PO2ART, BDH2WVG, N7IOTYBJ, BEART, SOURCE, BNP: No results found for: BNP, CBC: No results found for: WBC, HGB, HCT, MCV, PLT, ADJUSTEDWBC, MCH, MCHC, RDW, MPV, NRBC, CMP: No results found for: SODIUM, K, CL, CO2, ANIONGAP, BUN, CREATININE, GLUCOSE, CALCIUM, AST, ALT, ALKPHOS, PROT, BILITOT, EGFR, PT/INR: No results found for: PT, INR, Troponin: No results found for: TROPONINI  Lab Results   Component Value Date    WBC 5 70 01/05/2021    HGB 11 1 (L) 01/05/2021    HCT 33 8 (L) 01/05/2021    MCV 87 01/05/2021     01/05/2021     Lab Results   Component Value Date    GLUCOSE 100 02/17/2015    CALCIUM 7 9 (L) 01/05/2021     02/17/2015    K 4 0 01/05/2021    CO2 22 01/05/2021     (H) 01/05/2021    BUN 25 01/05/2021    CREATININE 0 81 01/05/2021     No results found for: IGE  Lab Results   Component Value Date    ALT 28 01/05/2021    AST 23 01/05/2021    ALKPHOS 134 (H) 01/05/2021    BILITOT 0 40 03/31/2015       Imaging and other studies: I have personally reviewed pertinent reports     and I have personally reviewed pertinent films in PACS     CTA chest PE study 12/31/2020  Moderate bilateral pulmonary emboli with RV/LV ratio within normal limits at less than 0 9  Bilateral peripheral ground-glass opacities consistent with COVID-19 pneumonia  Mediastinal and hilar lymphadenopathy, likely reactive  Pulmonary function testing:  None to review       Other Studies: I have personally reviewed pertinent reports  Echocardiogram 01/04/2021  EF 60%  Wall thickness normal   Features are consistent with pseudonormal left ventricular filling pattern and concomitant abnormal relaxation increasing filling pressure, grade 2 diastolic dysfunction  Right ventricular size normal with normal systolic function  Trace tricuspid regurgitation        Answers for HPI/ROS submitted by the patient on 1/15/2021   Primary symptoms  Chronicity: recurrent  When did you first notice your symptoms?: in the past 7 days  How often do your symptoms occur?: 2 to 4 times per day  Since you first noticed this problem, how has it changed?: gradually improving  Do you have shortness of breath that occurs with effort or exertion?: Yes  Do you have fatigue?: Yes  Which of the following makes your symptoms worse?: exercise, strenuous activity  Which of the following makes your symptoms better?: OTC cough suppressant, rest  Risk factors for lung disease: animal exposure, travel

## 2021-01-20 ENCOUNTER — TELEPHONE (OUTPATIENT)
Dept: INTERNAL MEDICINE CLINIC | Facility: CLINIC | Age: 76
End: 2021-01-20

## 2021-01-20 DIAGNOSIS — E03.9 HYPOTHYROIDISM, UNSPECIFIED TYPE: Primary | ICD-10-CM

## 2021-01-20 RX ORDER — LEVOTHYROXINE SODIUM 0.07 MG/1
75 TABLET ORAL
Qty: 30 TABLET | Refills: 5 | Status: SHIPPED | OUTPATIENT
Start: 2021-01-20 | End: 2021-03-29 | Stop reason: SDUPTHER

## 2021-01-20 NOTE — TELEPHONE ENCOUNTER
Did receive a call from [facility], the visiting nurse  She states that Brennon Mackenzie rescheduled the apt until today, and then when [facility] went they didn't answer, she also tried to call, and no answer  She states that she will go over later if she can because she's not far away  I did let her know that they always sleep in very late

## 2021-01-21 ENCOUNTER — TELEPHONE (OUTPATIENT)
Dept: INTERNAL MEDICINE CLINIC | Facility: CLINIC | Age: 76
End: 2021-01-21

## 2021-01-21 NOTE — TELEPHONE ENCOUNTER
Received a vm from Jacqueline from home pt  They will be d/cing her and nursing will also be d/cing her

## 2021-01-26 ENCOUNTER — TELEPHONE (OUTPATIENT)
Dept: INTERNAL MEDICINE CLINIC | Facility: CLINIC | Age: 76
End: 2021-01-26

## 2021-01-26 DIAGNOSIS — E03.9 HYPOTHYROIDISM, UNSPECIFIED TYPE: ICD-10-CM

## 2021-01-27 ENCOUNTER — TELEPHONE (OUTPATIENT)
Dept: INTERNAL MEDICINE CLINIC | Facility: CLINIC | Age: 76
End: 2021-01-27

## 2021-02-02 ENCOUNTER — TELEPHONE (OUTPATIENT)
Dept: INTERNAL MEDICINE CLINIC | Facility: CLINIC | Age: 76
End: 2021-02-02

## 2021-02-02 DIAGNOSIS — I26.99 BILATERAL PULMONARY EMBOLISM (HCC): ICD-10-CM

## 2021-02-02 NOTE — TELEPHONE ENCOUNTER
Did check vm from office while at home  vm was from 2/1/21 at 12:19pm      On the vm she stated that she's been taking Eliquis and was told to take advil or aleve instead of tylenol  Then stated that she got visine for her eyes, and woke up with her eye bloodshot  She stated that she didn't take her eliquis because of this  I did let rehan know, and she stated that I needed to call to let her know that we advised she cannot be taking NSAIDs at all with the Eliquis, and that she needs to continue her Eliquis  I did call and received her vm  I lmom stating that she needs to take the eliquis per rehan, and she cannot take the Advil or Aleve, or any other NSAIDs  That she can only take tylenol  Did also state that this was discussed shortly after she was out of the hospital with the nurse, and it is notated in the chart  Did let her know that we weren't in the office yesterday or today

## 2021-02-02 NOTE — TELEPHONE ENCOUNTER
Did get another vm from [facility] stating that she didn't get a call  I did call her back, and she stated that she didn't get it  I did advise her that she is not to be taking any NSAIDs with the eliquis  She states that the nurse told her she can  I did again tell her she can't be taking it  She states that she's been using artificial tears prior and switched to visine allergy drops  Then yesterday she did see it was red  She stopped taking the aleve and the eliquis  I did advise per Luis Fernando Vasquez, that she needs to start the eliquis back up, and not take any NSAIDs including aleve  Let her know she can take tylenol  She states that the eye is red in a different area, and that it is much better than it looked, and the redness is in another area  She denies pain, and can see fine in the eye  [place] doesn't have an eye specialist     She will call the office tomorrow to update how the eye is looking, She did start the eliquis back up as we were on the phone, and she did verbalize what medications she can't take while taking the Eliquis  She did also verbalize that she can take tylenol while on eliquis for teach back

## 2021-02-03 ENCOUNTER — TELEPHONE (OUTPATIENT)
Dept: PULMONOLOGY | Facility: CLINIC | Age: 76
End: 2021-02-03

## 2021-02-03 NOTE — TELEPHONE ENCOUNTER
Did receive a return call from Dana Lundy stating that she spoke with pulmonary, and they told her bid  I called and lmom for them to return a call to me to review because we see the note shows 5mg po qd  Did ask for a call back to clarify everything for Dana Lundy

## 2021-02-03 NOTE — TELEPHONE ENCOUNTER
PCP office called  [phone]  Has a question on how patient should be taking Eliquis  In the notes it states patient should take 5 mg daily  The Rx sent yesterday states twice daily  Please advise

## 2021-02-03 NOTE — TELEPHONE ENCOUNTER
Received a call from [facility] stating that her eye is a light pink color now, so it's improving  She did have questions regarding her eliquis, I did let her know that per pulmonary note it states that she should be taking 1qd  Did provide her their number

## 2021-02-12 DIAGNOSIS — Z23 ENCOUNTER FOR IMMUNIZATION: ICD-10-CM

## 2021-02-15 ENCOUNTER — TELEMEDICINE (OUTPATIENT)
Dept: INTERNAL MEDICINE CLINIC | Facility: CLINIC | Age: 76
End: 2021-02-15
Payer: COMMERCIAL

## 2021-02-15 DIAGNOSIS — U07.1 PNEUMONIA DUE TO COVID-19 VIRUS: ICD-10-CM

## 2021-02-15 DIAGNOSIS — I10 ESSENTIAL HYPERTENSION: ICD-10-CM

## 2021-02-15 DIAGNOSIS — E03.9 HYPOTHYROIDISM, UNSPECIFIED TYPE: ICD-10-CM

## 2021-02-15 DIAGNOSIS — I26.99 BILATERAL PULMONARY EMBOLISM (HCC): Primary | ICD-10-CM

## 2021-02-15 DIAGNOSIS — J12.82 PNEUMONIA DUE TO COVID-19 VIRUS: ICD-10-CM

## 2021-02-15 DIAGNOSIS — E78.00 HYPERCHOLESTEROLEMIA: ICD-10-CM

## 2021-02-15 DIAGNOSIS — E55.9 VITAMIN D DEFICIENCY: ICD-10-CM

## 2021-02-15 PROCEDURE — 99442 PR PHYS/QHP TELEPHONE EVALUATION 11-20 MIN: CPT | Performed by: NURSE PRACTITIONER

## 2021-02-15 NOTE — PROGRESS NOTES
Virtual Brief Visit    Assessment/Plan: Patient is doing well from Pulmonary standpoint  She will continue Eliquis 5 mg BID  Did advised trying Tylenol arthritis for pain relief  Will recheck TSH and vitamin D next week  Will notify once labs are back  Did advised after 90 days getting COVID vaccine  Problem List Items Addressed This Visit        Endocrine    Hypothyroidism    Relevant Orders    TSH, 3rd generation with Free T4 reflex       Respiratory    Pneumonia due to COVID-19 virus       Cardiovascular and Mediastinum    Essential hypertension    Bilateral pulmonary embolism ([address] ) - Primary       Other    Hypercholesterolemia      Other Visit Diagnoses     Vitamin D deficiency        Relevant Orders    Vitamin D 25 hydroxy                Reason for visit is   Chief Complaint   Patient presents with    Virtual Brief Visit        Encounter provider [id number]     Provider located at [address]  [address] [facility]  [id number]    Recent Visits  No visits were found meeting these conditions  Showing recent visits within past 7 days and meeting all other requirements     Today's Visits  Date Type Provider Dept   02/15/21 Telemedicine [facility]   Showing today's visits and meeting all other requirements     Future Appointments  No visits were found meeting these conditions  Showing future appointments within next 150 days and meeting all other requirements        After connecting through telephone, the patient was identified by name and date of birth  Court Embs August was informed that this is a telemedicine visit and that the visit is being conducted through telephone  My office door was closed  No one else was in the room  She acknowledged consent and understanding of privacy and security of the platform   The patient has agreed to participate and understands she can discontinue the visit at any time  Patient is aware this is a billable service  Subjective    Paola Quintana is a 67 y o  female patient  Angelia Duong is for a follow up visit  She was recently hospitalized for covid pneumonia with BL pulmonary emboli  She is taking Eliquis 5 mg BID  She is tolerating this without any issues  She is having joint pain since stopping her NSAIDs  She is taking plain Tylenol but this does not seem to be helping  She states she is also having a lot of hair loss  She is taking her medications as ordered  She denies any other issues  Past Medical History:   Diagnosis Date    Allergic     COVID-19     Hypertension     last assessed 5/4/15     Hypothyroid        Past Surgical History:   Procedure Laterality Date    DILATION AND CURETTAGE OF UTERUS      GALLBLADDER SURGERY         Current Outpatient Medications   Medication Sig Dispense Refill    amLODIPine (NORVASC) 2 5 mg tablet Take 1 tablet (2 5 mg total) by mouth daily 90 tablet 3    apixaban (ELIQUIS) 5 mg Take 1 tablet (5 mg) twice daily 30 tablet 0    levothyroxine 75 mcg tablet Take 1 tablet (75 mcg total) by mouth daily in the early morning 30 tablet 5    losartan (COZAAR) 25 mg tablet Take 1 tablet (25 mg total) by mouth daily 90 tablet 3    omeprazole (PriLOSEC) 20 mg delayed release capsule Take 1 capsule (20 mg total) by mouth daily 90 capsule 3    albuterol (PROVENTIL HFA,VENTOLIN HFA) 90 mcg/act inhaler Inhale 2 puffs every 4 (four) hours as needed for wheezing (Patient not taking: Reported on 1/19/2021) 1 Inhaler 0     No current facility-administered medications for this visit  Allergies   Allergen Reactions    Honey Tussin [Dextromethorphan] Anaphylaxis    Nuts Anaphylaxis     Almonds    Penicillins Itching    Shrimp Extract Allergy Skin Test Anaphylaxis       Review of Systems   Constitutional: Positive for fatigue  Eyes: Negative  Respiratory: Negative  Cardiovascular: Negative  Gastrointestinal: Negative  Endocrine: Negative  Genitourinary: Negative  Musculoskeletal: Positive for arthralgias and myalgias  Skin: Negative  Allergic/Immunologic: Negative  Neurological: Negative  Hematological: Negative  Psychiatric/Behavioral: Negative  There were no vitals filed for this visit  I spent 15 minutes directly with the patient during this visit    VIRTUAL VISIT DISCLAIMER    Ignacio Quintana acknowledges that she has consented to an online visit or consultation  She understands that the online visit is based solely on information provided by her, and that, in the absence of a face-to-face physical evaluation by the physician, the diagnosis she receives is both limited and provisional in terms of accuracy and completeness  This is not intended to replace a full medical face-to-face evaluation by the physician  Ignacio Quintana understands and accepts these terms

## 2021-02-24 DIAGNOSIS — I26.99 BILATERAL PULMONARY EMBOLISM (HCC): ICD-10-CM

## 2021-03-02 ENCOUNTER — LAB (OUTPATIENT)
Dept: LAB | Facility: HOSPITAL | Age: 76
End: 2021-03-02
Payer: COMMERCIAL

## 2021-03-02 DIAGNOSIS — E03.9 HYPOTHYROIDISM, UNSPECIFIED TYPE: ICD-10-CM

## 2021-03-02 DIAGNOSIS — E55.9 VITAMIN D DEFICIENCY: ICD-10-CM

## 2021-03-02 LAB — TSH SERPL DL<=0.05 MIU/L-ACNC: 0.76 UIU/ML (ref 0.36–3.74)

## 2021-03-02 PROCEDURE — 82306 VITAMIN D 25 HYDROXY: CPT

## 2021-03-02 PROCEDURE — 36415 COLL VENOUS BLD VENIPUNCTURE: CPT

## 2021-03-02 PROCEDURE — 84443 ASSAY THYROID STIM HORMONE: CPT

## 2021-03-03 LAB — 25(OH)D3 SERPL-MCNC: 30.6 NG/ML (ref 30–100)

## 2021-03-15 ENCOUNTER — TELEPHONE (OUTPATIENT)
Dept: INTERNAL MEDICINE CLINIC | Facility: CLINIC | Age: 76
End: 2021-03-15

## 2021-03-15 NOTE — TELEPHONE ENCOUNTER
Patient called with complains of ongoing left knee pain  She stated she had x-rays done in the past and wanted more done  Per our discussion you will send her to Ortho  Phone number provided to Jyoti  office

## 2021-03-18 ENCOUNTER — TELEPHONE (OUTPATIENT)
Dept: OBGYN CLINIC | Facility: MEDICAL CENTER | Age: 76
End: 2021-03-18

## 2021-03-22 ENCOUNTER — APPOINTMENT (OUTPATIENT)
Dept: RADIOLOGY | Facility: MEDICAL CENTER | Age: 76
End: 2021-03-22
Payer: COMMERCIAL

## 2021-03-22 ENCOUNTER — OFFICE VISIT (OUTPATIENT)
Dept: OBGYN CLINIC | Facility: CLINIC | Age: 76
End: 2021-03-22
Payer: COMMERCIAL

## 2021-03-22 VITALS
SYSTOLIC BLOOD PRESSURE: 154 MMHG | WEIGHT: 233 LBS | HEIGHT: 64 IN | DIASTOLIC BLOOD PRESSURE: 75 MMHG | BODY MASS INDEX: 39.78 KG/M2 | HEART RATE: 102 BPM

## 2021-03-22 DIAGNOSIS — M17.12 PRIMARY OSTEOARTHRITIS OF LEFT KNEE: ICD-10-CM

## 2021-03-22 DIAGNOSIS — M25.562 CHRONIC PAIN OF LEFT KNEE: ICD-10-CM

## 2021-03-22 DIAGNOSIS — G89.29 CHRONIC PAIN OF LEFT KNEE: ICD-10-CM

## 2021-03-22 DIAGNOSIS — M70.50 ANSERINE BURSITIS: Primary | ICD-10-CM

## 2021-03-22 PROCEDURE — 99213 OFFICE O/P EST LOW 20 MIN: CPT | Performed by: ORTHOPAEDIC SURGERY

## 2021-03-22 PROCEDURE — 1036F TOBACCO NON-USER: CPT | Performed by: ORTHOPAEDIC SURGERY

## 2021-03-22 PROCEDURE — 20610 DRAIN/INJ JOINT/BURSA W/O US: CPT | Performed by: ORTHOPAEDIC SURGERY

## 2021-03-22 PROCEDURE — 73564 X-RAY EXAM KNEE 4 OR MORE: CPT

## 2021-03-22 PROCEDURE — 1160F RVW MEDS BY RX/DR IN RCRD: CPT | Performed by: ORTHOPAEDIC SURGERY

## 2021-03-22 RX ORDER — BETAMETHASONE SODIUM PHOSPHATE AND BETAMETHASONE ACETATE 3; 3 MG/ML; MG/ML
6 INJECTION, SUSPENSION INTRA-ARTICULAR; INTRALESIONAL; INTRAMUSCULAR; SOFT TISSUE
Status: COMPLETED | OUTPATIENT
Start: 2021-03-22 | End: 2021-03-22

## 2021-03-22 RX ORDER — LIDOCAINE HYDROCHLORIDE 10 MG/ML
5 INJECTION, SOLUTION INFILTRATION; PERINEURAL
Status: COMPLETED | OUTPATIENT
Start: 2021-03-22 | End: 2021-03-22

## 2021-03-22 RX ORDER — AMLODIPINE BESYLATE AND BENAZEPRIL HYDROCHLORIDE 2.5; 1 MG/1; MG/1
1 CAPSULE ORAL DAILY
COMMUNITY
End: 2022-02-03

## 2021-03-22 RX ADMIN — LIDOCAINE HYDROCHLORIDE 5 ML: 10 INJECTION, SOLUTION INFILTRATION; PERINEURAL at 17:00

## 2021-03-22 RX ADMIN — BETAMETHASONE SODIUM PHOSPHATE AND BETAMETHASONE ACETATE 6 MG: 3; 3 INJECTION, SUSPENSION INTRA-ARTICULAR; INTRALESIONAL; INTRAMUSCULAR; SOFT TISSUE at 17:00

## 2021-03-29 DIAGNOSIS — E03.9 HYPOTHYROIDISM, UNSPECIFIED TYPE: ICD-10-CM

## 2021-03-29 DIAGNOSIS — I10 ESSENTIAL HYPERTENSION: ICD-10-CM

## 2021-03-29 RX ORDER — AMLODIPINE BESYLATE 2.5 MG/1
2.5 TABLET ORAL DAILY
Qty: 90 TABLET | Refills: 3 | Status: SHIPPED | OUTPATIENT
Start: 2021-03-29 | End: 2021-04-15 | Stop reason: SDUPTHER

## 2021-03-29 RX ORDER — LOSARTAN POTASSIUM 25 MG/1
25 TABLET ORAL DAILY
Qty: 90 TABLET | Refills: 3 | Status: SHIPPED | OUTPATIENT
Start: 2021-03-29 | End: 2021-04-15 | Stop reason: SDUPTHER

## 2021-03-29 RX ORDER — LEVOTHYROXINE SODIUM 0.07 MG/1
75 TABLET ORAL
Qty: 30 TABLET | Refills: 5 | Status: SHIPPED | OUTPATIENT
Start: 2021-03-29 | End: 2021-04-15 | Stop reason: SDUPTHER

## 2021-04-01 DIAGNOSIS — R73.03 PRE-DIABETES: Primary | ICD-10-CM

## 2021-04-01 RX ORDER — DIPHENHYDRAMINE HCL 25 MG
TABLET ORAL 2 TIMES DAILY
Qty: 1 KIT | Refills: 0 | Status: SHIPPED | OUTPATIENT
Start: 2021-04-01 | End: 2021-04-15 | Stop reason: SDUPTHER

## 2021-04-01 RX ORDER — BLOOD-GLUCOSE CONTROL, LOW
EACH MISCELLANEOUS 3 TIMES WEEKLY
Qty: 3 EACH | Refills: 3 | Status: SHIPPED | OUTPATIENT
Start: 2021-04-02 | End: 2021-04-15 | Stop reason: SDUPTHER

## 2021-04-01 RX ORDER — ISOPROPYL ALCOHOL 0.75 G/1
SWAB TOPICAL 2 TIMES DAILY
Qty: 300 EACH | Refills: 3 | Status: SHIPPED | OUTPATIENT
Start: 2021-04-01

## 2021-04-01 RX ORDER — GLUCOSAM/CHON-MSM1/C/MANG/BOSW 500-416.6
TABLET ORAL 2 TIMES DAILY
Qty: 200 EACH | Refills: 3 | Status: SHIPPED | OUTPATIENT
Start: 2021-04-01 | End: 2021-04-15 | Stop reason: SDUPTHER

## 2021-04-01 RX ORDER — CALCIUM CITRATE/VITAMIN D3 200MG-6.25
TABLET ORAL
Qty: 200 EACH | Refills: 3 | Status: SHIPPED | OUTPATIENT
Start: 2021-04-01 | End: 2021-04-15 | Stop reason: SDUPTHER

## 2021-04-02 ENCOUNTER — IMMUNIZATIONS (OUTPATIENT)
Dept: FAMILY MEDICINE CLINIC | Facility: HOSPITAL | Age: 76
End: 2021-04-02

## 2021-04-02 DIAGNOSIS — Z23 ENCOUNTER FOR IMMUNIZATION: Primary | ICD-10-CM

## 2021-04-02 PROCEDURE — 91301 SARS-COV-2 / COVID-19 MRNA VACCINE (MODERNA) 100 MCG: CPT

## 2021-04-02 PROCEDURE — 0011A SARS-COV-2 / COVID-19 MRNA VACCINE (MODERNA) 100 MCG: CPT

## 2021-04-03 DIAGNOSIS — K21.9 GASTROESOPHAGEAL REFLUX DISEASE WITHOUT ESOPHAGITIS: ICD-10-CM

## 2021-04-05 ENCOUNTER — TELEPHONE (OUTPATIENT)
Dept: INTERNAL MEDICINE CLINIC | Facility: CLINIC | Age: 76
End: 2021-04-05

## 2021-04-05 RX ORDER — OMEPRAZOLE 20 MG/1
20 CAPSULE, DELAYED RELEASE ORAL DAILY
Qty: 90 CAPSULE | Refills: 3 | Status: SHIPPED | OUTPATIENT
Start: 2021-04-05 | End: 2022-01-24

## 2021-04-05 NOTE — TELEPHONE ENCOUNTER
Pt called stating that she did have her covid vaccine on Friday  She had some symptoms over the weekend: dizzy, fatigue, chills, and pain at vaccine location  She just called to let us know there was a small red area and a small bump that showed up but other symptoms have gone away  Did let her know that some people did have that happen a few days after shot, and to let us know if it gets larger or hot to the touch  Did let Maya Carey know

## 2021-04-12 ENCOUNTER — HOSPITAL ENCOUNTER (OUTPATIENT)
Dept: NON INVASIVE DIAGNOSTICS | Facility: HOSPITAL | Age: 76
Discharge: HOME | End: 2021-04-12
Payer: COMMERCIAL

## 2021-04-12 ENCOUNTER — HOSPITAL ENCOUNTER (OUTPATIENT)
Dept: CT IMAGING | Facility: HOSPITAL | Age: 76
Discharge: HOME | End: 2021-04-12
Payer: COMMERCIAL

## 2021-04-12 DIAGNOSIS — R59.1 LYMPHADENOPATHY: ICD-10-CM

## 2021-04-12 DIAGNOSIS — U07.1 PNEUMONIA DUE TO COVID-19 VIRUS: ICD-10-CM

## 2021-04-12 DIAGNOSIS — J12.82 PNEUMONIA DUE TO COVID-19 VIRUS: ICD-10-CM

## 2021-04-12 DIAGNOSIS — I26.99 BILATERAL PULMONARY EMBOLISM (HCC): ICD-10-CM

## 2021-04-12 PROCEDURE — 93306 TTE W/DOPPLER COMPLETE: CPT | Performed by: INTERNAL MEDICINE

## 2021-04-12 PROCEDURE — 71250 CT THORAX DX C-: CPT

## 2021-04-12 PROCEDURE — G1004 CDSM NDSC: HCPCS

## 2021-04-12 PROCEDURE — 93306 TTE W/DOPPLER COMPLETE: CPT

## 2021-04-15 DIAGNOSIS — I10 ESSENTIAL HYPERTENSION: ICD-10-CM

## 2021-04-15 DIAGNOSIS — E03.9 HYPOTHYROIDISM, UNSPECIFIED TYPE: ICD-10-CM

## 2021-04-15 DIAGNOSIS — I26.99 BILATERAL PULMONARY EMBOLISM (HCC): ICD-10-CM

## 2021-04-15 DIAGNOSIS — R73.03 PRE-DIABETES: ICD-10-CM

## 2021-04-15 NOTE — TELEPHONE ENCOUNTER
Patient called and states that she wants all of her medications to go to [facility] and not the local pharmacies    Except -could you please send a 30 day supply with zero refills on following medications to [facility], besides the 90 day supply being sent to [facility], because she's out of them:  Levothyroxine  Losartan    Thank you

## 2021-04-17 RX ORDER — DIPHENHYDRAMINE HCL 25 MG
TABLET ORAL 2 TIMES DAILY
Qty: 1 KIT | Refills: 0 | Status: SHIPPED | OUTPATIENT
Start: 2021-04-17

## 2021-04-17 RX ORDER — AMLODIPINE BESYLATE 2.5 MG/1
2.5 TABLET ORAL DAILY
Qty: 90 TABLET | Refills: 3 | Status: SHIPPED | OUTPATIENT
Start: 2021-04-17 | End: 2022-02-03

## 2021-04-17 RX ORDER — LOSARTAN POTASSIUM 25 MG/1
25 TABLET ORAL DAILY
Qty: 90 TABLET | Refills: 3 | Status: SHIPPED | OUTPATIENT
Start: 2021-04-17 | End: 2022-04-04

## 2021-04-17 RX ORDER — GLUCOSAM/CHON-MSM1/C/MANG/BOSW 500-416.6
TABLET ORAL 2 TIMES DAILY
Qty: 200 EACH | Refills: 3 | Status: SHIPPED | OUTPATIENT
Start: 2021-04-17

## 2021-04-17 RX ORDER — LEVOTHYROXINE SODIUM 0.07 MG/1
75 TABLET ORAL
Qty: 90 TABLET | Refills: 3 | Status: SHIPPED | OUTPATIENT
Start: 2021-04-17 | End: 2021-11-08

## 2021-04-17 RX ORDER — CALCIUM CITRATE/VITAMIN D3 200MG-6.25
TABLET ORAL
Qty: 200 EACH | Refills: 3 | Status: SHIPPED | OUTPATIENT
Start: 2021-04-17

## 2021-04-17 RX ORDER — BLOOD-GLUCOSE CONTROL, LOW
EACH MISCELLANEOUS 3 TIMES WEEKLY
Qty: 3 EACH | Refills: 3 | Status: SHIPPED | OUTPATIENT
Start: 2021-04-19

## 2021-04-20 ENCOUNTER — TELEPHONE (OUTPATIENT)
Dept: PULMONOLOGY | Facility: CLINIC | Age: 76
End: 2021-04-20

## 2021-04-20 ENCOUNTER — APPOINTMENT (OUTPATIENT)
Dept: LAB | Facility: HOSPITAL | Age: 76
End: 2021-04-20
Attending: INTERNAL MEDICINE
Payer: COMMERCIAL

## 2021-04-20 ENCOUNTER — OFFICE VISIT (OUTPATIENT)
Dept: PULMONOLOGY | Facility: CLINIC | Age: 76
End: 2021-04-20
Payer: COMMERCIAL

## 2021-04-20 VITALS
DIASTOLIC BLOOD PRESSURE: 64 MMHG | OXYGEN SATURATION: 98 % | WEIGHT: 236.2 LBS | HEIGHT: 64 IN | BODY MASS INDEX: 40.33 KG/M2 | HEART RATE: 84 BPM | TEMPERATURE: 96.9 F | SYSTOLIC BLOOD PRESSURE: 125 MMHG

## 2021-04-20 DIAGNOSIS — I26.99 BILATERAL PULMONARY EMBOLISM (HCC): ICD-10-CM

## 2021-04-20 DIAGNOSIS — I26.99 BILATERAL PULMONARY EMBOLISM (HCC): Primary | ICD-10-CM

## 2021-04-20 LAB — D DIMER PPP FEU-MCNC: 0.34 UG/ML FEU

## 2021-04-20 PROCEDURE — 1036F TOBACCO NON-USER: CPT | Performed by: INTERNAL MEDICINE

## 2021-04-20 PROCEDURE — 85379 FIBRIN DEGRADATION QUANT: CPT

## 2021-04-20 PROCEDURE — 99214 OFFICE O/P EST MOD 30 MIN: CPT | Performed by: INTERNAL MEDICINE

## 2021-04-20 PROCEDURE — 1160F RVW MEDS BY RX/DR IN RCRD: CPT | Performed by: INTERNAL MEDICINE

## 2021-04-20 PROCEDURE — 36415 COLL VENOUS BLD VENIPUNCTURE: CPT

## 2021-04-20 NOTE — TELEPHONE ENCOUNTER
----- Message from Chase Medrano DO sent at 4/20/2021 11:47 AM EDT -----    Can you let Radha Mendoza know that her D-dimer was negative and it is safe to stop her anticoagulation    If she has any increase in her shortness of breath chest pain or palpitations to call immediately

## 2021-04-20 NOTE — PROGRESS NOTES
Assessment/Plan:    Pneumonia due to COVID-19 virus    Paola and I reviewed her recent CT scan without contrast   It did not show any residual infiltrates  Her CT scan is resolve with regards to ground-glass opacities and she has no scarring status post coronavirus  Her oxygen levels are normal on today's exam   We talked about supplements including vitamin-D  She received her 1st coronavirus vaccine  Bilateral pulmonary embolism (HCC)    Alissa maintained on Eliquis twice a day  Unfortunately her CT scan was without contrast home unable to see whether she has residual clotting  I did order a D-dimer today  If it is negative she can discontinue her Eliquis if it is positive will continue it for 3 more months and recheck her D-dimer  I reviewed her 2D echocardiogram which shows no residual right heart strain  Diagnoses and all orders for this visit:    Bilateral pulmonary embolism (HCC)  -     D-dimer, quantitative; Future          Subjective:      Patient ID: Urbano Quintana is a 67 y o  female  Alissa is recovering nicely from her coronavirus infection  She denies any residual shortness of breath wheezing chest pain or heart palpitations  She does feel continued fatigue and has to nap daily in the afternoon  Prior to her infection she had no underlying respiratory issues  The following portions of the patient's history were reviewed and updated as appropriate: allergies, current medications, past family history, past medical history, past social history, past surgical history and problem list     Review of Systems   Constitutional: Negative  Negative for unexpected weight change  HENT: Negative  Negative for postnasal drip  Eyes: Negative  Respiratory: Negative  Negative for cough, shortness of breath and wheezing  Cardiovascular: Negative  Negative for chest pain and leg swelling  Gastrointestinal: Negative  Endocrine: Negative  Genitourinary: Negative  Musculoskeletal: Negative  Allergic/Immunologic: Negative  Neurological: Negative  Hematological: Negative  Objective:      /64   Pulse 84   Temp (!) 96 9 °F (36 1 °C) (Tympanic)   Ht 5' 4" (1 626 m)   Wt 107 kg (236 lb 3 2 oz)   SpO2 98%   BMI 40 54 kg/m²          Physical Exam  Constitutional:       Appearance: She is well-developed  HENT:      Head: Normocephalic  Eyes:      Pupils: Pupils are equal, round, and reactive to light  Neck:      Musculoskeletal: Normal range of motion and neck supple  Cardiovascular:      Rate and Rhythm: Normal rate  Heart sounds: No murmur  Pulmonary:      Effort: Pulmonary effort is normal  No respiratory distress  Breath sounds: Normal breath sounds  No wheezing or rales  Abdominal:      Palpations: Abdomen is soft  Musculoskeletal: Normal range of motion  Skin:     General: Skin is warm and dry  Neurological:      Mental Status: She is alert and oriented to person, place, and time

## 2021-04-20 NOTE — ASSESSMENT & PLAN NOTE
Pritesh Lino and I reviewed her recent CT scan without contrast   It did not show any residual infiltrates  Her CT scan is resolve with regards to ground-glass opacities and she has no scarring status post coronavirus  Her oxygen levels are normal on today's exam   We talked about supplements including vitamin-D  She received her 1st coronavirus vaccine

## 2021-04-20 NOTE — ASSESSMENT & PLAN NOTE
Alissa maintained on Eliquis twice a day  Unfortunately her CT scan was without contrast home unable to see whether she has residual clotting  I did order a D-dimer today  If it is negative she can discontinue her Eliquis if it is positive will continue it for 3 more months and recheck her D-dimer  I reviewed her 2D echocardiogram which shows no residual right heart strain

## 2021-05-04 ENCOUNTER — IMMUNIZATIONS (OUTPATIENT)
Dept: FAMILY MEDICINE CLINIC | Facility: HOSPITAL | Age: 76
End: 2021-05-04

## 2021-05-04 DIAGNOSIS — Z23 ENCOUNTER FOR IMMUNIZATION: Primary | ICD-10-CM

## 2021-05-04 PROCEDURE — 0012A SARS-COV-2 / COVID-19 MRNA VACCINE (MODERNA) 100 MCG: CPT

## 2021-05-04 PROCEDURE — 91301 SARS-COV-2 / COVID-19 MRNA VACCINE (MODERNA) 100 MCG: CPT

## 2021-11-03 ENCOUNTER — TELEPHONE (OUTPATIENT)
Dept: INTERNAL MEDICINE CLINIC | Facility: CLINIC | Age: 76
End: 2021-11-03

## 2021-11-04 DIAGNOSIS — E03.9 HYPOTHYROIDISM, UNSPECIFIED TYPE: ICD-10-CM

## 2021-11-04 DIAGNOSIS — E55.9 VITAMIN D DEFICIENCY: Primary | ICD-10-CM

## 2021-11-05 ENCOUNTER — APPOINTMENT (OUTPATIENT)
Dept: LAB | Facility: HOSPITAL | Age: 76
End: 2021-11-05
Payer: COMMERCIAL

## 2021-11-05 DIAGNOSIS — E55.9 VITAMIN D DEFICIENCY: ICD-10-CM

## 2021-11-05 DIAGNOSIS — E03.9 HYPOTHYROIDISM, UNSPECIFIED TYPE: ICD-10-CM

## 2021-11-05 LAB
25(OH)D3 SERPL-MCNC: 40.8 NG/ML (ref 30–100)
ALBUMIN SERPL BCP-MCNC: 3.3 G/DL (ref 3.5–5)
ALP SERPL-CCNC: 142 U/L (ref 46–116)
ALT SERPL W P-5'-P-CCNC: 28 U/L (ref 12–78)
ANION GAP SERPL CALCULATED.3IONS-SCNC: 10 MMOL/L (ref 4–13)
AST SERPL W P-5'-P-CCNC: 25 U/L (ref 5–45)
BASOPHILS # BLD AUTO: 0.07 THOUSANDS/ΜL (ref 0–0.1)
BASOPHILS NFR BLD AUTO: 1 % (ref 0–1)
BILIRUB SERPL-MCNC: 0.23 MG/DL (ref 0.2–1)
BUN SERPL-MCNC: 18 MG/DL (ref 5–25)
CALCIUM ALBUM COR SERPL-MCNC: 9 MG/DL (ref 8.3–10.1)
CALCIUM SERPL-MCNC: 8.4 MG/DL (ref 8.3–10.1)
CHLORIDE SERPL-SCNC: 103 MMOL/L (ref 100–108)
CO2 SERPL-SCNC: 26 MMOL/L (ref 21–32)
CREAT SERPL-MCNC: 1.13 MG/DL (ref 0.6–1.3)
EOSINOPHIL # BLD AUTO: 0.27 THOUSAND/ΜL (ref 0–0.61)
EOSINOPHIL NFR BLD AUTO: 4 % (ref 0–6)
ERYTHROCYTE [DISTWIDTH] IN BLOOD BY AUTOMATED COUNT: 14.3 % (ref 11.6–15.1)
GFR SERPL CREATININE-BSD FRML MDRD: 47 ML/MIN/1.73SQ M
GLUCOSE P FAST SERPL-MCNC: 116 MG/DL (ref 65–99)
HCT VFR BLD AUTO: 36.2 % (ref 34.8–46.1)
HGB BLD-MCNC: 11.3 G/DL (ref 11.5–15.4)
IMM GRANULOCYTES # BLD AUTO: 0.01 THOUSAND/UL (ref 0–0.2)
IMM GRANULOCYTES NFR BLD AUTO: 0 % (ref 0–2)
LYMPHOCYTES # BLD AUTO: 2.54 THOUSANDS/ΜL (ref 0.6–4.47)
LYMPHOCYTES NFR BLD AUTO: 39 % (ref 14–44)
MCH RBC QN AUTO: 27.1 PG (ref 26.8–34.3)
MCHC RBC AUTO-ENTMCNC: 31.2 G/DL (ref 31.4–37.4)
MCV RBC AUTO: 87 FL (ref 82–98)
MONOCYTES # BLD AUTO: 0.44 THOUSAND/ΜL (ref 0.17–1.22)
MONOCYTES NFR BLD AUTO: 7 % (ref 4–12)
NEUTROPHILS # BLD AUTO: 3.26 THOUSANDS/ΜL (ref 1.85–7.62)
NEUTS SEG NFR BLD AUTO: 49 % (ref 43–75)
NRBC BLD AUTO-RTO: 0 /100 WBCS
PLATELET # BLD AUTO: 296 THOUSANDS/UL (ref 149–390)
PMV BLD AUTO: 10.3 FL (ref 8.9–12.7)
POTASSIUM SERPL-SCNC: 3.5 MMOL/L (ref 3.5–5.3)
PROT SERPL-MCNC: 6.8 G/DL (ref 6.4–8.2)
RBC # BLD AUTO: 4.17 MILLION/UL (ref 3.81–5.12)
SODIUM SERPL-SCNC: 139 MMOL/L (ref 136–145)
T4 FREE SERPL-MCNC: 0.86 NG/DL (ref 0.76–1.46)
TSH SERPL DL<=0.05 MIU/L-ACNC: 8.78 UIU/ML (ref 0.36–3.74)
WBC # BLD AUTO: 6.59 THOUSAND/UL (ref 4.31–10.16)

## 2021-11-05 PROCEDURE — 84443 ASSAY THYROID STIM HORMONE: CPT

## 2021-11-05 PROCEDURE — 36415 COLL VENOUS BLD VENIPUNCTURE: CPT

## 2021-11-05 PROCEDURE — 82306 VITAMIN D 25 HYDROXY: CPT

## 2021-11-05 PROCEDURE — 85025 COMPLETE CBC W/AUTO DIFF WBC: CPT

## 2021-11-05 PROCEDURE — 84439 ASSAY OF FREE THYROXINE: CPT

## 2021-11-05 PROCEDURE — 80053 COMPREHEN METABOLIC PANEL: CPT

## 2021-11-08 ENCOUNTER — TELEPHONE (OUTPATIENT)
Dept: INTERNAL MEDICINE CLINIC | Facility: CLINIC | Age: 76
End: 2021-11-08

## 2021-11-08 DIAGNOSIS — D64.9 LOW HEMOGLOBIN: Primary | ICD-10-CM

## 2021-11-08 DIAGNOSIS — E03.9 HYPOTHYROIDISM, UNSPECIFIED TYPE: Primary | ICD-10-CM

## 2021-11-08 RX ORDER — LEVOTHYROXINE SODIUM 0.1 MG/1
100 TABLET ORAL DAILY
Qty: 30 TABLET | Refills: 3 | Status: SHIPPED | OUTPATIENT
Start: 2021-11-08 | End: 2022-02-04

## 2021-11-09 ENCOUNTER — IMMUNIZATIONS (OUTPATIENT)
Dept: FAMILY MEDICINE CLINIC | Facility: HOSPITAL | Age: 76
End: 2021-11-09

## 2021-11-09 ENCOUNTER — APPOINTMENT (OUTPATIENT)
Dept: LAB | Facility: HOSPITAL | Age: 76
End: 2021-11-09
Payer: COMMERCIAL

## 2021-11-09 DIAGNOSIS — D64.9 LOW HEMOGLOBIN: ICD-10-CM

## 2021-11-09 DIAGNOSIS — Z23 ENCOUNTER FOR IMMUNIZATION: Primary | ICD-10-CM

## 2021-11-09 LAB
FERRITIN SERPL-MCNC: 11 NG/ML (ref 8–388)
IRON SATN MFR SERPL: 11 % (ref 15–50)
IRON SERPL-MCNC: 47 UG/DL (ref 50–170)
TIBC SERPL-MCNC: 433 UG/DL (ref 250–450)

## 2021-11-09 PROCEDURE — 91306 COVID-19 MODERNA VACC 0.25 ML BOOSTER: CPT

## 2021-11-09 PROCEDURE — 82728 ASSAY OF FERRITIN: CPT

## 2021-11-09 PROCEDURE — 36415 COLL VENOUS BLD VENIPUNCTURE: CPT

## 2021-11-09 PROCEDURE — 0013A COVID-19 MODERNA VACC 0.25 ML BOOSTER: CPT

## 2021-11-09 PROCEDURE — 83550 IRON BINDING TEST: CPT

## 2021-11-09 PROCEDURE — 83540 ASSAY OF IRON: CPT

## 2021-11-11 DIAGNOSIS — D50.9 IRON DEFICIENCY ANEMIA, UNSPECIFIED IRON DEFICIENCY ANEMIA TYPE: Primary | ICD-10-CM

## 2021-11-11 RX ORDER — FERROUS SULFATE TAB EC 324 MG (65 MG FE EQUIVALENT) 324 (65 FE) MG
324 TABLET DELAYED RESPONSE ORAL
Qty: 60 TABLET | Refills: 3 | Status: SHIPPED | OUTPATIENT
Start: 2021-11-11 | End: 2022-02-07

## 2021-11-22 NOTE — ASSESSMENT & PLAN NOTE
Your Child's Health  [age]-Year-Old Visit      Snow L Swaab  November 22, 2021    Visit Vitals  BP 86/60   Pulse 88   Resp 28   Ht 3' 0.5\" (0.927 m)   Wt 15 kg (33 lb)   HC 50.5 cm (19.88\")   BMI 17.42 kg/m²     Weight: 33 lbs      YOUR CHILD'S 2 YEAR-OLD VISIT    Nutrition:  Children at this age typically eat three small meals a day and 2 or 3 snacks. A decreased appetite is common at this age, so when your child refuses food, don't insist that they eat it, but they should sit with the family during meal times. (Do not prepare an alternative meal for them!) Instead, keep healthy choices easily available for when they want to snack. Keep veggies and fruits washed and cut so that they can access them easily. Applesauce, cheese, whole grain crackers and yogurt (with low sugar content) are also healthy choices. Your child's swallowing coordination is still developing, and hard, chunky, or sticky foods are still choking hazards. So be sure to cut foods like hotdogs, grapes, raw carrots, etc. into small pieces before offering them to your child. Do not give your child junk food, bagged snacks, candy, sweet treats (candy, juice, soda) or fast food on a regular basis--obesity is a serious medical problem in our country, and children who start eating  a lot of unhealthy food choices at an early age are more likely to become overweight.      Your child should drink lots of water. A city or municipal water supply is safe and contains fluoride which helps protect teeth from cavities. Your child should have between 16 and 24 ounces of low-fat or skim milk daily to ensure they are getting enough calcium. A multivitamin with iron is recommended to make sure they are getting enough vitamin D (600 IU per day). If you give your child juice, limit it to no more than 4 ounces a day of 100% juice. If children are going to carry around a water bottle or cup with them over the course of the day, make sure it contains only water.  · Give potassium chloride 40 meQ PO x 1 dose  · Follow the potassium level and magnesium level Juice or even watered-down juice contains a fair amount of sugar, and frequent exposure will increase the risk of tooth decay (cavities). Avoid other sweet drinks (fruity drinks, soda, sweet tea) which also contain lots of sugar and have no nutritional value!    Dental:  Your child should be brushing at least twice daily with a pea-sized amount of regular (fluoridated) toothpaste. Brushing at bedtime is particularly important. Make sure to help your child brush so you are sure that all their back teeth are cleaned well.     Behavior and Development:  At this age most children can go to the bathroom by themselves (at least to urinate), get themselves (mostly) dressed, pedal a tricycle, and climb and jump well. They often demonstrate creative and imaginative play. They should be using short sentences regularly, enjoy telling you stories about their day and things they have seen, and their speech should be mostly understandable to strangers. They should have a good grasp of words that compare things (like bigger or shorter, higher or lower) and prepositions (like on, after, by).      Positive, healthy family interactions will help your child's social and emotional development. When your child is behaving well, notice it and tell them that you like what they are doing. Whenever possible, allow your child to make simple choices (what shirt to wear, which snack to have, which book to read it). Have realistic expectations about what your child is capable of doing and know that they will get angry and frustrated sometimes. Help them name their emotions (\"you are feeling...[angry, sad, love]”).     When your child is angry, take away the source of whatever is upsetting them if possible, speak to them respectfully (don't yell) and distract them to another more positive activity or simply give them some alone time to cool down. Do not tolerate violent behavior - intervene quickly if your child is hitting, biting or being  physically aggressive. Say \"no\" clearly and firmly, use brief time outs and direct them to other activities. Be sure that you always respond the same way to their behaviors, and make sure other caretakers are consistent in how they respond to behaviors (especially unwanted behaviors!).    Help your child's language skills continue to grow by reading, singing and playing rhyming games every day. You do not always have to read the books that you are looking at together-- talk about the pictures and let your child make up their own story.  Encourage them to use their language by describing things. For instance, in the grocery store, have them describe the size, shape and color of fruits and vegetables.     Physical activity is important at every age. Playing interactive games helps children learn to take turns (which is good preparation for school). Television (or any sort of screen time) should be limited to no more than 1 hour a day of educational, kid-friendly programming. Always watch with your child and talk to them about what they are seeing and learning. Remember that commercials are geared to make your children want what they're selling, even if it is something unhealthy or unsafe! For suggestions on developing healthy media habits, do an internet search for “healthychildren media use plan” for recommendations from the American Academy of Pediatrics. Doing this early is important because habits are hard to change once they start, and too much screen time can replace physical play and hands-on activity, impact social development, and cause sleep problems. Do not put a TV in your child's bedroom.      Safety:  Your child should still always be in the backseat in an infant car seat with a 5-point harness (straps over both shoulders) until they reach a minimum of 40 pounds. They are safest rear-facing as long as they are within the rear-facing size limits for the seat.    Your 2 year old child is very curious-- and  pretty coordinated! Never allow them to play outside unsupervised, especially around driveways and streets. They should wear helmets when riding bikes and tricycles.To protect them from falls, keep furniture away from upstairs windows and install window guards. They need close supervision around any water source - bathtubs, inground pools, baby pools.  An adult needs to supervise them around water - do not rely on older children to supervise younger ones.  If on a boat, they need to be in lifejackets approved by the  [employer]. Water classes, swimming lessons, \"floaties\" (or \"swimmies\") do not prevent  drowning. Remember to use sunscreen with an SPF of 15 or higher when outside and re-apply after time in the water.    Continue to make sure you keep all medications, cleaning solutions, insecticides, matches, lighters and any other toxic substance well out of reach of children. Make sure that the Poison Control number (1-801.620.9509) is in your cell phone.    Pets are an important part of many family's lives at this age. However, it is important to teach children to be very respectful and careful around pets. They should always ask for permission before approaching or petting any pet, and they should know never to approach an animal that is eating or sleeping.    If you store a gun or firearm in your home, make sure it is stored safely - unloaded and securely locked, with the ammunition stored separately. Make sure you know whether there are any firearms in any other homes where your child is spending time and insist on proper storage of any firearms in those places.    Lead poisoning has been a problem in [place]; the main sources are paint (lead-based) and dust and soil in and around homes where lead-based paint has been used.  Rarely, imported jewelry, canned goods, toys, antiques and dishes can be a source of lead; certain herbal or “folk” remedies and jobs or hobbies utilizing are also exposure risks. Your  child should be tested for lead at age 3 years if they are living in (or frequently spend time in) housing built before 1978 with recent or ongoing renovation or they have a sibling or playmate who has an elevated blood lead level. If you have questions about older neighborhoods in [place] that might have lead connecting (or service) pipes, do an internet search for \"[place] lead awareness and drinking water safety\".    MEDICATION FOR FEVER OR PAIN:   Acetaminophen liquid (e.g., Tylenol or Tempra) may be given every four hours as needed for pain or fever.  Acetaminophen liquid is less concentrated than the infant dropper bottle type.  Be sure to check which product CONCENTRATION you are using.    CHILDREN’S Tylenol/Acetaminophen  (160 MG/5 mL)    Child’s Weight: Dose:  24 - 35 pounds:   160 mg (5.0 mL (1 Teaspoon))  36 - 47 pounds:   240 mg (7.5 mL (1 1/2 Teaspoons))    CHILDREN’S Tylenol/Acetaminophen MELTAWAYS ( 80 MG tablets)    Child’s Weight:  Dose:  24 - 35 pounds:    160 mg (2 meltaway tablets)  36 - 47 pounds:    240 mg (3 meltaway tablets)    CHILDREN'S Ibuprofen liquid (100MG/5mL) (e.g., Advil or Motrin) may be given every six hours as needed for pain or fever. Please check the concentration of your ibuprofen to be sure you are giving the correct amount.      Child’s Weight:  Dose:  24 - 35 pounds:    100 mg (1 Teaspoon)  36 - 47 pounds:    150 mg (1 1/2 Teaspoons)    If Snow is outside these weight ranges, call your pediatrician's office for advice.    Most Recent Immunizations   Administered Date(s) Administered   • DTaP(INFANRIX) 03/30/2020   • DTaP/HIB/IPV 07/09/2019   • Hep A, ped/adol, 2 dose 06/17/2020   • Hep B, Unspecified Formulation 2018   • Hep B, adolescent or pediatric 03/30/2020   • Hib (PRP-OMP) 03/30/2020   • Influenza, injectable, quadrivalent, preservative-free 11/23/2020   • MMR 12/10/2019   • Pneumococcal Conjugate 13 valent 12/10/2019   • Rotavirus - monovalent 03/25/2019    • Varicella 02/19/2020   Pended Date(s) Pended   • Influenza, injectable, quadrivalent, preservative-free 11/22/2021       If Snow develops any of the following reactions within 72 hours after an immunization, notify your pediatrician by calling the pediatric phone nurse:  1.  A temperature of 105 degrees or above.  2.  More than 3 hours of continuous crying.  3.  A shrill, high-pitched cry.  4.  A pale, limp spell.  5.  A seizure or fainting spell.  In this case, you should call 911 or go immediately to the emergency room.      NEXT VISIT:  3 YEARS OF AGE    Thank you for entrusting your care to [facility].    Also, check out “Children’s Health” on the [facility] Blog for updates on timely topics regarding children’s health!

## 2022-01-22 DIAGNOSIS — K21.9 GASTROESOPHAGEAL REFLUX DISEASE WITHOUT ESOPHAGITIS: ICD-10-CM

## 2022-01-24 RX ORDER — OMEPRAZOLE 20 MG/1
CAPSULE, DELAYED RELEASE ORAL
Qty: 90 CAPSULE | Refills: 3 | Status: SHIPPED | OUTPATIENT
Start: 2022-01-24

## 2022-02-02 DIAGNOSIS — I10 ESSENTIAL HYPERTENSION: ICD-10-CM

## 2022-02-03 RX ORDER — AMLODIPINE BESYLATE 2.5 MG/1
TABLET ORAL
Qty: 90 TABLET | Refills: 3 | Status: SHIPPED | OUTPATIENT
Start: 2022-02-03

## 2022-02-04 DIAGNOSIS — E03.9 HYPOTHYROIDISM, UNSPECIFIED TYPE: ICD-10-CM

## 2022-02-04 RX ORDER — LEVOTHYROXINE SODIUM 0.1 MG/1
TABLET ORAL
Qty: 90 TABLET | Refills: 1 | Status: SHIPPED | OUTPATIENT
Start: 2022-02-04 | End: 2022-06-06

## 2022-02-07 DIAGNOSIS — D50.9 IRON DEFICIENCY ANEMIA, UNSPECIFIED IRON DEFICIENCY ANEMIA TYPE: ICD-10-CM

## 2022-02-07 RX ORDER — FERROUS SULFATE TAB EC 324 MG (65 MG FE EQUIVALENT) 324 (65 FE) MG
TABLET DELAYED RESPONSE ORAL
Qty: 180 TABLET | Refills: 1 | Status: SHIPPED | OUTPATIENT
Start: 2022-02-07

## 2022-05-26 DIAGNOSIS — I10 ESSENTIAL HYPERTENSION: ICD-10-CM

## 2022-05-26 RX ORDER — LOSARTAN POTASSIUM 25 MG/1
TABLET ORAL
Qty: 90 TABLET | Refills: 1 | Status: SHIPPED | OUTPATIENT
Start: 2022-05-26 | End: 2022-06-27

## 2022-06-01 ENCOUNTER — APPOINTMENT (OUTPATIENT)
Dept: LAB | Facility: HOSPITAL | Age: 77
End: 2022-06-01
Payer: COMMERCIAL

## 2022-06-01 ENCOUNTER — OFFICE VISIT (OUTPATIENT)
Dept: INTERNAL MEDICINE CLINIC | Facility: CLINIC | Age: 77
End: 2022-06-01
Payer: COMMERCIAL

## 2022-06-01 ENCOUNTER — RA CDI HCC (OUTPATIENT)
Dept: OTHER | Facility: HOSPITAL | Age: 77
End: 2022-06-01

## 2022-06-01 VITALS — TEMPERATURE: 97.8 F | HEART RATE: 102 BPM | OXYGEN SATURATION: 99 %

## 2022-06-01 DIAGNOSIS — E03.9 ACQUIRED HYPOTHYROIDISM: ICD-10-CM

## 2022-06-01 DIAGNOSIS — D50.9 IRON DEFICIENCY ANEMIA, UNSPECIFIED IRON DEFICIENCY ANEMIA TYPE: ICD-10-CM

## 2022-06-01 DIAGNOSIS — I10 ESSENTIAL HYPERTENSION: ICD-10-CM

## 2022-06-01 DIAGNOSIS — R73.01 IMPAIRED FASTING BLOOD SUGAR: ICD-10-CM

## 2022-06-01 DIAGNOSIS — B02.9 HERPES ZOSTER WITHOUT COMPLICATION: ICD-10-CM

## 2022-06-01 DIAGNOSIS — E78.2 MIXED HYPERLIPIDEMIA: ICD-10-CM

## 2022-06-01 DIAGNOSIS — E03.9 HYPOTHYROIDISM, UNSPECIFIED TYPE: ICD-10-CM

## 2022-06-01 DIAGNOSIS — B02.9 HERPES ZOSTER WITHOUT COMPLICATION: Primary | ICD-10-CM

## 2022-06-01 LAB
ALBUMIN SERPL BCP-MCNC: 3.4 G/DL (ref 3.5–5)
ALP SERPL-CCNC: 128 U/L (ref 46–116)
ALT SERPL W P-5'-P-CCNC: 28 U/L (ref 12–78)
ANION GAP SERPL CALCULATED.3IONS-SCNC: 11 MMOL/L (ref 4–13)
AST SERPL W P-5'-P-CCNC: 25 U/L (ref 5–45)
BASOPHILS # BLD AUTO: 0.06 THOUSANDS/ΜL (ref 0–0.1)
BASOPHILS NFR BLD AUTO: 1 % (ref 0–1)
BILIRUB SERPL-MCNC: 0.51 MG/DL (ref 0.2–1)
BUN SERPL-MCNC: 16 MG/DL (ref 5–25)
CALCIUM ALBUM COR SERPL-MCNC: 9.2 MG/DL (ref 8.3–10.1)
CALCIUM SERPL-MCNC: 8.7 MG/DL (ref 8.3–10.1)
CHLORIDE SERPL-SCNC: 103 MMOL/L (ref 100–108)
CHOLEST SERPL-MCNC: 232 MG/DL
CO2 SERPL-SCNC: 25 MMOL/L (ref 21–32)
CREAT SERPL-MCNC: 1.1 MG/DL (ref 0.6–1.3)
EOSINOPHIL # BLD AUTO: 0.2 THOUSAND/ΜL (ref 0–0.61)
EOSINOPHIL NFR BLD AUTO: 3 % (ref 0–6)
ERYTHROCYTE [DISTWIDTH] IN BLOOD BY AUTOMATED COUNT: 12.7 % (ref 11.6–15.1)
EST. AVERAGE GLUCOSE BLD GHB EST-MCNC: 120 MG/DL
FERRITIN SERPL-MCNC: 43 NG/ML (ref 8–388)
GFR SERPL CREATININE-BSD FRML MDRD: 48 ML/MIN/1.73SQ M
GLUCOSE P FAST SERPL-MCNC: 121 MG/DL (ref 65–99)
HBA1C MFR BLD: 5.8 %
HCT VFR BLD AUTO: 40.6 % (ref 34.8–46.1)
HDLC SERPL-MCNC: 65 MG/DL
HGB BLD-MCNC: 13.6 G/DL (ref 11.5–15.4)
IMM GRANULOCYTES # BLD AUTO: 0.01 THOUSAND/UL (ref 0–0.2)
IMM GRANULOCYTES NFR BLD AUTO: 0 % (ref 0–2)
IRON SATN MFR SERPL: 33 % (ref 15–50)
IRON SERPL-MCNC: 109 UG/DL (ref 50–170)
LDLC SERPL CALC-MCNC: 147 MG/DL (ref 0–100)
LYMPHOCYTES # BLD AUTO: 2.69 THOUSANDS/ΜL (ref 0.6–4.47)
LYMPHOCYTES NFR BLD AUTO: 38 % (ref 14–44)
MCH RBC QN AUTO: 29.7 PG (ref 26.8–34.3)
MCHC RBC AUTO-ENTMCNC: 33.5 G/DL (ref 31.4–37.4)
MCV RBC AUTO: 89 FL (ref 82–98)
MONOCYTES # BLD AUTO: 0.47 THOUSAND/ΜL (ref 0.17–1.22)
MONOCYTES NFR BLD AUTO: 7 % (ref 4–12)
NEUTROPHILS # BLD AUTO: 3.61 THOUSANDS/ΜL (ref 1.85–7.62)
NEUTS SEG NFR BLD AUTO: 51 % (ref 43–75)
NONHDLC SERPL-MCNC: 167 MG/DL
NRBC BLD AUTO-RTO: 0 /100 WBCS
PLATELET # BLD AUTO: 289 THOUSANDS/UL (ref 149–390)
PMV BLD AUTO: 10 FL (ref 8.9–12.7)
POTASSIUM SERPL-SCNC: 3.6 MMOL/L (ref 3.5–5.3)
PROT SERPL-MCNC: 7.1 G/DL (ref 6.4–8.2)
RBC # BLD AUTO: 4.58 MILLION/UL (ref 3.81–5.12)
SODIUM SERPL-SCNC: 139 MMOL/L (ref 136–145)
T4 FREE SERPL-MCNC: 1.07 NG/DL (ref 0.76–1.46)
TIBC SERPL-MCNC: 332 UG/DL (ref 250–450)
TRIGL SERPL-MCNC: 100 MG/DL
TSH SERPL DL<=0.05 MIU/L-ACNC: 0.23 UIU/ML (ref 0.45–4.5)
WBC # BLD AUTO: 7.04 THOUSAND/UL (ref 4.31–10.16)

## 2022-06-01 PROCEDURE — 80061 LIPID PANEL: CPT

## 2022-06-01 PROCEDURE — 84443 ASSAY THYROID STIM HORMONE: CPT

## 2022-06-01 PROCEDURE — 85025 COMPLETE CBC W/AUTO DIFF WBC: CPT

## 2022-06-01 PROCEDURE — 84439 ASSAY OF FREE THYROXINE: CPT

## 2022-06-01 PROCEDURE — 83036 HEMOGLOBIN GLYCOSYLATED A1C: CPT

## 2022-06-01 PROCEDURE — 80053 COMPREHEN METABOLIC PANEL: CPT

## 2022-06-01 PROCEDURE — 82728 ASSAY OF FERRITIN: CPT

## 2022-06-01 PROCEDURE — 83540 ASSAY OF IRON: CPT

## 2022-06-01 PROCEDURE — 83550 IRON BINDING TEST: CPT

## 2022-06-01 PROCEDURE — 36415 COLL VENOUS BLD VENIPUNCTURE: CPT

## 2022-06-01 PROCEDURE — 99214 OFFICE O/P EST MOD 30 MIN: CPT | Performed by: NURSE PRACTITIONER

## 2022-06-01 RX ORDER — VALACYCLOVIR HYDROCHLORIDE 1 G/1
1000 TABLET, FILM COATED ORAL 3 TIMES DAILY
Qty: 21 TABLET | Refills: 0 | Status: SHIPPED | OUTPATIENT
Start: 2022-06-01 | End: 2022-06-14 | Stop reason: ALTCHOICE

## 2022-06-01 NOTE — PROGRESS NOTES
Assessment/Plan: Will start on Valtrex 1000 mg every 8 hours for 7 days  Will put fasting labs in for patient  Will follow up later this week to see how she is feeling  No problem-specific Assessment & Plan notes found for this encounter  Problem List Items Addressed This Visit        Endocrine    Hypothyroidism    Relevant Orders    Comprehensive metabolic panel    CBC and differential    TSH, 3rd generation with Free T4 reflex    Comprehensive metabolic panel    CBC and differential    TSH, 3rd generation with Free T4 reflex    Acquired hypothyroidism    Relevant Orders    Comprehensive metabolic panel    CBC and differential    TSH, 3rd generation with Free T4 reflex       Cardiovascular and Mediastinum    Essential hypertension    Relevant Orders    Comprehensive metabolic panel    CBC and differential    TSH, 3rd generation with Free T4 reflex       Other    Hyperlipidemia    Relevant Orders    Comprehensive metabolic panel    CBC and differential    TSH, 3rd generation with Free T4 reflex    Lipid panel    Herpes zoster without complication - Primary    Relevant Medications    valACYclovir (VALTREX) 1,000 mg tablet    Other Relevant Orders    Comprehensive metabolic panel    CBC and differential    TSH, 3rd generation with Free T4 reflex      Other Visit Diagnoses     Iron deficiency anemia, unspecified iron deficiency anemia type        Relevant Orders    Comprehensive metabolic panel    CBC and differential    TSH, 3rd generation with Free T4 reflex    Iron Panel (Includes Ferritin, Iron Sat%, Iron, and TIBC)    Impaired fasting blood sugar        Relevant Orders    HEMOGLOBIN A1C W/ EAG ESTIMATION            Subjective:      Patient ID: Pepe Naranjo August is a 67 y o  female  Pepe Naranjo is for an acute visit  She started with a rash to her left upper thigh yesterday and states there was vesicles noted  She woke up this am with a mild rash to her left upper arm  She states it is itchy and painful   She denies any fever  She does not think she got bit by anything  She feels it is shingles like in the past  She offers no other issues  The following portions of the patient's history were reviewed and updated as appropriate: She  has a past medical history of Allergic, COVID-19, Hypertension, and Hypothyroid  She   Patient Active Problem List    Diagnosis Date Noted    Herpes zoster without complication 01/68/2178    Vitamin D deficiency 08/52/7923    Diastolic dysfunction 13/69/0187    Hyperchloremia 01/02/2021    Hyperglycemia 01/02/2021    Hyponatremia 01/01/2021    Low TSH level 01/01/2021    [facility] embolism ([facility] ) 12/31/2020    Pneumonia due to COVID-19 virus 12/31/2020    Hypercholesterolemia 12/31/2020    Lymphadenopathy 12/31/2020    Acquired hypothyroidism 12/31/2020    Hypokalemia 12/31/2020    Transaminitis 12/31/2020    Morbid obesity with BMI of 40 0-44 9, adult ([facility] ) 10/07/2020    Tinnitus of both ears 10/07/2020    BMI 38 0-38 9,adult 10/01/2019    Arthritis of left knee 10/01/2019    Arthralgia 10/01/2019    Nontraumatic complete tear of left rotator cuff 10/01/2019    Postmenopausal 10/01/2019    Essential hypertension 07/31/2018    Obstructive sleep apnea 05/04/2015    SVT (supraventricular tachycardia) (HCC) 04/02/2015    Elevated alkaline phosphatase level 02/26/2015    Impaired fasting glucose 02/26/2015    Palpitations 02/26/2015    Hearing loss 03/24/2014    Hyperlipidemia 03/24/2014    Esophageal reflux 03/21/2014    Hypothyroidism 03/21/2014    Morbid obesity ([facility] ) 03/21/2014     She  has a past surgical history that includes Dilation and curettage of uterus and Gallbladder surgery  Her family history includes Emphysema in her father and mother  She  reports that she has never smoked  She has never used smokeless tobacco  She reports previous alcohol use  She reports that she does not use drugs    Current Outpatient Medications   Medication Sig Dispense Refill    valACYclovir (VALTREX) 1,000 mg tablet Take 1 tablet (1,000 mg total) by mouth 3 (three) times a day for 7 days 21 tablet 0    albuterol (PROVENTIL HFA,VENTOLIN HFA) 90 mcg/act inhaler Inhale 2 puffs every 4 (four) hours as needed for wheezing (Patient not taking: Reported on 3/22/2021) 1 Inhaler 0    Alcohol Swabs (B-D SINGLE USE SWABS REGULAR) PADS Use 2 (two) times a day DX: R73 03 300 each 3    amLODIPine (NORVASC) 2 5 mg tablet TAKE 1 TABLET DAILY 90 tablet 3    apixaban (ELIQUIS) 5 mg Take 1 tablet (5 mg) twice daily 180 tablet 3    Blood Glucose Calibration (True Metrix Level 2) Normal SOLN Test 3 (three) times a week Dx: R73 03 3 each 3    Blood Glucose Monitoring Suppl (True Metrix Air Glucose Meter) w/Device KIT Use 2 (two) times a day TEST 2X DAILY   DX: R73 03 1 kit 0    ferrous sulfate 324 (65 Fe) mg TAKE 1 TABLET BY MOUTH 2 TIMES A DAY BEFORE MEALS 180 tablet 1    glucose blood (True Metrix Blood Glucose Test) test strip TEST 2X DAILY    DX: R73 03 200 each 3    levothyroxine 100 mcg tablet TAKE 1 TABLET BY MOUTH EVERY DAY 90 tablet 1    losartan (COZAAR) 25 mg tablet TAKE 1 TABLET BY MOUTH EVERY DAY 90 tablet 1    omeprazole (PriLOSEC) 20 mg delayed release capsule TAKE 1 CAPSULE EVERY DAY 90 capsule 3    TRUEplus Lancets 30G MISC Use 2 (two) times a day DX: R73 03 200 each 3     No current facility-administered medications for this visit       Current Outpatient Medications on File Prior to Visit   Medication Sig    albuterol (PROVENTIL HFA,VENTOLIN HFA) 90 mcg/act inhaler Inhale 2 puffs every 4 (four) hours as needed for wheezing (Patient not taking: Reported on 3/22/2021)    Alcohol Swabs (B-D SINGLE USE SWABS REGULAR) PADS Use 2 (two) times a day DX: R73 03    amLODIPine (NORVASC) 2 5 mg tablet TAKE 1 TABLET DAILY    apixaban (ELIQUIS) 5 mg Take 1 tablet (5 mg) twice daily    Blood Glucose Calibration (True Metrix Level 2) Normal SOLN Test 3 (three) times a week Dx: R73 03    Blood Glucose Monitoring Suppl (True Metrix Air Glucose Meter) w/Device KIT Use 2 (two) times a day TEST 2X DAILY   DX: R73 03    ferrous sulfate 324 (65 Fe) mg TAKE 1 TABLET BY MOUTH 2 TIMES A DAY BEFORE MEALS    glucose blood (True Metrix Blood Glucose Test) test strip TEST 2X DAILY    DX: R73 03    levothyroxine 100 mcg tablet TAKE 1 TABLET BY MOUTH EVERY DAY    losartan (COZAAR) 25 mg tablet TAKE 1 TABLET BY MOUTH EVERY DAY    omeprazole (PriLOSEC) 20 mg delayed release capsule TAKE 1 CAPSULE EVERY DAY    TRUEplus Lancets 30G MISC Use 2 (two) times a day DX: R73 03     No current facility-administered medications on file prior to visit  She is allergic to honey tussin [dextromethorphan], nuts - food allergy, penicillins, and shrimp extract allergy skin test - food allergy       Review of Systems   Skin: Positive for rash  All other systems reviewed and are negative  Objective:      Pulse 102   Temp 97 8 °F (36 6 °C)   SpO2 99%          Physical Exam  Vitals reviewed  Constitutional:       Appearance: Normal appearance  She is obese  Cardiovascular:      Rate and Rhythm: Normal rate and regular rhythm  Pulses: Normal pulses  Heart sounds: Normal heart sounds  Musculoskeletal:         General: Normal range of motion  Skin:     General: Skin is warm and dry  Comments: Red raised rash noted to left upper thigh and small rash noted to left upper arm no vesicles noted   Neurological:      Mental Status: She is alert

## 2022-06-01 NOTE — PROGRESS NOTES
[facility]  coding opportunities       Chart reviewed, no opportunity found:   [address]        Patients Insurance     Medicare Insurance: [facility] Advantage

## 2022-06-02 ENCOUNTER — TELEPHONE (OUTPATIENT)
Dept: INTERNAL MEDICINE CLINIC | Facility: CLINIC | Age: 77
End: 2022-06-02

## 2022-06-02 NOTE — TELEPHONE ENCOUNTER
I called Trudy Sánchez to talk about her lab results   She did not answer and her mailbox was full so I was unable to leave a voicemail

## 2022-06-06 DIAGNOSIS — E78.2 MIXED HYPERLIPIDEMIA: ICD-10-CM

## 2022-06-06 DIAGNOSIS — E03.9 HYPOTHYROIDISM, UNSPECIFIED TYPE: Primary | ICD-10-CM

## 2022-06-06 RX ORDER — ATORVASTATIN CALCIUM 20 MG/1
20 TABLET, FILM COATED ORAL DAILY
Qty: 30 TABLET | Refills: 5 | Status: SHIPPED | OUTPATIENT
Start: 2022-06-06 | End: 2022-06-29

## 2022-06-06 RX ORDER — LEVOTHYROXINE SODIUM 0.07 MG/1
75 TABLET ORAL
Qty: 90 TABLET | Refills: 3 | Status: SHIPPED | OUTPATIENT
Start: 2022-06-06

## 2022-06-14 ENCOUNTER — OFFICE VISIT (OUTPATIENT)
Dept: INTERNAL MEDICINE CLINIC | Facility: CLINIC | Age: 77
End: 2022-06-14
Payer: COMMERCIAL

## 2022-06-14 VITALS
HEIGHT: 65 IN | WEIGHT: 253.7 LBS | BODY MASS INDEX: 42.27 KG/M2 | OXYGEN SATURATION: 97 % | HEART RATE: 91 BPM | TEMPERATURE: 97.8 F | SYSTOLIC BLOOD PRESSURE: 130 MMHG | DIASTOLIC BLOOD PRESSURE: 70 MMHG

## 2022-06-14 DIAGNOSIS — G47.33 OBSTRUCTIVE SLEEP APNEA: ICD-10-CM

## 2022-06-14 DIAGNOSIS — E66.01 MORBID OBESITY WITH BMI OF 40.0-44.9, ADULT (HCC): ICD-10-CM

## 2022-06-14 DIAGNOSIS — R21 RASH AND NONSPECIFIC SKIN ERUPTION: Primary | ICD-10-CM

## 2022-06-14 PROCEDURE — 3725F SCREEN DEPRESSION PERFORMED: CPT | Performed by: NURSE PRACTITIONER

## 2022-06-14 PROCEDURE — 99214 OFFICE O/P EST MOD 30 MIN: CPT | Performed by: NURSE PRACTITIONER

## 2022-06-14 RX ORDER — CETIRIZINE HYDROCHLORIDE 10 MG/1
10 TABLET ORAL DAILY
Qty: 30 TABLET | Refills: 3 | Status: SHIPPED | OUTPATIENT
Start: 2022-06-14 | End: 2022-07-06

## 2022-06-14 RX ORDER — PREDNISONE 10 MG/1
TABLET ORAL
Qty: 42 TABLET | Refills: 0 | Status: SHIPPED | OUTPATIENT
Start: 2022-06-14 | End: 2022-06-23

## 2022-06-14 NOTE — PROGRESS NOTES
Assessment/Plan: Will call in Prednisone for 5 days and will give her extra tablets to have on hand  Has completed 10 day course of valtrex with little improvement  Did review labs and make adjustment to meds  Will call in Zyrtec to take at night  Did see she has an appointment with Nevin Downey on the 27th did advise if she does go to that visit she can not come back to office if she is changing providers  Will follow up as needed  No problem-specific Assessment & Plan notes found for this encounter  Problem List Items Addressed This Visit        Respiratory    Obstructive sleep apnea    Relevant Orders    Ambulatory Referral to Sleep Medicine       Musculoskeletal and Integument    Rash and nonspecific skin eruption - Primary    Relevant Medications    predniSONE 10 mg tablet    cetirizine (ZyrTEC) 10 mg tablet    Other Relevant Orders    [place] Allergy Panel, Adult       Other    Morbid obesity with BMI of 40 0-44 9, adult ([facility] )            Subjective:      Patient ID: Leena Quintana is a 67 y o  female  Leena Billy is for an acute visit  She was seen around one week ago for possible shingles and was put on Valtrex  She states the areas seemed to get better but now has a rash to her legs right arm abdomen and groin  She states the rash on her right upper arm is new and just started  It is hive like and denies any blisters  She states it is itchy and is using calamine lotion  She denies any pain  She has not used any new products or eaten anything new  She does live in an apartment and her  does not have the rash  She also did have her labs done  She is having SOB and feels like mucus is stuck  She has not called pulmonary she feels this has been going on since her having covid  She is also feeling very tired all the time and is not sure why  She is not using a CPAP and has not had a sleep study done in years  She denies any chest pain or palpitations  She offers no other issues        The following portions of the patient's history were reviewed and updated as appropriate:   She  has a past medical history of Allergic, COVID-19, Hypertension, and Hypothyroid  She   Patient Active Problem List    Diagnosis Date Noted    Rash and nonspecific skin eruption 06/14/2022    Herpes zoster without complication 27/52/6147    Vitamin D deficiency 59/17/9534    Diastolic dysfunction 25/18/7635    Hyperchloremia 01/02/2021    Hyperglycemia 01/02/2021    Hyponatremia 01/01/2021    Low TSH level 01/01/2021    Bilateral pulmonary embolism ([facility] ) 12/31/2020    Pneumonia due to COVID-19 virus 12/31/2020    Hypercholesterolemia 12/31/2020    Lymphadenopathy 12/31/2020    Acquired hypothyroidism 12/31/2020    Hypokalemia 12/31/2020    Transaminitis 12/31/2020    Morbid obesity with BMI of 40 0-44 9, adult ([facility] ) 10/07/2020    Tinnitus of both ears 10/07/2020    BMI 38 0-38 9,adult 10/01/2019    Arthritis of left knee 10/01/2019    Arthralgia 10/01/2019    Nontraumatic complete tear of left rotator cuff 10/01/2019    Postmenopausal 10/01/2019    Essential hypertension 07/31/2018    Obstructive sleep apnea 05/04/2015    SVT (supraventricular tachycardia) (HCC) 04/02/2015    Elevated alkaline phosphatase level 02/26/2015    Impaired fasting glucose 02/26/2015    Palpitations 02/26/2015    Hearing loss 03/24/2014    Hyperlipidemia 03/24/2014    Esophageal reflux 03/21/2014    Hypothyroidism 03/21/2014    Morbid obesity ([facility] ) 03/21/2014     She  has a past surgical history that includes Dilation and curettage of uterus and Gallbladder surgery  Her family history includes Emphysema in her father and mother  She  reports that she has never smoked  She has never used smokeless tobacco  She reports previous alcohol use  She reports that she does not use drugs    Current Outpatient Medications   Medication Sig Dispense Refill    Alcohol Swabs (B-D SINGLE USE SWABS REGULAR) PADS Use 2 (two) times a day DX: R73 03 300 each 3    amLODIPine (NORVASC) 2 5 mg tablet TAKE 1 TABLET DAILY 90 tablet 3    atorvastatin (LIPITOR) 20 mg tablet Take 1 tablet (20 mg total) by mouth daily 30 tablet 5    Blood Glucose Calibration (True Metrix Level 2) Normal SOLN Test 3 (three) times a week Dx: R73 03 3 each 3    Blood Glucose Monitoring Suppl (True Metrix Air Glucose Meter) w/Device KIT Use 2 (two) times a day TEST 2X DAILY   DX: R73 03 1 kit 0    cetirizine (ZyrTEC) 10 mg tablet Take 1 tablet (10 mg total) by mouth daily 30 tablet 3    ferrous sulfate 324 (65 Fe) mg TAKE 1 TABLET BY MOUTH 2 TIMES A DAY BEFORE MEALS 180 tablet 1    glucose blood (True Metrix Blood Glucose Test) test strip TEST 2X DAILY    DX: R73 03 200 each 3    levothyroxine (Synthroid) 75 mcg tablet Take 1 tablet (75 mcg total) by mouth daily in the early morning 90 tablet 3    losartan (COZAAR) 25 mg tablet TAKE 1 TABLET BY MOUTH EVERY DAY 90 tablet 1    omeprazole (PriLOSEC) 20 mg delayed release capsule TAKE 1 CAPSULE EVERY DAY 90 capsule 3    predniSONE 10 mg tablet Take 5 tablets by mouth daily for 5 days 42 tablet 0    TRUEplus Lancets 30G MISC Use 2 (two) times a day DX: R73 03 200 each 3    albuterol (PROVENTIL HFA,VENTOLIN HFA) 90 mcg/act inhaler Inhale 2 puffs every 4 (four) hours as needed for wheezing (Patient not taking: No sig reported) 1 Inhaler 0    apixaban (ELIQUIS) 5 mg Take 1 tablet (5 mg) twice daily (Patient not taking: Reported on 6/14/2022) 180 tablet 3     No current facility-administered medications for this visit       Current Outpatient Medications on File Prior to Visit   Medication Sig    Alcohol Swabs (B-D SINGLE USE SWABS REGULAR) PADS Use 2 (two) times a day DX: R73 03    amLODIPine (NORVASC) 2 5 mg tablet TAKE 1 TABLET DAILY    atorvastatin (LIPITOR) 20 mg tablet Take 1 tablet (20 mg total) by mouth daily    Blood Glucose Calibration (True Metrix Level 2) Normal SOLN Test 3 (three) times a week Dx: R73 03    Blood Glucose Monitoring Suppl (True Metrix Air Glucose Meter) w/Device KIT Use 2 (two) times a day TEST 2X DAILY   DX: R73 03    ferrous sulfate 324 (65 Fe) mg TAKE 1 TABLET BY MOUTH 2 TIMES A DAY BEFORE MEALS    glucose blood (True Metrix Blood Glucose Test) test strip TEST 2X DAILY    DX: R73 03    levothyroxine (Synthroid) 75 mcg tablet Take 1 tablet (75 mcg total) by mouth daily in the early morning    losartan (COZAAR) 25 mg tablet TAKE 1 TABLET BY MOUTH EVERY DAY    omeprazole (PriLOSEC) 20 mg delayed release capsule TAKE 1 CAPSULE EVERY DAY    TRUEplus Lancets 30G MISC Use 2 (two) times a day DX: R73 03    albuterol (PROVENTIL HFA,VENTOLIN HFA) 90 mcg/act inhaler Inhale 2 puffs every 4 (four) hours as needed for wheezing (Patient not taking: No sig reported)    apixaban (ELIQUIS) 5 mg Take 1 tablet (5 mg) twice daily (Patient not taking: Reported on 6/14/2022)    [DISCONTINUED] valACYclovir (VALTREX) 1,000 mg tablet Take 1 tablet (1,000 mg total) by mouth 3 (three) times a day for 7 days     No current facility-administered medications on file prior to visit  She is allergic to honey tussin [dextromethorphan], nuts - food allergy, penicillins, and shrimp extract allergy skin test - food allergy       Review of Systems   Constitutional: Positive for fatigue  Skin: Positive for rash  All other systems reviewed and are negative  Objective:      /70 (BP Location: Left arm, Patient Position: Sitting, Cuff Size: Large)   Pulse 91   Temp 97 8 °F (36 6 °C)   Ht 5' 5" (1 651 m)   Wt 115 kg (253 lb 11 2 oz)   SpO2 97%   BMI 42 22 kg/m²          Physical Exam  Vitals reviewed  Constitutional:       Appearance: Normal appearance  She is obese  HENT:      Head: Normocephalic and atraumatic        Right Ear: Tympanic membrane, ear canal and external ear normal       Left Ear: Tympanic membrane, ear canal and external ear normal       Nose: Nose normal       Mouth/Throat: Mouth: Mucous membranes are moist       Pharynx: Oropharynx is clear  Eyes:      Extraocular Movements: Extraocular movements intact  Conjunctiva/sclera: Conjunctivae normal       Pupils: Pupils are equal, round, and reactive to light  Cardiovascular:      Rate and Rhythm: Normal rate and regular rhythm  Pulses: Normal pulses  Heart sounds: Normal heart sounds  Pulmonary:      Effort: Pulmonary effort is normal       Breath sounds: Normal breath sounds  Abdominal:      General: Abdomen is flat  Bowel sounds are normal       Palpations: Abdomen is soft  Musculoskeletal:         General: Normal range of motion  Cervical back: Normal range of motion and neck supple  Skin:     General: Skin is warm and dry  Capillary Refill: Capillary refill takes less than 2 seconds  Comments: Hive like rash noted right upper arm no vesicles noted   Neurological:      General: No focal deficit present  Mental Status: She is alert and oriented to person, place, and time  Mental status is at baseline  Psychiatric:         Mood and Affect: Mood normal          Behavior: Behavior normal          Thought Content: Thought content normal          Judgment: Judgment normal          BMI Counseling: Body mass index is 42 22 kg/m²  The BMI is above normal  Nutrition recommendations include reducing portion sizes, decreasing overall calorie intake, 3-5 servings of fruits/vegetables daily, reducing fast food intake, consuming healthier snacks, decreasing soda and/or juice intake, moderation in carbohydrate intake, increasing intake of lean protein, reducing intake of saturated fat and trans fat and reducing intake of cholesterol

## 2022-06-14 NOTE — PATIENT INSTRUCTIONS
Low Fat Diet   AMBULATORY CARE:   A low-fat diet  is an eating plan that is low in total fat, unhealthy fat, and cholesterol  You may need to follow a low-fat diet if you have trouble digesting or absorbing fat  You may also need to follow this diet if you have high cholesterol  You can also lower your cholesterol by increasing the amount of fiber in your diet  Soluble fiber is a type of fiber that helps to decrease cholesterol levels  Different types of fat in food:   · Limit unhealthy fats  A diet that is high in cholesterol, saturated fat, and trans fat may cause unhealthy cholesterol levels  Unhealthy cholesterol levels increase your risk of heart disease  ? Cholesterol:  Limit intake of cholesterol to less than 200 mg per day  Cholesterol is found in meat, eggs, and dairy  ? Saturated fat:  Limit saturated fat to less than 7% of your total daily calories  Ask your dietitian how many calories you need each day  Saturated fat is found in butter, cheese, ice cream, whole milk, and palm oil  Saturated fat is also found in meat, such as beef, pork, chicken skin, and processed meats  Processed meats include sausage, hot dogs, and bologna  ? Trans fat:  Avoid trans fat as much as possible  Trans fat is used in fried and baked foods  Foods that say trans fat free on the label may still have up to 0 5 grams of trans fat per serving  · Include healthy fats  Replace foods that are high in saturated and trans fat with foods high in healthy fats  This may help to decrease high cholesterol levels  ? Monounsaturated fats: These are found in avocados, nuts, and vegetable oils, such as olive, canola, and sunflower oil  ? Polyunsaturated fats: These can be found in vegetable oils, such as soybean or corn oil  Omega-3 fats can help to decrease the risk of heart disease  Omega-3 fats are found in fish, such as salmon, herring, trout, and tuna   Omega-3 fats can also be found in plant foods, such as walnuts, flaxseed, soybeans, and canola oil  Foods to limit or avoid:   · Grains:      ? Snacks that are made with partially hydrogenated oils, such as chips, regular crackers, and butter-flavored popcorn    ? High-fat baked goods, such as biscuits, croissants, doughnuts, pies, cookies, and pastries    · Dairy:      ? Whole milk, 2% milk, and yogurt and ice cream made with whole milk    ? Half and half creamer, heavy cream, and whipping cream    ? Cheese, cream cheese, and sour cream    · Meats and proteins:      ? High-fat cuts of meat (T-bone steak, regular hamburger, and ribs)    ? Fried meat, poultry (turkey and chicken), and fish    ? Poultry (chicken and turkey) with skin    ? Cold cuts (salami or bologna), hot dogs, cedillo, and sausage    ? Whole eggs and egg yolks    · Vegetables and fruits with added fat:      ? Fried vegetables or vegetables in butter or high-fat sauces, such as cream or cheese sauces    ? Fried fruit or fruit served with butter or cream    · Fats:      ? Butter, stick margarine, and shortening    ? Coconut, palm oil, and palm kernel oil    Foods to include:   · Grains:      ? Whole-grain breads, cereals, pasta, and brown rice    ? Low-fat crackers and pretzels    · Vegetables and fruits:      ? Fresh, frozen, or canned vegetables (no salt or low-sodium)    ? Fresh, frozen, dried, or canned fruit (canned in light syrup or fruit juice)    ? Avocado    · Low-fat dairy products:      ? Nonfat (skim) or 1% milk    ? Nonfat or low-fat cheese, yogurt, and cottage cheese    · Meats and proteins:      ? Chicken or turkey with no skin    ? Baked or broiled fish    ? Lean beef and pork (loin, round, extra lean hamburger)    ? Beans and peas, unsalted nuts, soy products    ? Egg whites and substitutes    ? Seeds and nuts    · Fats:      ? Unsaturated oil, such as canola, olive, peanut, soybean, or sunflower oil    ? Soft or liquid margarine and vegetable oil spread    ?  Low-fat salad dressing    Other ways to decrease fat:   · Read food labels before you buy foods  Choose foods that have less than 30% of calories from fat  Choose low-fat or fat-free dairy products  Remember that fat free does not mean calorie free  These foods still contain calories, and too many calories can lead to weight gain  · Trim fat from meat and avoid fried food  Trim all visible fat from meat before you cook it  Remove the skin from poultry  Do not muller meat, fish, or poultry  Bake, roast, boil, or broil these foods instead  Avoid fried foods  Eat a baked potato instead of Western Melisa fries  Steam vegetables instead of sautéing them in butter  · Add less fat to foods  Use imitation cedillo bits on salads and baked potatoes instead of regular cedillo bits  Use fat-free or low-fat salad dressings instead of regular dressings  Use low-fat or nonfat butter-flavored topping instead of regular butter or margarine on popcorn and other foods  Ways to decrease fat in recipes:  Replace high-fat ingredients with low-fat or nonfat ones  This may cause baked goods to be drier than usual  You may need to use nonfat cooking spray on pans to prevent food from sticking  You also may need to change the amount of other ingredients, such as water, in the recipe  Try the following:  · Use low-fat or light margarine instead of regular margarine or shortening  · Use lean ground turkey breast or chicken, or lean ground beef (less than 5% fat) instead of hamburger  · Add 1 teaspoon of canola oil to 8 ounces of skim milk instead of using cream or half and half  · Use grated zucchini, carrots, or apples in breads instead of coconut  · Use blenderized, low-fat cottage cheese, plain tofu, or low-fat ricotta cheese instead of cream cheese  · Use 1 egg white and 1 teaspoon of canola oil, or use ¼ cup (2 ounces) of fat-free egg substitute instead of a whole egg       · Replace half of the oil that is called for in a recipe with applesauce when you bake  Use 3 tablespoons of cocoa powder and 1 tablespoon of canola oil instead of a square of baking chocolate  How to increase fiber:  Eat enough high-fiber foods to get 20 to 30 grams of fiber every day  Slowly increase your fiber intake to avoid stomach cramps, gas, and other problems  · Eat 3 ounces of whole-grain foods each day  An ounce is about 1 slice of bread  Eat whole-grain breads, such as whole-wheat bread  Whole wheat, whole-wheat flour, or other whole grains should be listed as the first ingredient on the food label  Replace white flour with whole-grain flour or use half of each in recipes  Whole-grain flour is heavier than white flour, so you may have to add more yeast or baking powder  · Eat a high-fiber cereal for breakfast   Oatmeal is a good source of soluble fiber  Look for cereals that have bran or fiber in the name  Choose whole-grain products, such as brown rice, barley, and whole-wheat pasta  · Eat more beans, peas, and lentils  For example, add beans to soups or salads  Eat at least 5 cups of fruits and vegetables each day  Eat fruits and vegetables with the peel because the peel is high in fiber  © Copyright [employer] 2022 Information is for End User's use only and may not be sold, redistributed or otherwise used for commercial purposes  All illustrations and images included in CareNotes® are the copyrighted property of A D A M , Inc  or [address] [place], [place] [address]sharona   The above information is an  only  It is not intended as medical advice for individual conditions or treatments  Talk to your doctor, nurse or pharmacist before following any medical regimen to see if it is safe and effective for you  Heart Healthy Diet   AMBULATORY CARE:   A heart healthy diet  is an eating plan low in unhealthy fats and sodium (salt)  The plan is high in healthy fats and fiber   A heart healthy diet helps improve your cholesterol levels and lowers your risk for heart disease and stroke  A dietitian will teach you how to read and understand food labels  Heart healthy diet guidelines to follow:   · Choose foods that contain healthy fats  ? Unsaturated fats  include monounsaturated and polyunsaturated fats  Unsaturated fat is found in foods such as soybean, canola, olive, corn, and safflower oils  It is also found in soft tub margarine that is made with liquid vegetable oil  ? Omega-3 fat  is found in certain fish, such as salmon, tuna, and trout, and in walnuts and flaxseed  Eat fish high in omega-3 fats at least 2 times a week  · Get 20 to 30 grams of fiber each day  Fruits, vegetables, whole-grain foods, and legumes (cooked beans) are good sources of fiber  · Limit or do not have unhealthy fats  ? Cholesterol  is found in animal foods, such as eggs and lobster, and in dairy products made from whole milk  Limit cholesterol to less than 200 mg each day  ? Saturated fat  is found in meats, such as cedillo and hamburger  It is also found in chicken or turkey skin, whole milk, and butter  Limit saturated fat to less than 7% of your total daily calories  ? Trans fat  is found in packaged foods, such as potato chips and cookies  It is also in hard margarine, some fried foods, and shortening  Do not eat foods that contain trans fats  · Limit sodium as directed  You may be told to limit sodium to 2,000 to 2,300 mg each day  Choose low-sodium or no-salt-added foods  Add little or no salt to food you prepare  Use herbs and spices in place of salt  Include the following in your heart healthy plan:  Ask your dietitian or healthcare provider how many servings to have from each of the following food groups:  · Grains:      ? Whole-wheat breads, cereals, and pastas, and brown rice    ? Low-fat, low-sodium crackers and chips    · Vegetables:      ? Broccoli, green beans, green peas, and spinach    ? Collards, kale, and lima beans    ?  Carrots, sweet potatoes, tomatoes, and peppers    ? Canned vegetables with no salt added    · Fruits:      ? Bananas, peaches, pears, and pineapple    ? Grapes, raisins, and dates    ? Oranges, tangerines, grapefruit, orange juice, and grapefruit juice    ? Apricots, mangoes, melons, and papaya    ? Raspberries and strawberries    ? Canned fruit with no added sugar    · Low-fat dairy:      ? Nonfat (skim) milk, 1% milk, and low-fat almond, cashew, or soy milks fortified with calcium    ? Low-fat cheese, regular or frozen yogurt, and cottage cheese    · Meats and proteins:      ? Lean cuts of beef and pork (loin, leg, round), skinless chicken and turkey    ? Legumes, soy products, egg whites, or nuts    Limit or do not include the following in your heart healthy plan:   · Unhealthy fats and oils:      ? Whole or 2% milk, cream cheese, sour cream, or cheese    ? High-fat cuts of beef (T-bone steaks, ribs), chicken or turkey with skin, and organ meats such as liver    ? Butter, stick margarine, shortening, and cooking oils such as coconut or palm oil    · Foods and liquids high in sodium:      ? Packaged foods, such as frozen dinners, cookies, macaroni and cheese, and cereals with more than 300 mg of sodium per serving    ? Vegetables with added sodium, such as instant potatoes, vegetables with added sauces, or regular canned vegetables    ? Cured or smoked meats, such as hot dogs, cedillo, and sausage    ? High-sodium ketchup, barbecue sauce, salad dressing, pickles, olives, soy sauce, or miso    · Foods and liquids high in sugar:      ? Candy, cake, cookies, pies, or doughnuts    ? Soft drinks (soda), sports drinks, or sweetened tea    ? Canned or dry mixes for cakes, soups, sauces, or gravies    Other healthy heart guidelines:   · Do not smoke  Nicotine and other chemicals in cigarettes and cigars can cause lung and heart damage  Ask your healthcare provider for information if you currently smoke and need help to quit  E-cigarettes or smokeless tobacco still contain nicotine  Talk to your healthcare provider before you use these products  · Limit or do not drink alcohol as directed  Alcohol can damage your heart and raise your blood pressure  Your healthcare provider may give you specific daily and weekly limits  The general recommended limit is 1 drink a day for women 21 or older and for men 72 or older  Do not have more than 3 drinks in a day or 7 in a week  The recommended limit is 2 drinks a day for men 24to 59years of age  Do not have more than 4 drinks in a day or 14 in a week  A drink of alcohol is 12 ounces of beer, 5 ounces of wine, or 1½ ounces of liquor  · Exercise regularly  Exercise can help you maintain a healthy weight and improve your blood pressure and cholesterol levels  Regular exercise can also decrease your risk for heart problems  Ask your healthcare provider about the best exercise plan for you  Do not start an exercise program without asking your healthcare provider  Follow up with your doctor or cardiologist as directed:  Write down your questions so you remember to ask them during your visits  © Copyright [employer] 2022 Information is for End User's use only and may not be sold, redistributed or otherwise used for commercial purposes  All illustrations and images included in CareNotes® are the copyrighted property of A D A M , Inc  or [facility]   The above information is an  only  It is not intended as medical advice for individual conditions or treatments  Talk to your doctor, nurse or pharmacist before following any medical regimen to see if it is safe and effective for you

## 2022-06-15 ENCOUNTER — TELEPHONE (OUTPATIENT)
Dept: INTERNAL MEDICINE CLINIC | Facility: CLINIC | Age: 77
End: 2022-06-15

## 2022-06-15 ENCOUNTER — APPOINTMENT (OUTPATIENT)
Dept: LAB | Facility: HOSPITAL | Age: 77
End: 2022-06-15
Payer: COMMERCIAL

## 2022-06-15 DIAGNOSIS — R21 RASH AND NONSPECIFIC SKIN ERUPTION: ICD-10-CM

## 2022-06-15 PROCEDURE — 86003 ALLG SPEC IGE CRUDE XTRC EA: CPT

## 2022-06-15 PROCEDURE — 36415 COLL VENOUS BLD VENIPUNCTURE: CPT

## 2022-06-15 PROCEDURE — 82785 ASSAY OF IGE: CPT

## 2022-06-15 NOTE — TELEPHONE ENCOUNTER
Candance Hake called and left a voicemail  I called her back and her mailbox was full so I was unable to leave a voicemail

## 2022-06-16 ENCOUNTER — TELEPHONE (OUTPATIENT)
Dept: INTERNAL MEDICINE CLINIC | Facility: CLINIC | Age: 77
End: 2022-06-16

## 2022-06-16 LAB

## 2022-06-16 NOTE — TELEPHONE ENCOUNTER
Vannesa Bailey called back from the message I left her  I was checking on how her rash was doing after the medication that Tong Pritchard called in  She stated that half of them are gone, there is no more itching and after the first day of taking them she saw some of them started to go down  She did want to let us know that she went and got blood test for allergies so Tong Pritchard should get the results for that

## 2022-06-23 ENCOUNTER — OFFICE VISIT (OUTPATIENT)
Dept: INTERNAL MEDICINE CLINIC | Facility: CLINIC | Age: 77
End: 2022-06-23
Payer: COMMERCIAL

## 2022-06-23 VITALS — TEMPERATURE: 97.1 F | HEART RATE: 106 BPM | OXYGEN SATURATION: 97 %

## 2022-06-23 DIAGNOSIS — R21 RASH AND NONSPECIFIC SKIN ERUPTION: Primary | ICD-10-CM

## 2022-06-23 PROCEDURE — 1036F TOBACCO NON-USER: CPT | Performed by: NURSE PRACTITIONER

## 2022-06-23 PROCEDURE — 99214 OFFICE O/P EST MOD 30 MIN: CPT | Performed by: NURSE PRACTITIONER

## 2022-06-23 PROCEDURE — 1160F RVW MEDS BY RX/DR IN RCRD: CPT | Performed by: NURSE PRACTITIONER

## 2022-06-23 RX ORDER — PREDNISONE 10 MG/1
TABLET ORAL
Qty: 18 TABLET | Refills: 0 | Status: SHIPPED | OUTPATIENT
Start: 2022-06-23 | End: 2022-06-30

## 2022-06-23 NOTE — PROGRESS NOTES
Assessment/Plan: Will call another course of steroids in for her with a longer taper  Did advise if rash does come back to call office and will send to an allergist or dermatology  Will follow up as needed  No problem-specific Assessment & Plan notes found for this encounter  Problem List Items Addressed This Visit        Musculoskeletal and Integument    Rash and nonspecific skin eruption - Primary    Relevant Medications    predniSONE 10 mg tablet            Subjective:      Patient ID: Lisset Quintana is a 67 y o  female  Lisset Amanda is for an acute visit  She states she took her course of steroids and is taking Zyrtec and her rash did go away  She states this am she woke up with spots on her left and right arms  She states they are itching  She has not used any new products and is washing her clothes in sensitive washes  She does use Olay body wash as well  She offers no fever  She offers no other issues  The following portions of the patient's history were reviewed and updated as appropriate:   She  has a past medical history of Allergic, COVID-19, Hypertension, and Hypothyroid    She   Patient Active Problem List    Diagnosis Date Noted    Rash and nonspecific skin eruption 06/14/2022    Herpes zoster without complication 56/10/6241    Vitamin D deficiency 22/26/7731    Diastolic dysfunction 54/49/6217    Hyperchloremia 01/02/2021    Hyperglycemia 01/02/2021    Hyponatremia 01/01/2021    Low TSH level 01/01/2021    [facility] 12/31/2020    Pneumonia due to COVID-19 virus 12/31/2020    Hypercholesterolemia 12/31/2020    Lymphadenopathy 12/31/2020    Acquired hypothyroidism 12/31/2020    Hypokalemia 12/31/2020    Transaminitis 12/31/2020    Morbid obesity with BMI of 40 0-44 9, adult ([facility] ) 10/07/2020    Tinnitus of both ears 10/07/2020    BMI 38 0-38 9,adult 10/01/2019    Arthritis of left knee 10/01/2019    Arthralgia 10/01/2019    Nontraumatic complete tear of left rotator cuff 10/01/2019    Postmenopausal 10/01/2019    Essential hypertension 07/31/2018    Obstructive sleep apnea 05/04/2015    SVT (supraventricular tachycardia) (HCC) 04/02/2015    Elevated alkaline phosphatase level 02/26/2015    Impaired fasting glucose 02/26/2015    Palpitations 02/26/2015    Hearing loss 03/24/2014    Hyperlipidemia 03/24/2014    Esophageal reflux 03/21/2014    Hypothyroidism 03/21/2014    Morbid obesity ([address] ) 03/21/2014     She  has a past surgical history that includes Dilation and curettage of uterus and Gallbladder surgery  Her family history includes Emphysema in her father and mother  She  reports that she has never smoked  She has never used smokeless tobacco  She reports previous alcohol use  She reports that she does not use drugs    Current Outpatient Medications   Medication Sig Dispense Refill    predniSONE 10 mg tablet 30 mg by mouth daily for 3 days, then 20 mg by mouth daily for 3 days, then 10 mg by mouth daily for 3 days, then stop 18 tablet 0    albuterol (PROVENTIL HFA,VENTOLIN HFA) 90 mcg/act inhaler Inhale 2 puffs every 4 (four) hours as needed for wheezing (Patient not taking: No sig reported) 1 Inhaler 0    Alcohol Swabs (B-D SINGLE USE SWABS REGULAR) PADS Use 2 (two) times a day DX: R73 03 300 each 3    amLODIPine (NORVASC) 2 5 mg tablet TAKE 1 TABLET DAILY 90 tablet 3    apixaban (ELIQUIS) 5 mg Take 1 tablet (5 mg) twice daily (Patient not taking: Reported on 6/14/2022) 180 tablet 3    atorvastatin (LIPITOR) 20 mg tablet Take 1 tablet (20 mg total) by mouth daily 30 tablet 5    Blood Glucose Calibration (True Metrix Level 2) Normal SOLN Test 3 (three) times a week Dx: R73 03 3 each 3    Blood Glucose Monitoring Suppl (True Metrix Air Glucose Meter) w/Device KIT Use 2 (two) times a day TEST 2X DAILY   DX: R73 03 1 kit 0    cetirizine (ZyrTEC) 10 mg tablet Take 1 tablet (10 mg total) by mouth daily 30 tablet 3    ferrous sulfate 324 (65 Fe) mg TAKE 1 TABLET BY MOUTH 2 TIMES A DAY BEFORE MEALS 180 tablet 1    glucose blood (True Metrix Blood Glucose Test) test strip TEST 2X DAILY    DX: R73 03 200 each 3    levothyroxine (Synthroid) 75 mcg tablet Take 1 tablet (75 mcg total) by mouth daily in the early morning 90 tablet 3    losartan (COZAAR) 25 mg tablet TAKE 1 TABLET BY MOUTH EVERY DAY 90 tablet 1    omeprazole (PriLOSEC) 20 mg delayed release capsule TAKE 1 CAPSULE EVERY DAY 90 capsule 3    TRUEplus Lancets 30G MISC Use 2 (two) times a day DX: R73 03 200 each 3     No current facility-administered medications for this visit       Current Outpatient Medications on File Prior to Visit   Medication Sig    albuterol (PROVENTIL HFA,VENTOLIN HFA) 90 mcg/act inhaler Inhale 2 puffs every 4 (four) hours as needed for wheezing (Patient not taking: No sig reported)    Alcohol Swabs (B-D SINGLE USE SWABS REGULAR) PADS Use 2 (two) times a day DX: R73 03    amLODIPine (NORVASC) 2 5 mg tablet TAKE 1 TABLET DAILY    apixaban (ELIQUIS) 5 mg Take 1 tablet (5 mg) twice daily (Patient not taking: Reported on 6/14/2022)    atorvastatin (LIPITOR) 20 mg tablet Take 1 tablet (20 mg total) by mouth daily    Blood Glucose Calibration (True Metrix Level 2) Normal SOLN Test 3 (three) times a week Dx: R73 03    Blood Glucose Monitoring Suppl (True Metrix Air Glucose Meter) w/Device KIT Use 2 (two) times a day TEST 2X DAILY   DX: R73 03    cetirizine (ZyrTEC) 10 mg tablet Take 1 tablet (10 mg total) by mouth daily    ferrous sulfate 324 (65 Fe) mg TAKE 1 TABLET BY MOUTH 2 TIMES A DAY BEFORE MEALS    glucose blood (True Metrix Blood Glucose Test) test strip TEST 2X DAILY    DX: R73 03    levothyroxine (Synthroid) 75 mcg tablet Take 1 tablet (75 mcg total) by mouth daily in the early morning    losartan (COZAAR) 25 mg tablet TAKE 1 TABLET BY MOUTH EVERY DAY    omeprazole (PriLOSEC) 20 mg delayed release capsule TAKE 1 CAPSULE EVERY DAY    TRUEplus Lancets 30G MISC Use 2 (two) times a day DX: R73 03    [DISCONTINUED] predniSONE 10 mg tablet Take 5 tablets by mouth daily for 5 days     No current facility-administered medications on file prior to visit  She is allergic to honey tussin [dextromethorphan], nuts - food allergy, penicillins, and shrimp extract allergy skin test - food allergy       Review of Systems   Skin: Positive for rash  All other systems reviewed and are negative  Objective:      Pulse (!) 106   Temp (!) 97 1 °F (36 2 °C)   SpO2 97%          Physical Exam  Constitutional:       Appearance: Normal appearance  She is obese  Cardiovascular:      Rate and Rhythm: Normal rate and regular rhythm  Pulses: Normal pulses  Heart sounds: Normal heart sounds  Skin:     General: Skin is warm and dry  Capillary Refill: Capillary refill takes less than 2 seconds  Comments: Rash noted to left outer arm and right upper arm   Neurological:      General: No focal deficit present  Mental Status: She is alert and oriented to person, place, and time  Mental status is at baseline  Psychiatric:         Mood and Affect: Mood normal          Behavior: Behavior normal          Thought Content:  Thought content normal          Judgment: Judgment normal

## 2022-06-25 ENCOUNTER — TELEPHONE (OUTPATIENT)
Dept: OTHER | Facility: OTHER | Age: 77
End: 2022-06-25

## 2022-06-27 DIAGNOSIS — I10 ESSENTIAL HYPERTENSION: ICD-10-CM

## 2022-06-27 RX ORDER — LOSARTAN POTASSIUM 25 MG/1
TABLET ORAL
Qty: 90 TABLET | Refills: 1 | Status: SHIPPED | OUTPATIENT
Start: 2022-06-27

## 2022-06-29 ENCOUNTER — TELEPHONE (OUTPATIENT)
Dept: INTERNAL MEDICINE CLINIC | Facility: CLINIC | Age: 77
End: 2022-06-29

## 2022-06-29 DIAGNOSIS — E78.2 MIXED HYPERLIPIDEMIA: ICD-10-CM

## 2022-06-29 RX ORDER — ATORVASTATIN CALCIUM 20 MG/1
TABLET, FILM COATED ORAL
Qty: 90 TABLET | Refills: 2 | Status: SHIPPED | OUTPATIENT
Start: 2022-06-29

## 2022-06-29 NOTE — TELEPHONE ENCOUNTER
Cassius Emperatriz called at least twice today regarding the rash  I spoke camila her once and informed her that I would speak to you  She had left another message regarding the same issue  She stated the rash is starting again  Stated there are two spots starting and she is worried with the weekend coming  Stated she cannot see dermatology until after the third secondary to waiting for her check  Stated she has one more day for the prednisone and would like a refill    Informed her that she needs to call derm and you will refill, but only this time

## 2022-06-30 DIAGNOSIS — R21 RASH AND NONSPECIFIC SKIN ERUPTION: Primary | ICD-10-CM

## 2022-06-30 RX ORDER — METHYLPREDNISOLONE 4 MG/1
TABLET ORAL
Qty: 21 EACH | Refills: 0 | Status: SHIPPED | OUTPATIENT
Start: 2022-06-30

## 2022-07-04 DIAGNOSIS — E03.9 HYPOTHYROIDISM, UNSPECIFIED TYPE: Primary | ICD-10-CM

## 2022-07-04 RX ORDER — LEVOTHYROXINE SODIUM 0.1 MG/1
TABLET ORAL
Qty: 90 TABLET | Refills: 0 | Status: SHIPPED | OUTPATIENT
Start: 2022-07-04 | End: 2022-08-23

## 2022-07-06 ENCOUNTER — TELEPHONE (OUTPATIENT)
Dept: INTERNAL MEDICINE CLINIC | Facility: CLINIC | Age: 77
End: 2022-07-06

## 2022-07-06 DIAGNOSIS — R21 RASH AND NONSPECIFIC SKIN ERUPTION: ICD-10-CM

## 2022-07-06 RX ORDER — CETIRIZINE HYDROCHLORIDE 10 MG/1
TABLET ORAL
Qty: 90 TABLET | Refills: 2 | Status: SHIPPED | OUTPATIENT
Start: 2022-07-06

## 2022-07-06 NOTE — TELEPHONE ENCOUNTER
Patient called stating that she has an appointment with Dr Hernandez/Dermatologist 8/3/22 1pm and said that the Derm office said that she should stay on steroids until she is seen on 8/3  I called Dr Yumiko Lagos office and they said that they never said it and that they would never give advise nor medication until the patient is seen  Then I called and spoke to patient's  and informed him of this  He said that they thought patient could be on steroids until she sees Derm because Jose Lopez told them that some people are on steroids a long time  I told him that it's not good to be on steroids long period of time   said that now the information is all different  I told him that I have not spoken to Jose Lopez about this yet because I called Derm first   Now I will consult Jose Lopez and call him back  I consulted Jose Lopez and she said that she will not put patient back on steroids and that she wants patient to see an allergist that maybe she could be seen sooner than August   I gave  number for Dr Rogena Spatz in [place] because they don't want to drive to [facility] nor [place]

## 2022-07-19 ENCOUNTER — OFFICE VISIT (OUTPATIENT)
Dept: INTERNAL MEDICINE CLINIC | Facility: CLINIC | Age: 77
End: 2022-07-19
Payer: COMMERCIAL

## 2022-07-19 VITALS
BODY MASS INDEX: 42.33 KG/M2 | DIASTOLIC BLOOD PRESSURE: 70 MMHG | OXYGEN SATURATION: 98 % | WEIGHT: 254.4 LBS | SYSTOLIC BLOOD PRESSURE: 122 MMHG | TEMPERATURE: 97.9 F | HEART RATE: 105 BPM

## 2022-07-19 DIAGNOSIS — Z00.00 MEDICARE ANNUAL WELLNESS VISIT, SUBSEQUENT: Primary | ICD-10-CM

## 2022-07-19 PROCEDURE — 3288F FALL RISK ASSESSMENT DOCD: CPT | Performed by: NURSE PRACTITIONER

## 2022-07-19 PROCEDURE — 3725F SCREEN DEPRESSION PERFORMED: CPT | Performed by: NURSE PRACTITIONER

## 2022-07-19 PROCEDURE — 1170F FXNL STATUS ASSESSED: CPT | Performed by: NURSE PRACTITIONER

## 2022-07-19 PROCEDURE — 1125F AMNT PAIN NOTED PAIN PRSNT: CPT | Performed by: NURSE PRACTITIONER

## 2022-07-19 PROCEDURE — G0439 PPPS, SUBSEQ VISIT: HCPCS | Performed by: NURSE PRACTITIONER

## 2022-07-19 NOTE — PATIENT INSTRUCTIONS
Medicare Preventive Visit Patient Instructions  Thank you for completing your Welcome to Medicare Visit or Medicare Annual Wellness Visit today  Your next wellness visit will be due in one year (7/20/2023)  The screening/preventive services that you may require over the next 5-10 years are detailed below  Some tests may not apply to you based off risk factors and/or age  Screening tests ordered at today's visit but not completed yet may show as past due  Also, please note that scanned in results may not display below  Preventive Screenings:  Service Recommendations Previous Testing/Comments   Colorectal Cancer Screening  * Colonoscopy    * Fecal Occult Blood Test (FOBT)/Fecal Immunochemical Test (FIT)  * Fecal DNA/Cologuard Test  * Flexible Sigmoidoscopy Age: 54-65 years old   Colonoscopy: every 10 years (may be performed more frequently if at higher risk)  OR  FOBT/FIT: every 1 year  OR  Cologuard: every 3 years  OR  Sigmoidoscopy: every 5 years  Screening may be recommended earlier than age 48 if at higher risk for colorectal cancer  Also, an individualized decision between you and your healthcare provider will decide whether screening between the ages of 74-80 would be appropriate  Colonoscopy: Not on file  FOBT/FIT: Not on file  Cologuard: Not on file  Sigmoidoscopy: Not on file          Breast Cancer Screening Age: 36 years old  Frequency: every 1-2 years  Not required if history of left and right mastectomy Mammogram: Not on file        Cervical Cancer Screening Between the ages of 21-29, pap smear recommended once every 3 years  Between the ages of 33-67, can perform pap smear with HPV co-testing every 5 years     Recommendations may differ for women with a history of total hysterectomy, cervical cancer, or abnormal pap smears in past  Pap Smear: Not on file    Screening Not Indicated   Hepatitis C Screening Once for adults born between [facility]  More frequently in patients at high risk for Hepatitis C Hep C Antibody: 01/01/2021    Screening Current   Diabetes Screening 1-2 times per year if you're at risk for diabetes or have pre-diabetes Fasting glucose: 121 mg/dL   A1C: 5 8 %    Screening Current   Cholesterol Screening Once every 5 years if you don't have a lipid disorder  May order more often based on risk factors  Lipid panel: 06/01/2022    Screening Not Indicated  History Lipid Disorder     Other Preventive Screenings Covered by Medicare:  1  Abdominal Aortic Aneurysm (AAA) Screening: covered once if your at risk  You're considered to be at risk if you have a family history of AAA  2  Lung Cancer Screening: covers low dose CT scan once per year if you meet all of the following conditions: (1) Age 50-69; (2) No signs or symptoms of lung cancer; (3) Current smoker or have quit smoking within the last 15 years; (4) You have a tobacco smoking history of at least 30 pack years (packs per day multiplied by number of years you smoked); (5) You get a written order from a healthcare provider  3  Glaucoma Screening: covered annually if you're considered high risk: (1) You have diabetes OR (2) Family history of glaucoma OR (3)  aged 48 and older OR (3)  American aged 72 and older  3  Osteoporosis Screening: covered every 2 years if you meet one of the following conditions: (1) You're estrogen deficient and at risk for osteoporosis based off medical history and other findings; (2) Have a vertebral abnormality; (3) On glucocorticoid therapy for more than 3 months; (4) Have primary hyperparathyroidism; (5) On osteoporosis medications and need to assess response to drug therapy  · Last bone density test (DXA Scan): Not on file  5  HIV Screening: covered annually if you're between the age of 12-76  Also covered annually if you are younger than 13 and older than 72 with risk factors for HIV infection   For pregnant patients, it is covered up to 3 times per pregnancy  Immunizations:  Immunization Recommendations   Influenza Vaccine Annual influenza vaccination during flu season is recommended for all persons aged >= 6 months who do not have contraindications   Pneumococcal Vaccine (Prevnar and Pneumovax)  * Prevnar = PCV13  * Pneumovax = PPSV23   Adults 25-60 years old: 1-3 doses may be recommended based on certain risk factors  Adults 72 years old: Prevnar (PCV13) vaccine recommended followed by Pneumovax (PPSV23) vaccine  If already received PPSV23 since turning 65, then PCV13 recommended at least one year after PPSV23 dose  Hepatitis B Vaccine 3 dose series if at intermediate or high risk (ex: diabetes, end stage renal disease, liver disease)   Tetanus (Td) Vaccine - COST NOT COVERED BY MEDICARE PART B Following completion of primary series, a booster dose should be given every 10 years to maintain immunity against tetanus  Td may also be given as tetanus wound prophylaxis  Tdap Vaccine - COST NOT COVERED BY MEDICARE PART B Recommended at least once for all adults  For pregnant patients, recommended with each pregnancy  Shingles Vaccine (Shingrix) - COST NOT COVERED BY MEDICARE PART B  2 shot series recommended in those aged 48 and above     Health Maintenance Due:      Topic Date Due    Hepatitis C Screening  Completed     Immunizations Due:      Topic Date Due    Pneumococcal Vaccine: 65+ Years (2 - PPSV23 or PCV20) 10/03/2018    COVID-19 Vaccine (4 - Booster for Moderna series) 03/09/2022    Influenza Vaccine (1) 09/01/2022     Advance Directives   What are advance directives? Advance directives are legal documents that state your wishes and plans for medical care  These plans are made ahead of time in case you lose your ability to make decisions for yourself  Advance directives can apply to any medical decision, such as the treatments you want, and if you want to donate organs  What are the types of advance directives?   There are many types of advance directives, and each state has rules about how to use them  You may choose a combination of any of the following:  · Living will: This is a written record of the treatment you want  You can also choose which treatments you do not want, which to limit, and which to stop at a certain time  This includes surgery, medicine, IV fluid, and tube feedings  · Durable power of  for healthcare [place] [facility]): This is a written record that states who you want to make healthcare choices for you when you are unable to make them for yourself  This person, called a proxy, is usually a family member or a friend  You may choose more than 1 proxy  · Do not resuscitate (DNR) order:  A DNR order is used in case your heart stops beating or you stop breathing  It is a request not to have certain forms of treatment, such as CPR  A DNR order may be included in other types of advance directives  · Medical directive: This covers the care that you want if you are in a coma, near death, or unable to make decisions for yourself  You can list the treatments you want for each condition  Treatment may include pain medicine, surgery, blood transfusions, dialysis, IV or tube feedings, and a ventilator (breathing machine)  · Values history: This document has questions about your views, beliefs, and how you feel and think about life  This information can help others choose the care that you would choose  Why are advance directives important? An advance directive helps you control your care  Although spoken wishes may be used, it is better to have your wishes written down  Spoken wishes can be misunderstood, or not followed  Treatments may be given even if you do not want them  An advance directive may make it easier for your family to make difficult choices about your care  Urinary Incontinence   Urinary incontinence (UI)  is when you lose control of your bladder   UI develops because your bladder cannot store or empty urine properly  The 3 most common types of UI are stress incontinence, urge incontinence, or both  Medicines:   · May be given to help strengthen your bladder control  Report any side effects of medication to your healthcare provider  Do pelvic muscle exercises often:  Your pelvic muscles help you stop urinating  Squeeze these muscles tight for 5 seconds, then relax for 5 seconds  Gradually work up to squeezing for 10 seconds  Do 3 sets of 15 repetitions a day, or as directed  This will help strengthen your pelvic muscles and improve bladder control  Train your bladder:  Go to the bathroom at set times, such as every 2 hours, even if you do not feel the urge to go  You can also try to hold your urine when you feel the urge to go  For example, hold your urine for 5 minutes when you feel the urge to go  As that becomes easier, hold your urine for 10 minutes  Self-care:   · Keep a UI record  Write down how often you leak urine and how much you leak  Make a note of what you were doing when you leaked urine  · Drink liquids as directed  You may need to limit the amount of liquid you drink to help control your urine leakage  Do not drink any liquid right before you go to bed  Limit or do not have drinks that contain caffeine or alcohol  · Prevent constipation  Eat a variety of high-fiber foods  Good examples are high-fiber cereals, beans, vegetables, and whole-grain breads  Walking is the best way to trigger your intestines to have a bowel movement  · Exercise regularly and maintain a healthy weight  Weight loss and exercise will decrease pressure on your bladder and help you control your leakage  · Use a catheter as directed  to help empty your bladder  A catheter is a tiny, plastic tube that is put into your bladder to drain your urine  · Go to behavior therapy as directed  Behavior therapy may be used to help you learn to control your urge to urinate      Weight Management   Why it is important to manage your weight:  Being overweight increases your risk of health conditions such as heart disease, high blood pressure, type 2 diabetes, and certain types of cancer  It can also increase your risk for osteoarthritis, sleep apnea, and other respiratory problems  Aim for a slow, steady weight loss  Even a small amount of weight loss can lower your risk of health problems  How to lose weight safely:  A safe and healthy way to lose weight is to eat fewer calories and get regular exercise  You can lose up about 1 pound a week by decreasing the number of calories you eat by 500 calories each day  Healthy meal plan for weight management:  A healthy meal plan includes a variety of foods, contains fewer calories, and helps you stay healthy  A healthy meal plan includes the following:  · Eat whole-grain foods more often  A healthy meal plan should contain fiber  Fiber is the part of grains, fruits, and vegetables that is not broken down by your body  Whole-grain foods are healthy and provide extra fiber in your diet  Some examples of whole-grain foods are whole-wheat breads and pastas, oatmeal, brown rice, and bulgur  · Eat a variety of vegetables every day  Include dark, leafy greens such as spinach, kale, sebastián greens, and mustard greens  Eat yellow and orange vegetables such as carrots, sweet potatoes, and winter squash  · Eat a variety of fruits every day  Choose fresh or canned fruit (canned in its own juice or light syrup) instead of juice  Fruit juice has very little or no fiber  · Eat low-fat dairy foods  Drink fat-free (skim) milk or 1% milk  Eat fat-free yogurt and low-fat cottage cheese  Try low-fat cheeses such as mozzarella and other reduced-fat cheeses  · Choose meat and other protein foods that are low in fat  Choose beans or other legumes such as split peas or lentils  Choose fish, skinless poultry (chicken or turkey), or lean cuts of red meat (beef or pork)   Before you cook meat or poultry, cut off any visible fat  · Use less fat and oil  Try baking foods instead of frying them  Add less fat, such as margarine, sour cream, regular salad dressing and mayonnaise to foods  Eat fewer high-fat foods  Some examples of high-fat foods include french fries, doughnuts, ice cream, and cakes  · Eat fewer sweets  Limit foods and drinks that are high in sugar  This includes candy, cookies, regular soda, and sweetened drinks  Exercise:  Exercise at least 30 minutes per day on most days of the week  Some examples of exercise include walking, biking, dancing, and swimming  You can also fit in more physical activity by taking the stairs instead of the elevator or parking farther away from stores  Ask your healthcare provider about the best exercise plan for you  © Copyright [employer] 2018 Information is for End User's use only and may not be sold, redistributed or otherwise used for commercial purposes   All illustrations and images included in CareNotes® are the copyrighted property of [employer]  or [address] [place] [address]

## 2022-07-19 NOTE — PROGRESS NOTES
Assessment and Plan:     Problem List Items Addressed This Visit    None     Visit Diagnoses     Medicare annual wellness visit, subsequent    -  Primary           Preventive health issues were discussed with patient, and age appropriate screening tests were ordered as noted in patient's After Visit Summary  Personalized health advice and appropriate referrals for health education or preventive services given if needed, as noted in patient's After Visit Summary       History of Present Illness:     Patient presents for a Medicare Wellness Visit    HPI   Patient Care Team:  Adela Steele as PCP - General (Family Medicine)  Harshal Barrios MD     Review of Systems:     Review of Systems     Problem List:     Patient Active Problem List   Diagnosis    Elevated alkaline phosphatase level    Esophageal reflux    Hearing loss    Hypothyroidism    Hyperlipidemia    Impaired fasting glucose    Morbid obesity ([address]    Obstructive sleep apnea    Palpitations    SVT (supraventricular tachycardia) ([address]    Essential hypertension    BMI 38 0-38 9,adult    Arthritis of left knee    Arthralgia    Nontraumatic complete tear of left rotator cuff    Postmenopausal    Morbid obesity with BMI of 40 0-44 9, adult ([address] )    Tinnitus of both ears    Bilateral pulmonary embolism ([address] )    Pneumonia due to COVID-19 virus    Hypercholesterolemia    Lymphadenopathy    Acquired hypothyroidism    Hypokalemia    Transaminitis    Hyponatremia    Low TSH level    Hyperchloremia    Hyperglycemia    Diastolic dysfunction    Vitamin D deficiency    Herpes zoster without complication    Rash and nonspecific skin eruption      Past Medical and Surgical History:     Past Medical History:   Diagnosis Date    Allergic     COVID-19     Hypertension     last assessed 5/4/15     Hypothyroid      Past Surgical History:   Procedure Laterality Date    DILATION AND CURETTAGE OF UTERUS      GALLBLADDER SURGERY Family History:     Family History   Problem Relation Age of Onset    Emphysema Mother     Emphysema Father       Social History:     Social History     Socioeconomic History    Marital status: /Civil Union     Spouse name: None    Number of children: None    Years of education: None    Highest education level: None   Occupational History    Occupation: retired     Occupation:     Tobacco Use    Smoking status: Never Smoker    Smokeless tobacco: Never Used   Vaping Use    Vaping Use: Never used   Substance and Sexual Activity    Alcohol use: Not Currently     Alcohol/week: 0 0 standard drinks    Drug use: No    Sexual activity: None   Other Topics Concern    None   Social History Narrative    Lives with friend / boyfriend      Social Determinants of Health     Financial Resource Strain: Not on file   Food Insecurity: Not on file   Transportation Needs: Not on file   Physical Activity: Not on file   Stress: Not on file   Social Connections: Not on file   Intimate Partner Violence: Not on file   Housing Stability: Not on file      Medications and Allergies:     Current Outpatient Medications   Medication Sig Dispense Refill    atorvastatin (LIPITOR) 20 mg tablet TAKE 1 TABLET BY MOUTH EVERY DAY 90 tablet 2    albuterol (PROVENTIL HFA,VENTOLIN HFA) 90 mcg/act inhaler Inhale 2 puffs every 4 (four) hours as needed for wheezing (Patient not taking: No sig reported) 1 Inhaler 0    Alcohol Swabs (B-D SINGLE USE SWABS REGULAR) PADS Use 2 (two) times a day DX: R73 03 300 each 3    amLODIPine (NORVASC) 2 5 mg tablet TAKE 1 TABLET DAILY 90 tablet 3    apixaban (ELIQUIS) 5 mg Take 1 tablet (5 mg) twice daily (Patient not taking: Reported on 6/14/2022) 180 tablet 3    Blood Glucose Calibration (True Metrix Level 2) Normal SOLN Test 3 (three) times a week Dx: R73 03 3 each 3    Blood Glucose Monitoring Suppl (True Metrix Air Glucose Meter) w/Device KIT Use 2 (two) times a day TEST 2X DAILY   DX: R73 03 1 kit 0    cetirizine (ZyrTEC) 10 mg tablet TAKE 1 TABLET BY MOUTH EVERY DAY 90 tablet 2    ferrous sulfate 324 (65 Fe) mg TAKE 1 TABLET BY MOUTH 2 TIMES A DAY BEFORE MEALS 180 tablet 1    glucose blood (True Metrix Blood Glucose Test) test strip TEST 2X DAILY    DX: R73 03 200 each 3    levothyroxine (Synthroid) 75 mcg tablet Take 1 tablet (75 mcg total) by mouth daily in the early morning 90 tablet 3    levothyroxine 100 mcg tablet TAKE 1 TABLET EVERY DAY 90 tablet 0    losartan (COZAAR) 25 mg tablet TAKE 1 TABLET DAILY 90 tablet 1    methylPREDNISolone 4 MG tablet therapy pack Use as directed on package 21 each 0    omeprazole (PriLOSEC) 20 mg delayed release capsule TAKE 1 CAPSULE EVERY DAY 90 capsule 3    TRUEplus Lancets 30G MISC Use 2 (two) times a day DX: R73 03 200 each 3     No current facility-administered medications for this visit       Allergies   Allergen Reactions    Honey Tussin [Dextromethorphan] Anaphylaxis    Nuts - Food Allergy Anaphylaxis     Almonds    Penicillins Itching    Shrimp Extract Allergy Skin Test - Food Allergy Anaphylaxis      Immunizations:     Immunization History   Administered Date(s) Administered    COVID-19 MODERNA VACC 0 25 ML IM BOOSTER 11/09/2021    COVID-19 MODERNA VACC 0 5 ML IM 04/02/2021, 05/04/2021    INFLUENZA 09/14/2017    Influenza, high dose seasonal 0 7 mL 10/01/2019, 12/04/2020    Influenza, seasonal, injectable 1945    Pneumococcal Conjugate 13-Valent 10/27/2015, 10/03/2017    Pneumococcal Polysaccharide PPV23 1945    Tdap 01/01/2010    Zoster 1945      Health Maintenance:         Topic Date Due    Hepatitis C Screening  Completed         Topic Date Due    Pneumococcal Vaccine: 65+ Years (2 - PPSV23 or PCV20) 10/03/2018    COVID-19 Vaccine (4 - Booster for Moderna series) 03/09/2022    Influenza Vaccine (1) 09/01/2022      Medicare Screening Tests and Risk Assessments:     Silverio Resendiz is here for her Subsequent Wellness visit  Health Risk Assessment:   Patient rates overall health as fair  Patient feels that their physical health rating is slightly better  Patient is satisfied with their life  Eyesight was rated as same  Hearing was rated as slightly worse  Patient feels that their emotional and mental health rating is slightly worse  Patients states they are never, rarely angry  Patient states they are always unusually tired/fatigued  Pain experienced in the last 7 days has been none  Patient states that she has experienced weight loss or gain in last 6 months  Depression Screening:   PHQ-2 Score: 1      Fall Risk Screening: In the past year, patient has experienced: no history of falling in past year      Urinary Incontinence Screening:   Patient has leaked urine accidently in the last six months  Coughing to much will cause it    Home Safety:  Patient has trouble with stairs inside or outside of their home  Patient has working smoke alarms and has working carbon monoxide detector  Home safety hazards include: none  Nutrition:   Current diet is Regular  Lowing bread intake and soda    Medications:   Patient is currently taking over-the-counter supplements  OTC medications include: see medication list  Patient is able to manage medications  Activities of Daily Living (ADLs)/Instrumental Activities of Daily Living (IADLs):   Walk and transfer into and out of bed and chair?: Yes  Dress and groom yourself?: Yes    Bathe or shower yourself?: Yes    Feed yourself? Yes  Do your laundry/housekeeping?: No  Manage your money, pay your bills and track your expenses?: Yes  Make your own meals?: No    Do your own shopping?: No    ADL comments:  helps with the things she can't do  She can't stand or walk for long  Previous Hospitalizations:   Any hospitalizations or ED visits within the last 12 months?: No      Advance Care Planning:   Living will: No    Durable POA for healthcare:  Yes Advanced directive: No    Advanced directive counseling given: No    Five wishes given: No    Patient declined ACP directive: No    End of Life Decisions reviewed with patient: No    Provider agrees with end of life decisions: Yes      Cognitive Screening:   Provider or family/friend/caregiver concerned regarding cognition?: No    PREVENTIVE SCREENINGS      Cardiovascular Screening:    General: History Lipid Disorder and Risks and Benefits Discussed    Due for: Lipid Panel, Cholesterol and Triglycerides      Diabetes Screening:     General: Screening Current and Risks and Benefits Discussed      Colorectal Cancer Screening:     General: Screening Not Indicated      Cervical Cancer Screening:    General: Screening Not Indicated      Osteoporosis Screening:    General: Screening Not Indicated      Abdominal Aortic Aneurysm (AAA) Screening:        General: Screening Not Indicated      Lung Cancer Screening:     General: Screening Not Indicated      Hepatitis C Screening:    General: Screening Current    Screening, Brief Intervention, and Referral to Treatment (SBIRT)    Screening  Typical number of drinks in a day: 0  Typical number of drinks in a week: 0  Interpretation: Low risk drinking behavior      Single Item Drug Screening:  How often have you used an illegal drug (including marijuana) or a prescription medication for non-medical reasons in the past year? never    Single Item Drug Screen Score: 0  Interpretation: Negative screen for possible drug use disorder    No exam data present     Physical Exam:     /70 (BP Location: Right arm, Patient Position: Sitting, Cuff Size: Large)   Pulse 105   Temp 97 9 °F (36 6 °C)   Wt 115 kg (254 lb 6 4 oz)   SpO2 98%   BMI 42 33 kg/m²     Physical Exam     DANIELLA Bills

## 2022-08-17 ENCOUNTER — APPOINTMENT (OUTPATIENT)
Dept: LAB | Facility: HOSPITAL | Age: 77
End: 2022-08-17
Payer: COMMERCIAL

## 2022-08-17 ENCOUNTER — OFFICE VISIT (OUTPATIENT)
Dept: INTERNAL MEDICINE CLINIC | Facility: CLINIC | Age: 77
End: 2022-08-17
Payer: COMMERCIAL

## 2022-08-17 VITALS
WEIGHT: 257.3 LBS | SYSTOLIC BLOOD PRESSURE: 136 MMHG | BODY MASS INDEX: 42.87 KG/M2 | OXYGEN SATURATION: 95 % | TEMPERATURE: 97.8 F | HEIGHT: 65 IN | DIASTOLIC BLOOD PRESSURE: 72 MMHG | HEART RATE: 98 BPM

## 2022-08-17 DIAGNOSIS — M79.89 LEG SWELLING: ICD-10-CM

## 2022-08-17 DIAGNOSIS — E03.9 HYPOTHYROIDISM, UNSPECIFIED TYPE: ICD-10-CM

## 2022-08-17 DIAGNOSIS — M79.89 LEG SWELLING: Primary | ICD-10-CM

## 2022-08-17 LAB
ALBUMIN SERPL BCP-MCNC: 3.4 G/DL (ref 3.5–5)
ALP SERPL-CCNC: 119 U/L (ref 46–116)
ALT SERPL W P-5'-P-CCNC: 29 U/L (ref 12–78)
ANION GAP SERPL CALCULATED.3IONS-SCNC: 10 MMOL/L (ref 4–13)
AST SERPL W P-5'-P-CCNC: 26 U/L (ref 5–45)
BASOPHILS # BLD AUTO: 0.05 THOUSANDS/ΜL (ref 0–0.1)
BASOPHILS NFR BLD AUTO: 1 % (ref 0–1)
BILIRUB SERPL-MCNC: 0.56 MG/DL (ref 0.2–1)
BUN SERPL-MCNC: 12 MG/DL (ref 5–25)
CALCIUM ALBUM COR SERPL-MCNC: 9.1 MG/DL (ref 8.3–10.1)
CALCIUM SERPL-MCNC: 8.6 MG/DL (ref 8.3–10.1)
CHLORIDE SERPL-SCNC: 104 MMOL/L (ref 96–108)
CO2 SERPL-SCNC: 26 MMOL/L (ref 21–32)
CREAT SERPL-MCNC: 0.96 MG/DL (ref 0.6–1.3)
EOSINOPHIL # BLD AUTO: 0.21 THOUSAND/ΜL (ref 0–0.61)
EOSINOPHIL NFR BLD AUTO: 3 % (ref 0–6)
ERYTHROCYTE [DISTWIDTH] IN BLOOD BY AUTOMATED COUNT: 13.5 % (ref 11.6–15.1)
GFR SERPL CREATININE-BSD FRML MDRD: 57 ML/MIN/1.73SQ M
GLUCOSE SERPL-MCNC: 151 MG/DL (ref 65–140)
HCT VFR BLD AUTO: 39.7 % (ref 34.8–46.1)
HGB BLD-MCNC: 13.2 G/DL (ref 11.5–15.4)
IMM GRANULOCYTES # BLD AUTO: 0.01 THOUSAND/UL (ref 0–0.2)
IMM GRANULOCYTES NFR BLD AUTO: 0 % (ref 0–2)
LYMPHOCYTES # BLD AUTO: 1.92 THOUSANDS/ΜL (ref 0.6–4.47)
LYMPHOCYTES NFR BLD AUTO: 31 % (ref 14–44)
MCH RBC QN AUTO: 30 PG (ref 26.8–34.3)
MCHC RBC AUTO-ENTMCNC: 33.2 G/DL (ref 31.4–37.4)
MCV RBC AUTO: 90 FL (ref 82–98)
MONOCYTES # BLD AUTO: 0.51 THOUSAND/ΜL (ref 0.17–1.22)
MONOCYTES NFR BLD AUTO: 8 % (ref 4–12)
NEUTROPHILS # BLD AUTO: 3.47 THOUSANDS/ΜL (ref 1.85–7.62)
NEUTS SEG NFR BLD AUTO: 57 % (ref 43–75)
NRBC BLD AUTO-RTO: 0 /100 WBCS
NT-PROBNP SERPL-MCNC: 365 PG/ML
PLATELET # BLD AUTO: 286 THOUSANDS/UL (ref 149–390)
PMV BLD AUTO: 10.5 FL (ref 8.9–12.7)
POTASSIUM SERPL-SCNC: 3.2 MMOL/L (ref 3.5–5.3)
PROT SERPL-MCNC: 6.8 G/DL (ref 6.4–8.4)
RBC # BLD AUTO: 4.4 MILLION/UL (ref 3.81–5.12)
SODIUM SERPL-SCNC: 140 MMOL/L (ref 135–147)
T4 FREE SERPL-MCNC: 0.94 NG/DL (ref 0.76–1.46)
TSH SERPL DL<=0.05 MIU/L-ACNC: 0.34 UIU/ML (ref 0.45–4.5)
WBC # BLD AUTO: 6.17 THOUSAND/UL (ref 4.31–10.16)

## 2022-08-17 PROCEDURE — 1160F RVW MEDS BY RX/DR IN RCRD: CPT | Performed by: NURSE PRACTITIONER

## 2022-08-17 PROCEDURE — 80053 COMPREHEN METABOLIC PANEL: CPT

## 2022-08-17 PROCEDURE — 83880 ASSAY OF NATRIURETIC PEPTIDE: CPT

## 2022-08-17 PROCEDURE — 84439 ASSAY OF FREE THYROXINE: CPT

## 2022-08-17 PROCEDURE — 84443 ASSAY THYROID STIM HORMONE: CPT

## 2022-08-17 PROCEDURE — 85025 COMPLETE CBC W/AUTO DIFF WBC: CPT

## 2022-08-17 PROCEDURE — 99214 OFFICE O/P EST MOD 30 MIN: CPT | Performed by: NURSE PRACTITIONER

## 2022-08-17 PROCEDURE — 36415 COLL VENOUS BLD VENIPUNCTURE: CPT

## 2022-08-17 NOTE — PROGRESS NOTES
Assessment/Plan: Will order labs on patient  No concerns for blood clot  Did advise to keep legs elevated  On low dose Norvasc but do not feel causing the leg swelling at present  Will follow up in 3 months or sooner if need be  No problem-specific Assessment & Plan notes found for this encounter  Problem List Items Addressed This Visit        Endocrine    Hypothyroidism    Relevant Orders    TSH, 3rd generation with Free T4 reflex       Other    Leg swelling - Primary    Relevant Orders    Comprehensive metabolic panel    CBC and differential    NT-BNP PRO            Subjective:      Patient ID: Dominguez Quintana is a 67 y o  female  Dominguez Jordan is for an acute visit  She states she did a lot of walking over the weekend and started with leg swelling  She states she is not having any pain but is having numbness and tingling in the foot but that did go away  She denies any chest pain or palpitations  She is having some SOB which is normal  She states she limits salt intake  She denies any redness or warmth to the leg  She offers no other issues  The following portions of the patient's history were reviewed and updated as appropriate: She  has a past medical history of Allergic, COVID-19, Hypertension, and Hypothyroid    She   Patient Active Problem List    Diagnosis Date Noted    Leg swelling 08/17/2022    Rash and nonspecific skin eruption 06/14/2022    Herpes zoster without complication 14/76/6097    Vitamin D deficiency 82/91/7790    Diastolic dysfunction 99/66/2979    Hyperchloremia 01/02/2021    Hyperglycemia 01/02/2021    Hyponatremia 01/01/2021    Low TSH level 01/01/2021    Bilateral pulmonary embolism ([facility] ) 12/31/2020    Pneumonia due to COVID-19 virus 12/31/2020    Hypercholesterolemia 12/31/2020    Lymphadenopathy 12/31/2020    Acquired hypothyroidism 12/31/2020    Hypokalemia 12/31/2020    Transaminitis 12/31/2020    Morbid obesity with BMI of 40 0-44 9, adult ([facility] [address] ) 10/07/2020  Tinnitus of both ears 10/07/2020    BMI 38 0-38 9,adult 10/01/2019    Arthritis of left knee 10/01/2019    Arthralgia 10/01/2019    Nontraumatic complete tear of left rotator cuff 10/01/2019    Postmenopausal 10/01/2019    Essential hypertension 07/31/2018    Obstructive sleep apnea 05/04/2015    SVT (supraventricular tachycardia) (HCC) 04/02/2015    Elevated alkaline phosphatase level 02/26/2015    Impaired fasting glucose 02/26/2015    Palpitations 02/26/2015    Hearing loss 03/24/2014    Hyperlipidemia 03/24/2014    Esophageal reflux 03/21/2014    Hypothyroidism 03/21/2014    Morbid obesity ([address] ) 03/21/2014     She  has a past surgical history that includes Dilation and curettage of uterus and Gallbladder surgery  Her family history includes Emphysema in her father and mother  She  reports that she has never smoked  She has never used smokeless tobacco  She reports previous alcohol use  She reports that she does not use drugs    Current Outpatient Medications   Medication Sig Dispense Refill    Alcohol Swabs (B-D SINGLE USE SWABS REGULAR) PADS Use 2 (two) times a day DX: R73 03 300 each 3    amLODIPine (NORVASC) 2 5 mg tablet TAKE 1 TABLET DAILY 90 tablet 3    atorvastatin (LIPITOR) 20 mg tablet TAKE 1 TABLET BY MOUTH EVERY DAY 90 tablet 2    Blood Glucose Calibration (True Metrix Level 2) Normal SOLN Test 3 (three) times a week Dx: R73 03 3 each 3    Blood Glucose Monitoring Suppl (True Metrix Air Glucose Meter) w/Device KIT Use 2 (two) times a day TEST 2X DAILY   DX: R73 03 1 kit 0    cetirizine (ZyrTEC) 10 mg tablet TAKE 1 TABLET BY MOUTH EVERY DAY 90 tablet 2    glucose blood (True Metrix Blood Glucose Test) test strip TEST 2X DAILY    DX: R73 03 200 each 3    levothyroxine (Synthroid) 75 mcg tablet Take 1 tablet (75 mcg total) by mouth daily in the early morning 90 tablet 3    losartan (COZAAR) 25 mg tablet TAKE 1 TABLET DAILY 90 tablet 1    omeprazole (PriLOSEC) 20 mg delayed release capsule TAKE 1 CAPSULE EVERY DAY 90 capsule 3    TRUEplus Lancets 30G MISC Use 2 (two) times a day DX: R73 03 200 each 3    albuterol (PROVENTIL HFA,VENTOLIN HFA) 90 mcg/act inhaler Inhale 2 puffs every 4 (four) hours as needed for wheezing (Patient not taking: No sig reported) 1 Inhaler 0    apixaban (ELIQUIS) 5 mg Take 1 tablet (5 mg) twice daily (Patient not taking: No sig reported) 180 tablet 3    ferrous sulfate 324 (65 Fe) mg TAKE 1 TABLET BY MOUTH 2 TIMES A DAY BEFORE MEALS 180 tablet 1    levothyroxine 100 mcg tablet TAKE 1 TABLET EVERY DAY (Patient not taking: Reported on 8/17/2022) 90 tablet 0     No current facility-administered medications for this visit       Current Outpatient Medications on File Prior to Visit   Medication Sig    Alcohol Swabs (B-D SINGLE USE SWABS REGULAR) PADS Use 2 (two) times a day DX: R73 03    amLODIPine (NORVASC) 2 5 mg tablet TAKE 1 TABLET DAILY    atorvastatin (LIPITOR) 20 mg tablet TAKE 1 TABLET BY MOUTH EVERY DAY    Blood Glucose Calibration (True Metrix Level 2) Normal SOLN Test 3 (three) times a week Dx: R73 03    Blood Glucose Monitoring Suppl (True Metrix Air Glucose Meter) w/Device KIT Use 2 (two) times a day TEST 2X DAILY   DX: R73 03    cetirizine (ZyrTEC) 10 mg tablet TAKE 1 TABLET BY MOUTH EVERY DAY    glucose blood (True Metrix Blood Glucose Test) test strip TEST 2X DAILY    DX: R73 03    levothyroxine (Synthroid) 75 mcg tablet Take 1 tablet (75 mcg total) by mouth daily in the early morning    losartan (COZAAR) 25 mg tablet TAKE 1 TABLET DAILY    omeprazole (PriLOSEC) 20 mg delayed release capsule TAKE 1 CAPSULE EVERY DAY    TRUEplus Lancets 30G MISC Use 2 (two) times a day DX: R73 03    albuterol (PROVENTIL HFA,VENTOLIN HFA) 90 mcg/act inhaler Inhale 2 puffs every 4 (four) hours as needed for wheezing (Patient not taking: No sig reported)    apixaban (ELIQUIS) 5 mg Take 1 tablet (5 mg) twice daily (Patient not taking: No sig reported)  ferrous sulfate 324 (65 Fe) mg TAKE 1 TABLET BY MOUTH 2 TIMES A DAY BEFORE MEALS    levothyroxine 100 mcg tablet TAKE 1 TABLET EVERY DAY (Patient not taking: Reported on 8/17/2022)    [DISCONTINUED] methylPREDNISolone 4 MG tablet therapy pack Use as directed on package (Patient not taking: Reported on 8/17/2022)     No current facility-administered medications on file prior to visit  She is allergic to honey tussin [dextromethorphan], nuts - food allergy, penicillins, and shrimp extract allergy skin test - food allergy       Review of Systems   Cardiovascular: Positive for leg swelling  All other systems reviewed and are negative  Objective:      /72 (BP Location: Left arm, Patient Position: Sitting, Cuff Size: Large)   Pulse 98   Temp 97 8 °F (36 6 °C)   Ht 5' 5" (1 651 m)   Wt 117 kg (257 lb 4 8 oz)   SpO2 95%   BMI 42 82 kg/m²          Physical Exam  Vitals reviewed  Constitutional:       Appearance: Normal appearance  She is obese  Cardiovascular:      Rate and Rhythm: Normal rate and regular rhythm  Pulses: Normal pulses  Heart sounds: Normal heart sounds  Pulmonary:      Effort: Pulmonary effort is normal       Breath sounds: Normal breath sounds  Musculoskeletal:         General: Swelling present  Normal range of motion  Skin:     General: Skin is warm and dry  Capillary Refill: Capillary refill takes less than 2 seconds  Neurological:      General: No focal deficit present  Mental Status: She is alert and oriented to person, place, and time  Mental status is at baseline  Psychiatric:         Mood and Affect: Mood normal          Behavior: Behavior normal          Thought Content:  Thought content normal          Judgment: Judgment normal

## 2022-08-23 ENCOUNTER — TELEPHONE (OUTPATIENT)
Dept: INTERNAL MEDICINE CLINIC | Facility: CLINIC | Age: 77
End: 2022-08-23

## 2022-08-23 DIAGNOSIS — E03.9 HYPOTHYROIDISM, UNSPECIFIED TYPE: Primary | ICD-10-CM

## 2022-08-23 RX ORDER — LEVOTHYROXINE SODIUM 0.05 MG/1
50 TABLET ORAL DAILY
Qty: 30 TABLET | Refills: 3 | Status: SHIPPED | OUTPATIENT
Start: 2022-08-23 | End: 2022-09-14

## 2022-08-23 NOTE — TELEPHONE ENCOUNTER
Patient called for lab results yesterday and I informed her of the results  I told her that Jeannie Jessy called her on 8/18 but that her mailbox was full  Told her that her potassium is low increase her intake of foods high in potassium  Also that her TSH is very low and that Jono Rivero will have to adjust her dose of levothyroxine  She is now taking 75 mcg levothyroxine  Today patient's  General Meza called asking the same information  I did tell him that I spoke to wife yesterday and that the mailbox is full

## 2022-09-14 DIAGNOSIS — E03.9 HYPOTHYROIDISM, UNSPECIFIED TYPE: ICD-10-CM

## 2022-09-14 RX ORDER — LEVOTHYROXINE SODIUM 0.05 MG/1
TABLET ORAL
Qty: 90 TABLET | Refills: 2 | Status: SHIPPED | OUTPATIENT
Start: 2022-09-14

## 2022-10-12 PROBLEM — U07.1 PNEUMONIA DUE TO COVID-19 VIRUS: Status: RESOLVED | Noted: 2020-12-31 | Resolved: 2022-10-12

## 2022-10-12 PROBLEM — J12.82 PNEUMONIA DUE TO COVID-19 VIRUS: Status: RESOLVED | Noted: 2020-12-31 | Resolved: 2022-10-12

## 2022-11-16 DIAGNOSIS — K21.9 GASTROESOPHAGEAL REFLUX DISEASE WITHOUT ESOPHAGITIS: ICD-10-CM

## 2022-11-16 RX ORDER — OMEPRAZOLE 20 MG/1
CAPSULE, DELAYED RELEASE ORAL
Qty: 90 CAPSULE | Refills: 3 | Status: SHIPPED | OUTPATIENT
Start: 2022-11-16

## 2022-11-28 DIAGNOSIS — I10 ESSENTIAL HYPERTENSION: ICD-10-CM

## 2022-11-28 RX ORDER — AMLODIPINE BESYLATE 2.5 MG/1
TABLET ORAL
Qty: 90 TABLET | Refills: 3 | Status: SHIPPED | OUTPATIENT
Start: 2022-11-28

## 2023-01-22 NOTE — PROGRESS NOTES
PTT >210, Heparin drip put on hold @ 0745, will restart @ 0845 and follow protocol  Next PTT due at 1345  PAST SURGICAL HISTORY:  No significant past surgical history

## 2023-04-05 DIAGNOSIS — E78.2 MIXED HYPERLIPIDEMIA: ICD-10-CM

## 2023-04-05 RX ORDER — ATORVASTATIN CALCIUM 20 MG/1
TABLET, FILM COATED ORAL
Qty: 90 TABLET | Refills: 2 | Status: SHIPPED | OUTPATIENT
Start: 2023-04-05

## 2023-06-13 ENCOUNTER — VBI (OUTPATIENT)
Dept: ADMINISTRATIVE | Facility: OTHER | Age: 78
End: 2023-06-13